# Patient Record
Sex: MALE | Race: WHITE
[De-identification: names, ages, dates, MRNs, and addresses within clinical notes are randomized per-mention and may not be internally consistent; named-entity substitution may affect disease eponyms.]

---

## 2018-06-26 ENCOUNTER — HOSPITAL ENCOUNTER (OUTPATIENT)
Dept: HOSPITAL 53 - M RAD | Age: 58
End: 2018-06-26
Attending: NURSE PRACTITIONER
Payer: COMMERCIAL

## 2018-06-26 DIAGNOSIS — N50.819: Primary | ICD-10-CM

## 2018-06-26 PROCEDURE — 76870 US EXAM SCROTUM: CPT

## 2018-12-21 ENCOUNTER — HOSPITAL ENCOUNTER (EMERGENCY)
Dept: HOSPITAL 53 - M ED | Age: 58
LOS: 1 days | Discharge: HOME | End: 2018-12-22
Payer: COMMERCIAL

## 2018-12-21 VITALS — WEIGHT: 165.35 LBS | BODY MASS INDEX: 21.22 KG/M2 | HEIGHT: 74 IN

## 2018-12-21 VITALS — DIASTOLIC BLOOD PRESSURE: 86 MMHG | SYSTOLIC BLOOD PRESSURE: 148 MMHG

## 2018-12-21 DIAGNOSIS — Z79.899: ICD-10-CM

## 2018-12-21 DIAGNOSIS — E10.40: ICD-10-CM

## 2018-12-21 DIAGNOSIS — Z79.82: ICD-10-CM

## 2018-12-21 DIAGNOSIS — K31.84: ICD-10-CM

## 2018-12-21 DIAGNOSIS — E78.5: ICD-10-CM

## 2018-12-21 DIAGNOSIS — Z88.8: ICD-10-CM

## 2018-12-21 DIAGNOSIS — E10.65: ICD-10-CM

## 2018-12-21 DIAGNOSIS — Z72.0: ICD-10-CM

## 2018-12-21 DIAGNOSIS — E86.0: ICD-10-CM

## 2018-12-21 DIAGNOSIS — I10: ICD-10-CM

## 2018-12-21 DIAGNOSIS — Z79.4: ICD-10-CM

## 2018-12-21 DIAGNOSIS — M54.9: ICD-10-CM

## 2018-12-21 DIAGNOSIS — G89.29: ICD-10-CM

## 2018-12-21 DIAGNOSIS — I95.1: Primary | ICD-10-CM

## 2018-12-21 LAB
BASE EXCESS BLDV CALC-SCNC: -2.9 MMOL/L (ref -2–2)
BASOPHILS # BLD AUTO: 0.1 10^3/UL (ref 0–0.2)
BASOPHILS NFR BLD AUTO: 0.8 % (ref 0–1)
BUN SERPL-MCNC: 12 MG/DL (ref 7–18)
CALCIUM SERPL-MCNC: 8.8 MG/DL (ref 8.5–10.1)
CHLORIDE SERPL-SCNC: 100 MEQ/L (ref 98–107)
CK MB CFR.DF SERPL CALC: 2.13
CK MB SERPL-MCNC: < 1 NG/ML (ref ?–3.6)
CK SERPL-CCNC: 47 U/L (ref 39–308)
CO2 BLDV CALC-SCNC: 24.6 MEQ/L (ref 24–28)
CO2 SERPL-SCNC: 27 MEQ/L (ref 21–32)
CREAT SERPL-MCNC: 0.86 MG/DL (ref 0.7–1.3)
EOSINOPHIL # BLD AUTO: 0.2 10^3/UL (ref 0–0.5)
EOSINOPHIL NFR BLD AUTO: 2.6 % (ref 0–3)
EST. AVERAGE GLUCOSE BLD GHB EST-MCNC: 309 MG/DL (ref 60–110)
ETHANOL SERPL-MCNC: < 0.003 % (ref 0–0.01)
GFR SERPL CREATININE-BSD FRML MDRD: > 60 ML/MIN/{1.73_M2} (ref 56–?)
GLUCOSE SERPL-MCNC: 362 MG/DL (ref 70–100)
HCO3 BLDV-SCNC: 23.2 MEQ/L (ref 23–27)
HCO3 STD BLDV-SCNC: 20.6 MEQ/L
HCT VFR BLD AUTO: 43.7 % (ref 42–52)
HGB BLD-MCNC: 14.8 G/DL (ref 13.5–17.5)
LYMPHOCYTES # BLD AUTO: 1.9 10^3/UL (ref 1.5–4.5)
LYMPHOCYTES NFR BLD AUTO: 30 % (ref 24–44)
MCH RBC QN AUTO: 30 PG (ref 27–33)
MCHC RBC AUTO-ENTMCNC: 33.9 G/DL (ref 32–36.5)
MCV RBC AUTO: 88.6 FL (ref 80–96)
MONOCYTES # BLD AUTO: 0.4 10^3/UL (ref 0–0.8)
MONOCYTES NFR BLD AUTO: 6.6 % (ref 0–5)
NEUTROPHILS # BLD AUTO: 3.7 10^3/UL (ref 1.8–7.7)
NEUTROPHILS NFR BLD AUTO: 59.7 % (ref 36–66)
PCO2 BLDV: 45.4 MMHG (ref 38–50)
PH BLDV: 7.33 UNITS (ref 7.33–7.43)
PLATELET # BLD AUTO: 288 10^3/UL (ref 150–450)
PO2 BLDV: 26.6 MMHG (ref 30–50)
POTASSIUM SERPL-SCNC: 4.5 MEQ/L (ref 3.5–5.1)
RBC # BLD AUTO: 4.93 10^6/UL (ref 4.3–6.1)
SAO2 % BLDV: 41.6 % (ref 60–80)
SODIUM SERPL-SCNC: 135 MEQ/L (ref 136–145)
TROPONIN I SERPL-MCNC: < 0.02 NG/ML (ref ?–0.1)
WBC # BLD AUTO: 6.2 10^3/UL (ref 4–10)

## 2018-12-21 PROCEDURE — 82553 CREATINE MB FRACTION: CPT

## 2018-12-21 PROCEDURE — 82550 ASSAY OF CK (CPK): CPT

## 2018-12-21 PROCEDURE — 96375 TX/PRO/DX INJ NEW DRUG ADDON: CPT

## 2018-12-21 PROCEDURE — 80320 DRUG SCREEN QUANTALCOHOLS: CPT

## 2018-12-21 PROCEDURE — 96361 HYDRATE IV INFUSION ADD-ON: CPT

## 2018-12-21 PROCEDURE — 85025 COMPLETE CBC W/AUTO DIFF WBC: CPT

## 2018-12-21 PROCEDURE — 99284 EMERGENCY DEPT VISIT MOD MDM: CPT

## 2018-12-21 PROCEDURE — 81001 URINALYSIS AUTO W/SCOPE: CPT

## 2018-12-21 PROCEDURE — 80048 BASIC METABOLIC PNL TOTAL CA: CPT

## 2018-12-21 PROCEDURE — 82803 BLOOD GASES ANY COMBINATION: CPT

## 2018-12-21 PROCEDURE — 96374 THER/PROPH/DIAG INJ IV PUSH: CPT

## 2018-12-21 PROCEDURE — 83036 HEMOGLOBIN GLYCOSYLATED A1C: CPT

## 2018-12-21 PROCEDURE — 70450 CT HEAD/BRAIN W/O DYE: CPT

## 2018-12-21 PROCEDURE — 93005 ELECTROCARDIOGRAM TRACING: CPT

## 2018-12-21 PROCEDURE — 71046 X-RAY EXAM CHEST 2 VIEWS: CPT

## 2018-12-21 NOTE — REP
Clinical:  Orbital headache and dizziness .

 

Comparison: None .

 

Findings:

The ventricles, sulci, and cisterns are normal in position and appearance.

Gray-white differentiation is maintained.  No acute intracranial hemorrhage,

mass/mass effect, pathology or trauma/injury.  No evidence for acute infarction.

No extra-axial fluid collection.  Calvarium is intact.  Mild mucoperiosteal

changes to the ethmoid sinuses.

 

Impression:

No evidence for acute intracranial pathology or trauma/injury.

 

 

Electronically Signed by

Wade Angel MD 12/21/2018 07:38 P

## 2018-12-21 NOTE — REP
Clinical:  Back pain .

 

Comparison: 08/04/2015 .

 

Technique:  PA and lateral.

 

Findings:

The mediastinum and cardiac silhouette are normal.  The lung fields are clear and

without acute consolidation, effusion, or pneumothorax.  The skeletal structures

are intact and normal.

 

Impression:

1.   No acute cardiopulmonary process.

 

 

Electronically Signed by

Wade Angel MD 12/21/2018 08:03 P

## 2018-12-22 NOTE — ECGEPIP
Stationary ECG Study

                           [facility] - ED

                                       

                                       Test Date:    2018

Pat Name:     FRANCISCO BLANCA             Department:   

Patient ID:   [id number]                 Room:         -

Gender:       M                        Technician:   [id number]

:          1960               Requested By: NAIF WAYNE PA-C

Order Number: [id number]     Reading MD:   Maja Lundborg-Gray

                                 Measurements

Intervals                              Axis          

Rate:         67                       P:            67

DC:           160                      QRS:          57

QRSD:         86                       T:            61

QT:           384                                    

QTc:          405                                    

                           Interpretive Statements

SINUS RHYTHM

NONSPECIFIC ST T WAVE CHANGES

 

CW 5/20/15 RATE DECREASED

NONSPECIFIC ST T WAVE CHANGES

Electronically Signed On 2018 20:01:24 EST by Maja Lundborg-Gray

## 2019-10-06 ENCOUNTER — HOSPITAL ENCOUNTER (INPATIENT)
Dept: HOSPITAL 53 - M ED | Age: 59
LOS: 2 days | Discharge: HOME | DRG: 420 | End: 2019-10-08
Attending: INTERNAL MEDICINE | Admitting: INTERNAL MEDICINE
Payer: COMMERCIAL

## 2019-10-06 VITALS — HEIGHT: 74 IN | WEIGHT: 165.35 LBS | BODY MASS INDEX: 21.22 KG/M2

## 2019-10-06 DIAGNOSIS — Z79.82: ICD-10-CM

## 2019-10-06 DIAGNOSIS — F17.200: ICD-10-CM

## 2019-10-06 DIAGNOSIS — Z79.899: ICD-10-CM

## 2019-10-06 DIAGNOSIS — E10.10: Primary | ICD-10-CM

## 2019-10-06 DIAGNOSIS — E87.1: ICD-10-CM

## 2019-10-06 DIAGNOSIS — E10.43: ICD-10-CM

## 2019-10-06 DIAGNOSIS — Z79.4: ICD-10-CM

## 2019-10-06 DIAGNOSIS — Z88.8: ICD-10-CM

## 2019-10-06 DIAGNOSIS — M54.5: ICD-10-CM

## 2019-10-06 DIAGNOSIS — E78.00: ICD-10-CM

## 2019-10-06 DIAGNOSIS — E83.39: ICD-10-CM

## 2019-10-06 DIAGNOSIS — R07.9: ICD-10-CM

## 2019-10-06 LAB
ALBUMIN SERPL BCG-MCNC: 4.5 GM/DL (ref 3.2–5.2)
ALT SERPL W P-5'-P-CCNC: 20 U/L (ref 12–78)
B-OH-BUTYR SERPL-MCNC: > 46 MG/DL (ref ?–2.81)
BASE EXCESS BLDV CALC-SCNC: -14.7 MMOL/L (ref -2–2)
BASE EXCESS BLDV CALC-SCNC: -16 MMOL/L (ref -2–2)
BASOPHILS # BLD AUTO: 0 10^3/UL (ref 0–0.2)
BASOPHILS NFR BLD AUTO: 0.5 % (ref 0–1)
BDY SITE: (no result)
BILIRUB SERPL-MCNC: 0.6 MG/DL (ref 0.2–1)
BUN SERPL-MCNC: 18 MG/DL (ref 7–18)
CALCIUM SERPL-MCNC: 9.4 MG/DL (ref 8.5–10.1)
CHLORIDE SERPL-SCNC: 101 MEQ/L (ref 98–107)
CK MB CFR.DF SERPL CALC: 2.22
CK MB SERPL-MCNC: < 1 NG/ML (ref ?–3.6)
CK SERPL-CCNC: 45 U/L (ref 39–308)
CO2 BLDV CALC-SCNC: 14.4 MEQ/L (ref 24–28)
CO2 BLDV CALC-SCNC: 15.2 MEQ/L (ref 24–28)
CO2 SERPL-SCNC: 16 MEQ/L (ref 21–32)
CREAT SERPL-MCNC: 1.21 MG/DL (ref 0.7–1.3)
EOSINOPHIL # BLD AUTO: 0.1 10^3/UL (ref 0–0.5)
EOSINOPHIL NFR BLD AUTO: 0.8 % (ref 0–3)
EST. AVERAGE GLUCOSE BLD GHB EST-MCNC: 312 MG/DL (ref 60–110)
GFR SERPL CREATININE-BSD FRML MDRD: > 60 ML/MIN/{1.73_M2} (ref 56–?)
GLUCOSE SERPL-MCNC: 318 MG/DL (ref 70–100)
HCO3 BLDV-SCNC: 13.1 MEQ/L (ref 23–27)
HCO3 BLDV-SCNC: 13.9 MEQ/L (ref 23–27)
HCO3 STD BLDV-SCNC: 12.1 MEQ/L
HCO3 STD BLDV-SCNC: 12.8 MEQ/L
HCT VFR BLD AUTO: 48.4 % (ref 42–52)
HGB BLD-MCNC: 16 G/DL (ref 13.5–17.5)
LIPASE SERPL-CCNC: 52 U/L (ref 73–393)
LYMPHOCYTES # BLD AUTO: 2.1 10^3/UL (ref 1.5–5)
LYMPHOCYTES NFR BLD AUTO: 26.6 % (ref 24–44)
MCH RBC QN AUTO: 30.2 PG (ref 27–33)
MCHC RBC AUTO-ENTMCNC: 33.1 G/DL (ref 32–36.5)
MCV RBC AUTO: 91.5 FL (ref 80–96)
MONOCYTES # BLD AUTO: 0.6 10^3/UL (ref 0–0.8)
MONOCYTES NFR BLD AUTO: 7.3 % (ref 0–5)
NEUTROPHILS # BLD AUTO: 5 10^3/UL (ref 1.5–8.5)
NEUTROPHILS NFR BLD AUTO: 64.4 % (ref 36–66)
PCO2 BLDV: 41.9 MMHG (ref 38–50)
PCO2 BLDV: 42 MMHG (ref 38–50)
PH BLDV: 7.11 UNITS (ref 7.33–7.43)
PH BLDV: 7.14 UNITS (ref 7.33–7.43)
PLATELET # BLD AUTO: 393 10^3/UL (ref 150–450)
PO2 BLDV: 30.8 MMHG (ref 30–50)
PO2 BLDV: 37 MMHG (ref 30–50)
POTASSIUM SERPL-SCNC: 4.9 MEQ/L (ref 3.5–5.1)
PROT SERPL-MCNC: 7.5 GM/DL (ref 6.4–8.2)
RBC # BLD AUTO: 5.29 10^6/UL (ref 4.3–6.1)
SAO2 % BLDV: 55.2 % (ref 60–80)
SAO2 % BLDV: 61.7 % (ref 60–80)
SODIUM SERPL-SCNC: 132 MEQ/L (ref 136–145)
TROPONIN I SERPL-MCNC: < 0.02 NG/ML (ref ?–0.1)
WBC # BLD AUTO: 7.8 10^3/UL (ref 4–10)

## 2019-10-06 NOTE — ECGEPIP
[facility] - ED

                                       

                                       Test Date:    2019-10-06

Pat Name:     FRANCISCO BLANCA             Department:   

Patient ID:   [id number]                 Room:         -

Gender:       Male                     Technician:   [id number]

:          1960               Requested By: EMILIA RICH

Order Number: [id number]     Reading MD:   Margarita Goddard

                                 Measurements

Intervals                              Axis          

Rate:         79                       P:            70

UT:           157                      QRS:          66

QRSD:         79                       T:            82

QT:           359                                    

QTc:          412                                    

                           Interpretive Statements

SINUS RHYTHM

NONSPECIFIC T-WAVE ABNORMALITY

INCREASED RATE 18

Electronically Signed on 10-6-2019 20:36:23 EDT by Margarita Goddard

## 2019-10-06 NOTE — HPEPDOC
[facility] Medical History & Physical


Date of Admission


Oct 6, 2019


Date of Service:  Oct 6, 2019


Primary Care Physician:  Nick Garcia


Attending Physician:  ZURDO MASSEY MD





History and Physical


TIME OF SERVICE: 1035PM CC time 60 min


   


CHIEF COMPLAINT:  not feeling well





HISTORY OF PRESENT ILLNESS: 


This is a 59-year-old male who presents with complaints of not feeling well, th

at began earlier on today. Specifically, he has a runny nose, chest pain, back 

pain, headache, abdominal pain with nonbloody emesis, chills increased thirst, 

and increased urination. He denies having a fever, denies having diarrhea, 

denies having constipation, denies missing any of his doses of insulin or having

any sick contacts. He has been admitted for DKA about 4 or 5 times in the past.


Per discussion with the ED attending his labs appear to be consistent with DKA. 

History of insulin drip and IV fluids.





REVIEW OF SYSTEMS: 12 point review of systems negative except as listed in HPI





PAST MEDICAL/ SURGICAL HISTORY:


Poorly controlled IDDM1 complicated by neuropathy and gastroparesis


Dyslipidemia.


Insomnia


Chronic back pain status post laminectomy.


Status post bilateral hernia repair





SOCIAL HISTORY:


Smoker





FAMILY HISTORY:


Leukemia.


Bladder cancer


   


ALLERGIES: Please see below.





HOME MEDICATIONS: Please see below. 





PHYSICAL EXAMINATION:


VITAL SIGNS:  Please see below.


GENERAL APPEARANCE: Slim build, well-developed, not in apparent distress


HEENT: Normocephalic, atraumatic, mucous membranes dry


CARDIOVASCULAR: Regular rate and rhythm. No murmurs, rubs or gallops


LUNGS: Clear to auscultation bilaterally on room air


ABDOMEN: Bowel sounds hypoactive. The abdomen is soft and nontender on palpation


MUSCULOSKELETAL: Range of motion is intact in all 4 extremities is no lower 

extremity edema


NEUROLOGICAL: Cranial nerves II-12 grossly intact. Speech is not dysarthric


PSYCHIATRIC: Alert and oriented to person, place and time, able to understand 

and follow commands





LABORATORY DATA: See below.





IMAGING: 


Chest x-ray unremarkable but the final read is pending.





ASSESSMENT: 


Mr. Beckett is a 59-year-old male with a past medical history of poorly 

controlled insulin-dependent diabetes, chronic hypertension, chronic back pain, 

and insomnia who is admitted for management of of DKA.





PLAN:


1. DKA


May be due to an upper respiratory tract infection vs noncompliance


PH is 7.139, glucose is 318, carbon dioxide of 16, anion gap is 15, urine is 

positive for ketones, beta hydroxybutyrate > 46


His WBC count, chest x-ray, and UA are revealing.


Plan: Admit to ICU for management of DKA/follow-up influenza panel, serum ETHO, 

Drug screen & blood cx/ NPO pending resolution of DKA / Insulin drip per 

protocol/ IVF / f/u accuchecks Q1H, BMP Q4H, venous PH Q4H, osmol Q4H, Mag Q4H, 

phosp Q4H / f/u A1C / hold home anti-glycemic agents  / DM education





2. Chest Pain / Back pain


Plan: telemetry f/u Trop and EKG /ASA





3.Pseudohyponatremia related to hypoglycemia.


Plan: Follow-up repeat BMP after IV fluids





4. Tobacco abuse.


Plan: Refused nicotine patch/smoking cessation education





5. Chronic back pain


Plan: Resume home meds after after DKA has resolved





DVT prophylaxis with Lovenox.





Disposition pending clinical course








LATE ENTRY


#Hypophosphatemia


repeat BMP reviewed


Plan: replete lytes & f/u BMP at 3AM





Vital Signs





Vital Signs








  Date Time  Temp Pulse Resp B/P (MAP) Pulse Ox O2 Delivery O2 Flow Rate FiO2


 


10/6/19 22:00  78  112/58 (76) 100 Room Air  


 


10/6/19 19:34 98.0  18     











Laboratory Data


Labs 24H


Laboratory Tests 2


10/6/19 19:57: 


Immature Granulocyte % (Auto) 0.4, White Blood Count 7.8, Red Blood Count 5.29, 

Hemoglobin 16.0, Hematocrit 48.4, Mean Corpuscular Volume 91.5, Mean Corpuscular

Hemoglobin 30.2, Mean Corpuscular Hemoglobin Concent 33.1, Red Cell Distribution

Width 12.4, Platelet Count 393, Neutrophils (%) (Auto) 64.4, Lymphocytes (%) 

(Auto) 26.6, Monocytes (%) (Auto) 7.3H, Eosinophils (%) (Auto) 0.8, Basophils 

(%) (Auto) 0.5, Neutrophils # (Auto) 5.0, Lymphocytes # (Auto) 2.1, Monocytes # 

(Auto) 0.6, Eosinophils # (Auto) 0.1, Basophils # (Auto) 0.0, Nucleated Red 

Blood Cells % (auto) 0.0, Urine Color STRAW, Urine Appearance CLEAR, Urine pH 

5.0, Urine Specific Gravity 1.024, Urine Protein 1+H, Urine Glucose (UA) 3+H, 

Urine Ketones 2+H, Urine Blood NEGATIVE, Urine Nitrite NEGATIVE, Urine Bilirubin

NEGATIVE, Urine Urobilinogen 0.2, Urine Leukocyte Esterase NEGATIVE, Urine WBC 

(Auto) 1, Urine RBC (Auto) 2, Urine Hyaline Casts (Auto) 7, Urine Bacteria 

(Auto) NEGATIVE, Urine Squamous Epithelial Cells 0, Urine Sperm (Auto) , Blood 

Gas Bicarbonate Standard 12.8, Venous Blood pH 7.139L, Venous Blood Partial 

Pressure CO2 41.9, Venous Blood Partial Pressure O2 30.8, Venous Blood Total 

Carbon Dioxide 15.2L, Venous Blood HCO3 13.9L, Venous Blood Oxygen Saturation 

55.2L, Venous Blood Base Excess -14.7L, Anion Gap 15, Glomerular Filtration Rate

> 60.0, Estimated Mean Plasma Glucose 312H, Hemoglobin A1c 12.5, Blood Urea 

Nitrogen 18, Creatinine 1.21, Sodium Level 132L, Potassium Level 4.9, Chloride 

Level 101, Carbon Dioxide Level 16L, Calcium Level 9.4, Aspartate Amino Transf 

(AST/SGOT) 7, Alanine Aminotransferase (ALT/SGPT) 20, Total Creatine Kinase 45, 

Alkaline Phosphatase 95, Total Bilirubin 0.6, Total Protein 7.5, Albumin 4.5, 

Creatine Kinase MB < 1.0, Creatine Kinase MB Relative Index 2.22, Troponin I < 

0.02, Albumin/Globulin Ratio 1.50, Lipase 52L, B-Hydroxybutyrate > 46.00H


10/6/19 20:00: Bedside Glucose (Misc Panel) 316H


10/6/19 21:29: Bedside Glucose (Misc Panel) 287H


10/6/19 22:05: Bedside Glucose (Misc Panel) 273H


CBC/BMP


Laboratory Tests


10/6/19 19:57








Red Blood Count 5.29, Mean Corpuscular Volume 91.5, Mean Corpuscular Hemoglobin 

30.2, Mean Corpuscular Hemoglobin Concent 33.1, Red Cell Distribution Width 

12.4, Neutrophils (%) (Auto) 64.4, Lymphocytes (%) (Auto) 26.6, Monocytes (%) 

(Auto) 7.3 H, Eosinophils (%) (Auto) 0.8, Basophils (%) (Auto) 0.5, Neutrophils 

# (Auto) 5.0, Lymphocytes # (Auto) 2.1, Monocytes # (Auto) 0.6, Eosinophils # 

(Auto) 0.1, Basophils # (Auto) 0.0, Calcium Level 9.4, Aspartate Amino Transf 

(AST/SGOT) 7, Alanine Aminotransferase (ALT/SGPT) 20, Total Creatine Kinase 45, 

Alkaline Phosphatase 95, Total Bilirubin 0.6, Total Protein 7.5, Albumin 4.5





Home Medications


Scheduled


Aspirin (Aspirin EC) 81 Mg Tab, 81 MG PO DAILY


Atorvastatin Calcium (Atorvastatin Calcium) 10 Mg Tab, 10 MG PO DAILY


Duloxetine Hcl (Cymbalta) 60 Mg Cap, 60 MG PO DAILY


Duloxetine Hcl (Duloxetine HCl) 30 Mg Cap, 30 MG PO QPM


Insulin Glargine,Hum.rec.anlog (Basaglar Kwikpen U-100) 100 Unit/1 Ml Insuln.pe

n, 65 UNIT SC DAILY


Insulin Human Lispro (Novolog) 100 U/Ml Inj, 1 DOSE SC AC


   PER SLIDING SCALE 


Lisinopril (Lisinopril) 5 Mg Tab, 5 MG PO DAILY


Metoclopramide Hcl (Reglan) 5 Mg Tab, 5 MG PO QID


Zolpidem Tartrate (Ambien) 10 Mg Tab, 10 MG PO QHS





Scheduled PRN


Acetaminophen with Codeine (Acetaminophen-Cod #3 Tablet) 1 Tab Tab, 2 TAB PO Q4H

PRN for PAIN


Meclizine HCl (Meclizine HCl) 25 Mg Tablet, 25 MG PO TID PRN for NAUSEA OR 

VOMITING





Allergies


Coded Allergies:  


     pregabalin (Verified  Allergy, Unknown, 10/6/19)





A-FIB/CHADSVASC


A-FIB History


Current/History of A-Fib/PAF?:  No


Current PO Anticoag Therapy:  No











ZURDO MASSEY MD                 Oct 6, 2019 23:05

## 2019-10-07 VITALS — DIASTOLIC BLOOD PRESSURE: 55 MMHG | SYSTOLIC BLOOD PRESSURE: 103 MMHG

## 2019-10-07 VITALS — DIASTOLIC BLOOD PRESSURE: 76 MMHG | SYSTOLIC BLOOD PRESSURE: 107 MMHG

## 2019-10-07 VITALS — DIASTOLIC BLOOD PRESSURE: 68 MMHG | SYSTOLIC BLOOD PRESSURE: 110 MMHG

## 2019-10-07 VITALS — DIASTOLIC BLOOD PRESSURE: 76 MMHG | SYSTOLIC BLOOD PRESSURE: 123 MMHG

## 2019-10-07 VITALS — SYSTOLIC BLOOD PRESSURE: 84 MMHG | DIASTOLIC BLOOD PRESSURE: 54 MMHG

## 2019-10-07 VITALS — SYSTOLIC BLOOD PRESSURE: 107 MMHG | DIASTOLIC BLOOD PRESSURE: 66 MMHG

## 2019-10-07 VITALS — DIASTOLIC BLOOD PRESSURE: 75 MMHG | SYSTOLIC BLOOD PRESSURE: 116 MMHG

## 2019-10-07 VITALS — SYSTOLIC BLOOD PRESSURE: 101 MMHG | DIASTOLIC BLOOD PRESSURE: 57 MMHG

## 2019-10-07 VITALS — DIASTOLIC BLOOD PRESSURE: 67 MMHG | SYSTOLIC BLOOD PRESSURE: 107 MMHG

## 2019-10-07 VITALS — SYSTOLIC BLOOD PRESSURE: 90 MMHG | DIASTOLIC BLOOD PRESSURE: 58 MMHG

## 2019-10-07 VITALS — DIASTOLIC BLOOD PRESSURE: 58 MMHG | SYSTOLIC BLOOD PRESSURE: 90 MMHG

## 2019-10-07 LAB
AMPHETAMINES UR QL SCN: NEGATIVE
B-OH-BUTYR SERPL-MCNC: 31.6 MG/DL (ref ?–2.81)
BARBITURATES UR QL SCN: NEGATIVE
BASE EXCESS BLDV CALC-SCNC: -7.1 MMOL/L (ref -2–2)
BASE EXCESS BLDV CALC-SCNC: -8.8 MMOL/L (ref -2–2)
BASE EXCESS BLDV CALC-SCNC: -9.1 MMOL/L (ref -2–2)
BDY SITE: (no result)
BDY SITE: (no result)
BENZODIAZ UR QL SCN: NEGATIVE
BUN SERPL-MCNC: 13 MG/DL (ref 7–18)
BUN SERPL-MCNC: 13 MG/DL (ref 7–18)
BUN SERPL-MCNC: 17 MG/DL (ref 7–18)
BUN SERPL-MCNC: 18 MG/DL (ref 7–18)
BZE UR QL SCN: NEGATIVE
CALCIUM SERPL-MCNC: 8 MG/DL (ref 8.5–10.1)
CALCIUM SERPL-MCNC: 8.4 MG/DL (ref 8.5–10.1)
CALCIUM SERPL-MCNC: 8.4 MG/DL (ref 8.5–10.1)
CALCIUM SERPL-MCNC: 8.6 MG/DL (ref 8.5–10.1)
CANNABINOIDS UR QL SCN: NEGATIVE
CHLORIDE SERPL-SCNC: 107 MEQ/L (ref 98–107)
CHLORIDE SERPL-SCNC: 108 MEQ/L (ref 98–107)
CHLORIDE SERPL-SCNC: 108 MEQ/L (ref 98–107)
CHLORIDE SERPL-SCNC: 111 MEQ/L (ref 98–107)
CO2 BLDV CALC-SCNC: 17.3 MEQ/L (ref 24–28)
CO2 BLDV CALC-SCNC: 18.8 MEQ/L (ref 24–28)
CO2 BLDV CALC-SCNC: 21.2 MEQ/L (ref 24–28)
CO2 SERPL-SCNC: 18 MEQ/L (ref 21–32)
CO2 SERPL-SCNC: 19 MEQ/L (ref 21–32)
CO2 SERPL-SCNC: 22 MEQ/L (ref 21–32)
CO2 SERPL-SCNC: 22 MEQ/L (ref 21–32)
CREAT SERPL-MCNC: 0.82 MG/DL (ref 0.7–1.3)
CREAT SERPL-MCNC: 0.86 MG/DL (ref 0.7–1.3)
CREAT SERPL-MCNC: 0.9 MG/DL (ref 0.7–1.3)
CREAT SERPL-MCNC: 1.07 MG/DL (ref 0.7–1.3)
ETHANOL SERPL-MCNC: < 0.003 % (ref 0–0.01)
GFR SERPL CREATININE-BSD FRML MDRD: > 60 ML/MIN/{1.73_M2} (ref 56–?)
GLUCOSE SERPL-MCNC: 174 MG/DL (ref 70–100)
GLUCOSE SERPL-MCNC: 192 MG/DL (ref 70–100)
GLUCOSE SERPL-MCNC: 233 MG/DL (ref 70–100)
GLUCOSE SERPL-MCNC: 250 MG/DL (ref 70–100)
HCO3 BLDV-SCNC: 16.3 MEQ/L (ref 23–27)
HCO3 BLDV-SCNC: 17.6 MEQ/L (ref 23–27)
HCO3 BLDV-SCNC: 19.8 MEQ/L (ref 23–27)
HCO3 STD BLDV-SCNC: 17 MEQ/L
HCO3 STD BLDV-SCNC: 17.2 MEQ/L
HCO3 STD BLDV-SCNC: 18.4 MEQ/L
HCT VFR BLD AUTO: 38.5 % (ref 42–52)
HGB BLD-MCNC: 13 G/DL (ref 13.5–17.5)
MAGNESIUM SERPL-MCNC: 1.8 MG/DL (ref 1.8–2.4)
MAGNESIUM SERPL-MCNC: 1.9 MG/DL (ref 1.8–2.4)
MAGNESIUM SERPL-MCNC: 1.9 MG/DL (ref 1.8–2.4)
MAGNESIUM SERPL-MCNC: 2 MG/DL (ref 1.8–2.4)
MCH RBC QN AUTO: 30.2 PG (ref 27–33)
MCHC RBC AUTO-ENTMCNC: 33.8 G/DL (ref 32–36.5)
MCV RBC AUTO: 89.5 FL (ref 80–96)
METHADONE UR QL SCN: NEGATIVE
OPIATES UR QL SCN: NEGATIVE
OSMOLALITY SERPL: 284 MOSM/KG (ref 275–295)
OSMOLALITY SERPL: 288 MOSM/KG (ref 275–295)
OSMOLALITY SERPL: 294 MOSM/KG (ref 275–295)
OSMOLALITY SERPL: 296 MOSM/KG (ref 275–295)
PCO2 BLDV: 34 MMHG (ref 38–50)
PCO2 BLDV: 39.6 MMHG (ref 38–50)
PCO2 BLDV: 45.3 MMHG (ref 38–50)
PCP UR QL SCN: NEGATIVE
PH BLDV: 7.26 UNITS (ref 7.33–7.43)
PH BLDV: 7.26 UNITS (ref 7.33–7.43)
PH BLDV: 7.3 UNITS (ref 7.33–7.43)
PHOSPHATE SERPL-MCNC: 1.9 MG/DL (ref 2.5–4.9)
PHOSPHATE SERPL-MCNC: 2 MG/DL (ref 2.5–4.9)
PHOSPHATE SERPL-MCNC: 2.4 MG/DL (ref 2.5–4.9)
PHOSPHATE SERPL-MCNC: 3.4 MG/DL (ref 2.5–4.9)
PLATELET # BLD AUTO: 269 10^3/UL (ref 150–450)
PO2 BLDV: 46.9 MMHG (ref 30–50)
PO2 BLDV: 47 MMHG (ref 30–50)
PO2 BLDV: 48.1 MMHG (ref 30–50)
POTASSIUM SERPL-SCNC: 3.7 MEQ/L (ref 3.5–5.1)
POTASSIUM SERPL-SCNC: 3.8 MEQ/L (ref 3.5–5.1)
POTASSIUM SERPL-SCNC: 4.2 MEQ/L (ref 3.5–5.1)
POTASSIUM SERPL-SCNC: 4.3 MEQ/L (ref 3.5–5.1)
RBC # BLD AUTO: 4.3 10^6/UL (ref 4.3–6.1)
SAO2 % BLDV: 82.1 % (ref 60–80)
SAO2 % BLDV: 83.2 % (ref 60–80)
SAO2 % BLDV: 83.4 % (ref 60–80)
SODIUM SERPL-SCNC: 135 MEQ/L (ref 136–145)
SODIUM SERPL-SCNC: 136 MEQ/L (ref 136–145)
SODIUM SERPL-SCNC: 137 MEQ/L (ref 136–145)
SODIUM SERPL-SCNC: 139 MEQ/L (ref 136–145)
TROPONIN I SERPL-MCNC: < 0.02 NG/ML (ref ?–0.1)
WBC # BLD AUTO: 5.2 10^3/UL (ref 4–10)

## 2019-10-07 RX ADMIN — DULOXETINE HYDROCHLORIDE SCH MG: 30 CAPSULE, DELAYED RELEASE ORAL at 09:44

## 2019-10-07 RX ADMIN — INSULIN LISPRO SCH UNITS: 100 INJECTION, SOLUTION INTRAVENOUS; SUBCUTANEOUS at 12:20

## 2019-10-07 RX ADMIN — METOCLOPRAMIDE SCH MG: 5 TABLET ORAL at 20:26

## 2019-10-07 RX ADMIN — Medication SCH: at 01:25

## 2019-10-07 RX ADMIN — ENOXAPARIN SODIUM SCH MG: 40 INJECTION SUBCUTANEOUS at 06:23

## 2019-10-07 RX ADMIN — Medication SCH: at 06:00

## 2019-10-07 RX ADMIN — INSULIN LISPRO SCH UNITS: 100 INJECTION, SOLUTION INTRAVENOUS; SUBCUTANEOUS at 17:33

## 2019-10-07 RX ADMIN — METOCLOPRAMIDE SCH MG: 5 TABLET ORAL at 13:19

## 2019-10-07 RX ADMIN — ACETAMINOPHEN AND CODEINE PHOSPHATE PRN EA: 300; 30 TABLET ORAL at 05:05

## 2019-10-07 RX ADMIN — Medication SCH: at 01:16

## 2019-10-07 RX ADMIN — SODIUM CHLORIDE, PRESERVATIVE FREE SCH ML: 5 INJECTION INTRAVENOUS at 22:11

## 2019-10-07 RX ADMIN — ACETAMINOPHEN AND CODEINE PHOSPHATE PRN EA: 300; 30 TABLET ORAL at 23:59

## 2019-10-07 RX ADMIN — SODIUM CHLORIDE, PRESERVATIVE FREE SCH ML: 5 INJECTION INTRAVENOUS at 14:35

## 2019-10-07 RX ADMIN — METOCLOPRAMIDE SCH MG: 5 TABLET ORAL at 09:47

## 2019-10-07 RX ADMIN — METOCLOPRAMIDE SCH MG: 5 TABLET ORAL at 17:33

## 2019-10-07 RX ADMIN — LISINOPRIL SCH MG: 5 TABLET ORAL at 09:00

## 2019-10-07 RX ADMIN — ASPIRIN SCH MG: 81 TABLET ORAL at 09:45

## 2019-10-07 RX ADMIN — ATORVASTATIN CALCIUM SCH MG: 10 TABLET, FILM COATED ORAL at 09:45

## 2019-10-07 RX ADMIN — ACETAMINOPHEN AND CODEINE PHOSPHATE PRN EA: 300; 30 TABLET ORAL at 12:20

## 2019-10-07 NOTE — IPNPDOC
Subjective


Date Seen


The patient was seen on 10/7/19.





Subjective


Chief Complaint/HPI


Patient feeling better, lying in bed, offers no new complaints at the present 

time


General:  Denies: ROS Unobtainable, Chills, Night Sweats, Fatigue, Malaise, 

Normal Appetite, Other Symptoms


Constitutional:  Denies: Chills, Fever, Malaise, Night Sweats, Weakness, 

Fatigue, Weight Loss, Lethargy, Other


Pulmonary:  Denies: Dyspnea, Cough, Pleuritic Chest Pain, Other Symptoms


Cardiovascular:  Denies: Chest Pain, Palpitations, Orthopnea, Paroxysmal Noc. 

Dyspnea, Edema, Lt Headedness, Other Symptoms


Gastrointestinal:  Denies: Nausea, Vomiting, Abdominal Pain, Diarrhea, 

Constipation, Melena, Hematochezia, Other Symptoms


Hematologic:  Denies: Bruising, Bleeding Excessively, Petecchia, Purpura, 

Enlarged Lymph Nodes, Other Hematologic


Endocrine:  Denies: Polydipsia, Polyphagia, Polyuria, Heat Intolerance, Cold 

Intolerance, Other Endocrine Sx


Musculoskeletal:  Denies: Neck Pain, Back Pain, Shoulder Pain, Arm Pain, Hand 

Pain, Leg Pain, Foot Pain, Joint Pain, Muscle Pain, Spasms, Other Symptoms


Neurological:  Denies: Weakness, Numbness, Incoordination, Change in speech, 

Confusion, Seizures, Other Symptoms





Objective


Physical Examination


General Exam:  Positive: Alert, Cooperative


Eye Exam:  Positive: PERRLA, Conjunctiva & lids normal


ENT Exam:  Positive: Atraumatic


Neck Exam:  Positive: Supple


Chest Exam:  Positive: Clear to auscultation, Normal air movement


Heart Exam:  Positive: Rate Normal, Normal S1, Normal S2


Abdomen Exam:  Positive: Normal bowel sounds, Soft, Tenderness


Extremity Exam:  Positive: Normal pulses


Skin Exam:  Positive: Nl turgor and temperature


Neuro Exam:  Positive: Strength at 5/5 X4 ext, Sensation Intact





Assessment /Plan


Problems





(1) DKA (diabetic ketoacidoses)


Status:  Acute


Problem Text:  


Patient's anion gap is closing and B hydroxy butyrate level is decreasing 

progressively


Continue IV fluids, D5 half-normal saline at the present rate


Continue insulin and 1 unit per hour until the blood level is under 200 for 2-3 

hours


Will restart home dose of levemir to 65 units daily at bedtime


Started on diabetic diet


Will change insulin to fingerstick coverage every before meals and at bedtime t

his afternoon


Continue home meds








(2) Gastroparesis


Onset Date:  6/16/2014      Status:  Chronic


Problem Text:  Continue home meds





(3) Diabetic neuropathy


Onset Date:  6/16/2014      Status:  Chronic


Problem Text:  Continue home meds





(4) Hypercholesteremia


Onset Date:  6/16/2014      Status:  Chronic


Problem Text:  Continue home meds








Plan/VTE


VTE Prophylaxis Ordered?:  Yes





VS, I&O, 24H, Fishbone


Vital Signs/I&O





Vital Signs








  Date Time  Temp Pulse Resp B/P (MAP) Pulse Ox O2 Delivery O2 Flow Rate FiO2


 


10/7/19 05:35   16     


 


10/7/19 04:00 97.0 66  107/67 (80) 98   


 


10/6/19 23:30      Room Air  














I&O- Last 24 Hours up to 6 AM 


 


 10/7/19





 06:00


 


Intake Total 2610.0 ml


 


Output Total 0 ml


 


Balance 2610.0 ml











Laboratory Data


24H LABS


Laboratory Tests 2


10/6/19 19:57: 


Immature Granulocyte % (Auto) 0.4, White Blood Count 7.8, Red Blood Count 5.29, 

Hemoglobin 16.0, Hematocrit 48.4, Mean Corpuscular Volume 91.5, Mean Corpuscular

Hemoglobin 30.2, Mean Corpuscular Hemoglobin Concent 33.1, Red Cell Distribution

Width 12.4, Platelet Count 393, Neutrophils (%) (Auto) 64.4, Lymphocytes (%) 

(Auto) 26.6, Monocytes (%) (Auto) 7.3H, Eosinophils (%) (Auto) 0.8, Basophils 

(%) (Auto) 0.5, Neutrophils # (Auto) 5.0, Lymphocytes # (Auto) 2.1, Monocytes # 

(Auto) 0.6, Eosinophils # (Auto) 0.1, Basophils # (Auto) 0.0, Nucleated Red 

Blood Cells % (auto) 0.0, Urine Color STRAW, Urine Appearance CLEAR, Urine pH 

5.0, Urine Specific Gravity 1.024, Urine Protein 1+H, Urine Glucose (UA) 3+H, 

Urine Ketones 2+H, Urine Blood NEGATIVE, Urine Nitrite NEGATIVE, Urine Bilirubin

NEGATIVE, Urine Urobilinogen 0.2, Urine Leukocyte Esterase NEGATIVE, Urine WBC 

(Auto) 1, Urine RBC (Auto) 2, Urine Hyaline Casts (Auto) 7, Urine Bacteria 

(Auto) NEGATIVE, Urine Squamous Epithelial Cells 0, Urine Sperm (Auto) , Blood 

Gas Bicarbonate Standard 12.8, Venous Blood pH 7.139L, Venous Blood Partial 

Pressure CO2 41.9, Venous Blood Partial Pressure O2 30.8, Venous Blood Total 

Carbon Dioxide 15.2L, Venous Blood HCO3 13.9L, Venous Blood Oxygen Saturation 

55.2L, Venous Blood Base Excess -14.7L, Anion Gap 15, Glomerular Filtration Rate

> 60.0, Estimated Mean Plasma Glucose 312H, Hemoglobin A1c 12.5, Blood Urea 

Nitrogen 18, Creatinine 1.21, Sodium Level 132L, Potassium Level 4.9, Chloride 

Level 101, Carbon Dioxide Level 16L, Calcium Level 9.4, Aspartate Amino Transf 

(AST/SGOT) 7, Alanine Aminotransferase (ALT/SGPT) 20, Total Creatine Kinase 45, 

Alkaline Phosphatase 95, Total Bilirubin 0.6, Total Protein 7.5, Albumin 4.5, 

Creatine Kinase MB < 1.0, Creatine Kinase MB Relative Index 2.22, Troponin I < 

0.02, Albumin/Globulin Ratio 1.50, Lipase 52L, Urine Amphetamines Screen 

NEGATIVE, Urine Benzodiazepines Screen NEGATIVE, Urine Opiates Screen NEGATIVE, 

Urine Methadone Screen NEGATIVE, Urine Barbiturates Screen NEGATIVE, Urine 

Phencyclidine Screen NEGATIVE, Urine Cocaine Metabolite Screen NEGATIVE, Urine 

Cannabinoids Screen NEGATIVE, Ethyl Alcohol Level < 0.003, B-Hydroxybutyrate > 

46.00H


10/6/19 20:00: Bedside Glucose (Misc Panel) 316H


10/6/19 21:29: Bedside Glucose (Misc Panel) 287H


10/6/19 22:05: Bedside Glucose (Misc Panel) 273H


10/6/19 23:07: Bedside Glucose (Misc Panel) 250H


10/6/19 23:09: 


Blood Gas Puncture Site UNKNOWN, Blood Gas Bicarbonate Standard 12.1, Venous 

Blood pH 7.111L, Venous Blood Partial Pressure CO2 42.0, Venous Blood Partial 

Pressure O2 37.0, Venous Blood Total Carbon Dioxide 14.4L, Venous Blood HCO3 

13.1L, Venous Blood Oxygen Saturation 61.7, Venous Blood Base Excess -16.0L


10/6/19 23:40: 


Anion Gap 9, Glomerular Filtration Rate > 60.0, Osmolality 296H, Blood Urea 

Nitrogen 17, Creatinine 1.07, Sodium Level 136, Potassium Level 4.2, Chloride 

Level 108H, Carbon Dioxide Level 19L, Calcium Level 8.6, Phosphorus Level 1.9L, 

Magnesium Level 2.0, Troponin I < 0.02, B-Hydroxybutyrate 31.60H


10/7/19 00:16: Bedside Glucose (Misc Panel) 223H


10/7/19 01:09: Bedside Glucose (Misc Panel) 159H


10/7/19 02:10: Bedside Glucose (Misc Panel) 163H


10/7/19 02:45: 


Anion Gap 10, Glomerular Filtration Rate > 60.0, Osmolality 288, Blood Urea 

Nitrogen 18, Creatinine 0.86, Sodium Level 139, Potassium Level 3.7, Chloride 

Level 111H, Carbon Dioxide Level 18L, Calcium Level 8.0L, Phosphorus Level 2.0L,

Magnesium Level 1.9, Troponin I < 0.02


10/7/19 02:46: 


Blood Gas Puncture Site UNKNOWN, Blood Gas Bicarbonate Standard 17.0, Venous 

Blood pH 7.298L, Venous Blood Partial Pressure CO2 34.0L, Venous Blood Partial 

Pressure O2 46.9, Venous Blood Total Carbon Dioxide 17.3L, Venous Blood HCO3 

16.3L, Venous Blood Oxygen Saturation 83.4H, Venous Blood Base Excess -9.1L


10/7/19 02:53: Bedside Glucose (Misc Panel) 176H


10/7/19 04:08: Bedside Glucose (Misc Panel) 209H


10/7/19 04:54: Bedside Glucose (Misc Panel) 230H


10/7/19 06:16: Bedside Glucose (Misc Panel) 248H


10/7/19 06:54: 


Nucleated Red Blood Cells % (auto) 0.0, Blood Gas Puncture Site UNKNOWN, Blood 

Gas Bicarbonate Standard 18.4, Venous Blood pH 7.259L, Venous Blood Partial 

Pressure CO2 45.3, Venous Blood Partial Pressure O2 47.0, Venous Blood Total 

Carbon Dioxide 21.2L, Venous Blood HCO3 19.8L, Venous Blood Oxygen Saturation 

82.1H, Venous Blood Base Excess -7.1L, Anion Gap 7L, Glomerular Filtration Rate 

> 60.0, Osmolality 294, Blood Urea Nitrogen 13, Creatinine 0.90, Sodium Level 

137, Potassium Level 4.3, Chloride Level 108H, Carbon Dioxide Level 22, Calcium 

Level 8.4L, Phosphorus Level 3.4#, Magnesium Level 1.8, Troponin I < 0.02


10/7/19 07:32: Bedside Glucose (Misc Panel) 224H


10/7/19 08:08: Bedside Glucose (Misc Panel) 239H


CBC/BMP


Laboratory Tests


10/6/19 19:57








Red Blood Count 5.29, Mean Corpuscular Volume 91.5, Mean Corpuscular Hemoglobin 

30.2, Mean Corpuscular Hemoglobin Concent 33.1, Red Cell Distribution Width 

12.4, Neutrophils (%) (Auto) 64.4, Lymphocytes (%) (Auto) 26.6, Monocytes (%) 

(Auto) 7.3 H, Eosinophils (%) (Auto) 0.8, Basophils (%) (Auto) 0.5, Neutrophils 

# (Auto) 5.0, Lymphocytes # (Auto) 2.1, Monocytes # (Auto) 0.6, Eosinophils # 

(Auto) 0.1, Basophils # (Auto) 0.0, Calcium Level 9.4, Aspartate Amino Transf 

(AST/SGOT) 7, Alanine Aminotransferase (ALT/SGPT) 20, Total Creatine Kinase 45, 

Alkaline Phosphatase 95, Total Bilirubin 0.6, Total Protein 7.5, Albumin 4.5





10/6/19 23:40








Calcium Level 8.6





10/7/19 02:45








Calcium Level 8.0 L





10/7/19 06:54








Red Blood Count 4.30, Mean Corpuscular Volume 89.5, Mean Corpuscular Hemoglobin 

30.2, Mean Corpuscular Hemoglobin Concent 33.8, Red Cell Distribution Width 

12.4, Calcium Level 8.4 L











KARYNA IRVIN MD               Oct 7, 2019 09:38

## 2019-10-07 NOTE — REP
Portable chest, nine L E p.m., single AP view with the patient sitting:

Comparison is the PA and lateral chest dated 12/21/2018.

The lung fields are clear.

The cardiac size is normal.

The aubrey, mediastinum, and skeletal structures are unremarkable.

Impression:

Negative portable chest.

There is no interval change.

 

 

Electronically Signed by

Renard Abdi MD 10/07/2019 07:45 A

## 2019-10-08 VITALS — DIASTOLIC BLOOD PRESSURE: 68 MMHG | SYSTOLIC BLOOD PRESSURE: 113 MMHG

## 2019-10-08 VITALS — DIASTOLIC BLOOD PRESSURE: 60 MMHG | SYSTOLIC BLOOD PRESSURE: 118 MMHG

## 2019-10-08 VITALS — DIASTOLIC BLOOD PRESSURE: 68 MMHG | SYSTOLIC BLOOD PRESSURE: 120 MMHG

## 2019-10-08 VITALS — DIASTOLIC BLOOD PRESSURE: 71 MMHG | SYSTOLIC BLOOD PRESSURE: 115 MMHG

## 2019-10-08 LAB
ALBUMIN SERPL BCG-MCNC: 3.2 GM/DL (ref 3.2–5.2)
ALT SERPL W P-5'-P-CCNC: 15 U/L (ref 12–78)
BASOPHILS # BLD AUTO: 0 10^3/UL (ref 0–0.2)
BASOPHILS NFR BLD AUTO: 0.7 % (ref 0–1)
BILIRUB SERPL-MCNC: 0.2 MG/DL (ref 0.2–1)
BUN SERPL-MCNC: 11 MG/DL (ref 7–18)
CALCIUM SERPL-MCNC: 8.3 MG/DL (ref 8.5–10.1)
CHLORIDE SERPL-SCNC: 108 MEQ/L (ref 98–107)
CO2 SERPL-SCNC: 26 MEQ/L (ref 21–32)
CREAT SERPL-MCNC: 0.8 MG/DL (ref 0.7–1.3)
EOSINOPHIL # BLD AUTO: 0.2 10^3/UL (ref 0–0.5)
EOSINOPHIL NFR BLD AUTO: 3.8 % (ref 0–3)
GFR SERPL CREATININE-BSD FRML MDRD: > 60 ML/MIN/{1.73_M2} (ref 56–?)
GLUCOSE SERPL-MCNC: 166 MG/DL (ref 70–100)
HCT VFR BLD AUTO: 37.4 % (ref 42–52)
HGB BLD-MCNC: 12.7 G/DL (ref 13.5–17.5)
LYMPHOCYTES # BLD AUTO: 1.6 10^3/UL (ref 1.5–5)
LYMPHOCYTES NFR BLD AUTO: 35.9 % (ref 24–44)
MAGNESIUM SERPL-MCNC: 2 MG/DL (ref 1.8–2.4)
MCH RBC QN AUTO: 30.4 PG (ref 27–33)
MCHC RBC AUTO-ENTMCNC: 34 G/DL (ref 32–36.5)
MCV RBC AUTO: 89.5 FL (ref 80–96)
MONOCYTES # BLD AUTO: 0.5 10^3/UL (ref 0–0.8)
MONOCYTES NFR BLD AUTO: 11.4 % (ref 0–5)
NEUTROPHILS # BLD AUTO: 2.2 10^3/UL (ref 1.5–8.5)
NEUTROPHILS NFR BLD AUTO: 48 % (ref 36–66)
PLATELET # BLD AUTO: 287 10^3/UL (ref 150–450)
POTASSIUM SERPL-SCNC: 4 MEQ/L (ref 3.5–5.1)
PROT SERPL-MCNC: 6.1 GM/DL (ref 6.4–8.2)
RBC # BLD AUTO: 4.18 10^6/UL (ref 4.3–6.1)
SODIUM SERPL-SCNC: 140 MEQ/L (ref 136–145)
WBC # BLD AUTO: 4.5 10^3/UL (ref 4–10)

## 2019-10-08 RX ADMIN — DULOXETINE HYDROCHLORIDE SCH MG: 30 CAPSULE, DELAYED RELEASE ORAL at 08:11

## 2019-10-08 RX ADMIN — SODIUM CHLORIDE, PRESERVATIVE FREE SCH ML: 5 INJECTION INTRAVENOUS at 06:36

## 2019-10-08 RX ADMIN — ATORVASTATIN CALCIUM SCH MG: 10 TABLET, FILM COATED ORAL at 08:11

## 2019-10-08 RX ADMIN — SODIUM CHLORIDE, PRESERVATIVE FREE SCH ML: 5 INJECTION INTRAVENOUS at 13:57

## 2019-10-08 RX ADMIN — ASPIRIN SCH MG: 81 TABLET ORAL at 08:10

## 2019-10-08 RX ADMIN — METOCLOPRAMIDE SCH MG: 5 TABLET ORAL at 08:10

## 2019-10-08 RX ADMIN — ENOXAPARIN SODIUM SCH MG: 40 INJECTION SUBCUTANEOUS at 06:36

## 2019-10-08 RX ADMIN — INSULIN LISPRO SCH UNITS: 100 INJECTION, SOLUTION INTRAVENOUS; SUBCUTANEOUS at 08:12

## 2019-10-08 RX ADMIN — LISINOPRIL SCH MG: 5 TABLET ORAL at 08:12

## 2019-10-08 RX ADMIN — INSULIN LISPRO SCH UNITS: 100 INJECTION, SOLUTION INTRAVENOUS; SUBCUTANEOUS at 12:00

## 2019-10-08 RX ADMIN — METOCLOPRAMIDE SCH MG: 5 TABLET ORAL at 13:00

## 2019-10-08 NOTE — DS.PDOC
Discharge Summary


General


Date of Admission


Oct 6, 2019 at 22:16


Date of Discharge


10/08/2019





Discharge Summary


PROCEDURES PERFORMED DURING STAY: None.





ADMITTING DIAGNOSES: 


1. DKA.





DISCHARGE DIAGNOSES:


1. DKA.





COMPLICATIONS/CHIEF COMPLAINT: Dka (Diabetic Ketoacidoses).





HISTORY OF PRESENT ILLNESS: 59-year-old male with past medical history of 

diabetes, was admitted for DKA; he was initially admitted to the ICU with an 

insulin drip. He remained on the insulin drip until anion gap metabolic acidosis

resolved. He was transferred to the regular floor after being placed on long-

acting insulin. He reports complete resolution of symptoms, has no complaints at

this time, tolerating diet, blood sugars within acceptable range. Patient is 

ambulating, denies any shortness of breath, chest pain, abdominal pain, nausea, 

vomiting, diarrhea or constipation. Patient is requesting to go home today.





HOSPITAL COURSE: As per above. 





DISCHARGE MEDICATIONS: Please see below.


 


ALLERGIES: Please see below.





PHYSICAL EXAMINATION ON DISCHARGE:


VITAL SIGNS: Please see below.


GENERAL: No distress


HEENT: Normocephalic, atraumatic, moist mucous membranes


NECK: Supple


CARDIOVASCULAR EXAMINATION: S1, S2, no murmurs appreciated


RESPIRATORY EXAMINATION: Clear to auscultation, no wheezing


ABDOMINAL EXAMINATION: Soft, nontender, nondistended, positive bowel sounds


EXTREMITIES: Range of motion intact


SKIN: No rash


NEUROLOGICAL EXAMINATION:. Alert and oriented 3, no focal deficits


PSYCHIATRIC EXAMINATION: Calm





LABORATORY DATA: Please see below.





PROGNOSIS: Good





ACTIVITY: As tolerated.





DIET: Consistent carbs





DISCHARGE PLAN: Patient is to follow-up with endocrinology and PCP for further 

outpatient management and ways to prevent further episodes of DKA.





DISPOSITION: Home.





DISCHARGE INSTRUCTIONS:


1. Please follow-up with the endocrinologist and primary care physician within 

one to 2 weeks.





DISCHARGE CONDITION: Stable.





TIME SPENT ON DISCHARGE: Greater than 27 minutes minutes.





Vital Signs/I&Os





Vital Signs








  Date Time  Temp Pulse Resp B/P (MAP) Pulse Ox O2 Delivery O2 Flow Rate FiO2


 


10/8/19 14:00 97.2 90 16 120/68 (85) 99   


 


10/6/19 23:30      Room Air  














I&O- Last 24 Hours up to 6 AM 


 


 10/8/19





 06:00


 


Intake Total 3736 ml


 


Output Total 1350 ml


 


Balance 2386 ml











Laboratory Data


Labs 24H


Laboratory Tests 2


10/7/19 17:17: Bedside Glucose (Misc Panel) 265H


10/7/19 20:09: Bedside Glucose (Misc Panel) 244H


10/7/19 23:49: Bedside Glucose (Misc Panel) 235H


10/8/19 02:36: Bedside Glucose (Misc Panel) 73


10/8/19 03:16: Bedside Glucose (Misc Panel) 76


10/8/19 04:01: Bedside Glucose (Misc Panel) 135H


10/8/19 05:05: 


Immature Granulocyte % (Auto) 0.2, White Blood Count 4.5, Red Blood Count 4.18L,

Hemoglobin 12.7L, Hematocrit 37.4L, Mean Corpuscular Volume 89.5, Mean 

Corpuscular Hemoglobin 30.4, Mean Corpuscular Hemoglobin Concent 34.0, Red Cell 

Distribution Width 12.5, Platelet Count 287, Neutrophils (%) (Auto) 48.0, 

Lymphocytes (%) (Auto) 35.9, Monocytes (%) (Auto) 11.4H, Eosinophils (%) (Auto) 

3.8H, Basophils (%) (Auto) 0.7, Neutrophils # (Auto) 2.2, Lymphocytes # (Auto) 

1.6, Monocytes # (Auto) 0.5, Eosinophils # (Auto) 0.2, Basophils # (Auto) 0.0, 

Nucleated Red Blood Cells % (auto) 0.0, Anion Gap 6L, Glomerular Filtration Rate

> 60.0, Blood Urea Nitrogen 11, Creatinine 0.80, Sodium Level 140, Potassium 

Level 4.0, Chloride Level 108H, Carbon Dioxide Level 26, Calcium Level 8.3L, 

Aspartate Amino Transf (AST/SGOT) 11, Alanine Aminotransferase (ALT/SGPT) 15, 

Alkaline Phosphatase 79, Total Bilirubin 0.2#, Total Protein 6.1L, Albumin 3.2#,

Magnesium Level 2.0, Albumin/Globulin Ratio 1.10


10/8/19 11:26: Bedside Glucose (Misc Panel) 80


10/8/19 11:48: Bedside Glucose (Misc Panel) 86


CBC/BMP


Laboratory Tests


10/8/19 05:05








Red Blood Count 4.18 L, Mean Corpuscular Volume 89.5, Mean Corpuscular 

Hemoglobin 30.4, Mean Corpuscular Hemoglobin Concent 34.0, Red Cell Distribution

Width 12.5, Neutrophils (%) (Auto) 48.0, Lymphocytes (%) (Auto) 35.9, Monocytes 

(%) (Auto) 11.4 H, Eosinophils (%) (Auto) 3.8 H, Basophils (%) (Auto) 0.7, 

Neutrophils # (Auto) 2.2, Lymphocytes # (Auto) 1.6, Monocytes # (Auto) 0.5, 

Eosinophils # (Auto) 0.2, Basophils # (Auto) 0.0, Calcium Level 8.3 L, Aspartate

Amino Transf (AST/SGOT) 11, Alanine Aminotransferase (ALT/SGPT) 15, Alkaline 

Phosphatase 79, Total Bilirubin 0.2 #, Total Protein 6.1 L, Albumin 3.2 #


FSBS





Laboratory Tests








Test


 10/7/19


17:17 10/7/19


20:09 10/7/19


23:49 10/8/19


02:36 Range/Units


 


 


Bedside Glucose (Misc Panel) 265 244 235 73   MG/DL


 


Test


 10/8/19


03:16 10/8/19


04:01 10/8/19


11:26 10/8/19


11:48 Range/Units


 


 


Bedside Glucose (Misc Panel) 76 135 80 86   MG/DL











Discharge Medications


Scheduled


Aspirin (Aspirin EC) 81 Mg Tab, 81 MG PO DAILY, (Reported)


Atorvastatin Calcium (Atorvastatin Calcium) 10 Mg Tab, 10 MG PO DAILY, 

(Reported)


Duloxetine Hcl (Cymbalta) 60 Mg Cap, 60 MG PO DAILY, (Reported)


Duloxetine Hcl (Duloxetine HCl) 30 Mg Cap, 30 MG PO QPM, (Reported)


Insulin Glargine,Hum.rec.anlog (Basaglar Kwikpen U-100) 100 Unit/1 Ml 

Insuln.pen, 65 UNIT SC DAILY, (Reported)


Insulin Human Lispro (Novolog) 100 U/Ml Inj, 1 DOSE SC AC, (Reported)


   PER SLIDING SCALE 


Lisinopril (Lisinopril) 5 Mg Tab, 5 MG PO DAILY, (Reported)


Metoclopramide Hcl (Reglan) 5 Mg Tab, 5 MG PO QID, (Reported)


Zolpidem Tartrate (Ambien) 10 Mg Tab, 10 MG PO QHS, (Reported)





Scheduled PRN


Acetaminophen with Codeine (Acetaminophen-Cod #3 Tablet) 1 Tab Tab, 2 TAB PO Q4H

PRN for PAIN, (Reported)


Meclizine HCl (Meclizine HCl) 25 Mg Tablet, 25 MG PO TID PRN for NAUSEA OR 

VOMITING, (Reported)





Allergies


Coded Allergies:  


     pregabalin (Verified  Allergy, Unknown, 10/6/19)











GONDAL,KHUBAIB N. MD            Oct 8, 2019 15:22

## 2019-10-10 NOTE — ECGEPIP
[facility]

                                       

                                       Test Date:    2019-10-07

Pat Name:     FRANCISCO BLANCA             Department:   

Patient ID:   [id number]                 Room:         

Gender:       Male                     Technician:   ROSE

:          1960               Requested By: SHILPA

Order Number: [id number]     Reading MD:   Jennyfer Plummer

                                 Measurements

Intervals                              Axis          

Rate:         65                       P:            -4

KS:           156                      QRS:          2

QRSD:         93                       T:            -43

QT:           388                                    

QTc:          404                                    

                           Interpretive Statements

SINUS RHYTHM

NONSPECIFIC T-WAVE ABNORMALITY

NO CHANGE COMPARED TO 10/6/19

Electronically Signed on 10-9-2019 8:11:03 EDT by Jennyfer Plummer

## 2020-01-25 ENCOUNTER — HOSPITAL ENCOUNTER (INPATIENT)
Dept: HOSPITAL 53 - M ED | Age: 60
LOS: 3 days | Discharge: HOME | DRG: 249 | End: 2020-01-28
Attending: INTERNAL MEDICINE | Admitting: INTERNAL MEDICINE
Payer: COMMERCIAL

## 2020-01-25 VITALS — BODY MASS INDEX: 18.9 KG/M2 | WEIGHT: 147.27 LBS | HEIGHT: 74 IN

## 2020-01-25 DIAGNOSIS — N17.9: ICD-10-CM

## 2020-01-25 DIAGNOSIS — G47.00: ICD-10-CM

## 2020-01-25 DIAGNOSIS — E78.5: ICD-10-CM

## 2020-01-25 DIAGNOSIS — I10: ICD-10-CM

## 2020-01-25 DIAGNOSIS — Z79.82: ICD-10-CM

## 2020-01-25 DIAGNOSIS — E10.10: ICD-10-CM

## 2020-01-25 DIAGNOSIS — A08.4: Primary | ICD-10-CM

## 2020-01-25 DIAGNOSIS — E10.43: ICD-10-CM

## 2020-01-25 DIAGNOSIS — F17.200: ICD-10-CM

## 2020-01-25 DIAGNOSIS — M54.5: ICD-10-CM

## 2020-01-25 DIAGNOSIS — E10.40: ICD-10-CM

## 2020-01-25 DIAGNOSIS — F32.9: ICD-10-CM

## 2020-01-25 DIAGNOSIS — D72.823: ICD-10-CM

## 2020-01-25 DIAGNOSIS — Z88.8: ICD-10-CM

## 2020-01-25 DIAGNOSIS — Z79.899: ICD-10-CM

## 2020-01-25 LAB
ALBUMIN SERPL BCG-MCNC: 4.4 GM/DL (ref 3.2–5.2)
ALT SERPL W P-5'-P-CCNC: 25 U/L (ref 12–78)
BASE EXCESS BLDV CALC-SCNC: -24.8 MMOL/L (ref -2–2)
BASOPHILS # BLD AUTO: 0.1 10^3/UL (ref 0–0.2)
BASOPHILS NFR BLD AUTO: 0.3 % (ref 0–1)
BILIRUB SERPL-MCNC: 0.5 MG/DL (ref 0.2–1)
BUN SERPL-MCNC: 34 MG/DL (ref 7–18)
CALCIUM SERPL-MCNC: 8.9 MG/DL (ref 8.5–10.1)
CHLORIDE SERPL-SCNC: 95 MEQ/L (ref 98–107)
CK MB CFR.DF SERPL CALC: 2.53
CK MB SERPL-MCNC: 2.3 NG/ML (ref ?–3.6)
CK SERPL-CCNC: 91 U/L (ref 39–308)
CO2 BLDV CALC-SCNC: 6.3 MEQ/L (ref 24–28)
CO2 SERPL-SCNC: 7 MEQ/L (ref 21–32)
CREAT SERPL-MCNC: 1.67 MG/DL (ref 0.7–1.3)
EOSINOPHIL # BLD AUTO: 0 10^3/UL (ref 0–0.5)
EOSINOPHIL NFR BLD AUTO: 0.1 % (ref 0–3)
EST. AVERAGE GLUCOSE BLD GHB EST-MCNC: 315 MG/DL (ref 60–110)
GFR SERPL CREATININE-BSD FRML MDRD: 45.4 ML/MIN/{1.73_M2} (ref 56–?)
GLUCOSE SERPL-MCNC: 738 MG/DL (ref 70–100)
HCO3 BLDV-SCNC: 5.6 MEQ/L (ref 23–27)
HCO3 STD BLDV-SCNC: 7.3 MEQ/L
HCT VFR BLD AUTO: 43 % (ref 42–52)
HGB BLD-MCNC: 13.1 G/DL (ref 13.5–17.5)
LYMPHOCYTES # BLD AUTO: 1.6 10^3/UL (ref 1.5–5)
LYMPHOCYTES NFR BLD AUTO: 5.5 % (ref 24–44)
MCH RBC QN AUTO: 29.2 PG (ref 27–33)
MCHC RBC AUTO-ENTMCNC: 30.5 G/DL (ref 32–36.5)
MCV RBC AUTO: 96 FL (ref 80–96)
MONOCYTES # BLD AUTO: 1.7 10^3/UL (ref 0–0.8)
MONOCYTES NFR BLD AUTO: 5.5 % (ref 0–5)
NEUTROPHILS # BLD AUTO: 26.2 10^3/UL (ref 1.5–8.5)
NEUTROPHILS NFR BLD AUTO: 87.1 % (ref 36–66)
PCO2 BLDV: 24 MMHG (ref 38–50)
PH BLDV: 6.98 UNITS (ref 7.33–7.43)
PLATELET # BLD AUTO: 374 10^3/UL (ref 150–450)
PO2 BLDV: 59.4 MMHG (ref 30–50)
POTASSIUM SERPL-SCNC: 5.9 MEQ/L (ref 3.5–5.1)
PROT SERPL-MCNC: 7.4 GM/DL (ref 6.4–8.2)
RBC # BLD AUTO: 4.48 10^6/UL (ref 4.3–6.1)
SAO2 % BLDV: 85.4 % (ref 60–80)
SODIUM SERPL-SCNC: 128 MEQ/L (ref 136–145)
TROPONIN I SERPL-MCNC: < 0.02 NG/ML (ref ?–0.1)
WBC # BLD AUTO: 30.1 10^3/UL (ref 4–10)

## 2020-01-26 VITALS — DIASTOLIC BLOOD PRESSURE: 65 MMHG | SYSTOLIC BLOOD PRESSURE: 115 MMHG

## 2020-01-26 VITALS — DIASTOLIC BLOOD PRESSURE: 63 MMHG | SYSTOLIC BLOOD PRESSURE: 109 MMHG

## 2020-01-26 VITALS — SYSTOLIC BLOOD PRESSURE: 102 MMHG | DIASTOLIC BLOOD PRESSURE: 56 MMHG

## 2020-01-26 VITALS — SYSTOLIC BLOOD PRESSURE: 98 MMHG | DIASTOLIC BLOOD PRESSURE: 54 MMHG

## 2020-01-26 VITALS — DIASTOLIC BLOOD PRESSURE: 56 MMHG | SYSTOLIC BLOOD PRESSURE: 96 MMHG

## 2020-01-26 VITALS — DIASTOLIC BLOOD PRESSURE: 65 MMHG | SYSTOLIC BLOOD PRESSURE: 108 MMHG

## 2020-01-26 VITALS — SYSTOLIC BLOOD PRESSURE: 99 MMHG | DIASTOLIC BLOOD PRESSURE: 57 MMHG

## 2020-01-26 VITALS — DIASTOLIC BLOOD PRESSURE: 63 MMHG | SYSTOLIC BLOOD PRESSURE: 104 MMHG

## 2020-01-26 VITALS — DIASTOLIC BLOOD PRESSURE: 55 MMHG | SYSTOLIC BLOOD PRESSURE: 96 MMHG

## 2020-01-26 LAB
B-OH-BUTYR SERPL-MCNC: > 46 MG/DL (ref ?–2.81)
BUN SERPL-MCNC: 18 MG/DL (ref 7–18)
BUN SERPL-MCNC: 22 MG/DL (ref 7–18)
BUN SERPL-MCNC: 25 MG/DL (ref 7–18)
BUN SERPL-MCNC: 33 MG/DL (ref 7–18)
CALCIUM SERPL-MCNC: 8 MG/DL (ref 8.5–10.1)
CALCIUM SERPL-MCNC: 8.1 MG/DL (ref 8.5–10.1)
CALCIUM SERPL-MCNC: 8.3 MG/DL (ref 8.5–10.1)
CALCIUM SERPL-MCNC: 8.3 MG/DL (ref 8.5–10.1)
CHLORIDE SERPL-SCNC: 105 MEQ/L (ref 98–107)
CHLORIDE SERPL-SCNC: 109 MEQ/L (ref 98–107)
CHLORIDE SERPL-SCNC: 110 MEQ/L (ref 98–107)
CHLORIDE SERPL-SCNC: 110 MEQ/L (ref 98–107)
CO2 SERPL-SCNC: 14 MEQ/L (ref 21–32)
CO2 SERPL-SCNC: 18 MEQ/L (ref 21–32)
CO2 SERPL-SCNC: 21 MEQ/L (ref 21–32)
CO2 SERPL-SCNC: 9 MEQ/L (ref 21–32)
CREAT SERPL-MCNC: 1 MG/DL (ref 0.7–1.3)
CREAT SERPL-MCNC: 1.12 MG/DL (ref 0.7–1.3)
CREAT SERPL-MCNC: 1.24 MG/DL (ref 0.7–1.3)
CREAT SERPL-MCNC: 1.46 MG/DL (ref 0.7–1.3)
GFR SERPL CREATININE-BSD FRML MDRD: 53 ML/MIN/{1.73_M2} (ref 56–?)
GFR SERPL CREATININE-BSD FRML MDRD: > 60 ML/MIN/{1.73_M2} (ref 56–?)
GLUCOSE SERPL-MCNC: 157 MG/DL (ref 70–100)
GLUCOSE SERPL-MCNC: 178 MG/DL (ref 70–100)
GLUCOSE SERPL-MCNC: 209 MG/DL (ref 70–100)
GLUCOSE SERPL-MCNC: 373 MG/DL (ref 70–100)
HCT VFR BLD AUTO: 34.8 % (ref 42–52)
HGB BLD-MCNC: 11.4 G/DL (ref 13.5–17.5)
MAGNESIUM SERPL-MCNC: 2.1 MG/DL (ref 1.8–2.4)
MAGNESIUM SERPL-MCNC: 2.4 MG/DL (ref 1.8–2.4)
MCH RBC QN AUTO: 29.2 PG (ref 27–33)
MCHC RBC AUTO-ENTMCNC: 32.8 G/DL (ref 32–36.5)
MCV RBC AUTO: 89 FL (ref 80–96)
PLATELET # BLD AUTO: 305 10^3/UL (ref 150–450)
POTASSIUM SERPL-SCNC: 4 MEQ/L (ref 3.5–5.1)
POTASSIUM SERPL-SCNC: 4.1 MEQ/L (ref 3.5–5.1)
POTASSIUM SERPL-SCNC: 4.2 MEQ/L (ref 3.5–5.1)
POTASSIUM SERPL-SCNC: 4.5 MEQ/L (ref 3.5–5.1)
RBC # BLD AUTO: 3.91 10^6/UL (ref 4.3–6.1)
SODIUM SERPL-SCNC: 134 MEQ/L (ref 136–145)
SODIUM SERPL-SCNC: 135 MEQ/L (ref 136–145)
SODIUM SERPL-SCNC: 136 MEQ/L (ref 136–145)
SODIUM SERPL-SCNC: 137 MEQ/L (ref 136–145)
WBC # BLD AUTO: 20.8 10^3/UL (ref 4–10)

## 2020-01-26 RX ADMIN — ACETAMINOPHEN PRN MG: 325 TABLET ORAL at 17:25

## 2020-01-26 RX ADMIN — METOCLOPRAMIDE SCH MG: 5 TABLET ORAL at 17:23

## 2020-01-26 RX ADMIN — ACETAMINOPHEN PRN MG: 325 TABLET ORAL at 13:05

## 2020-01-26 RX ADMIN — INSULIN LISPRO SCH UNITS: 100 INJECTION, SOLUTION INTRAVENOUS; SUBCUTANEOUS at 20:16

## 2020-01-26 RX ADMIN — ASPIRIN SCH MG: 81 TABLET ORAL at 09:20

## 2020-01-26 RX ADMIN — SODIUM CHLORIDE SCH UNITS: 4.5 INJECTION, SOLUTION INTRAVENOUS at 17:23

## 2020-01-26 RX ADMIN — Medication SCH: at 05:08

## 2020-01-26 RX ADMIN — POTASSIUM CHLORIDE, DEXTROSE MONOHYDRATE AND SODIUM CHLORIDE SCH MLS/HR: 150; 5; 450 INJECTION, SOLUTION INTRAVENOUS at 17:23

## 2020-01-26 RX ADMIN — SODIUM CHLORIDE SCH UNITS: 4.5 INJECTION, SOLUTION INTRAVENOUS at 06:34

## 2020-01-26 RX ADMIN — Medication SCH: at 07:03

## 2020-01-26 RX ADMIN — Medication SCH: at 03:09

## 2020-01-26 RX ADMIN — Medication SCH: at 07:59

## 2020-01-26 RX ADMIN — ACETAMINOPHEN PRN MG: 325 TABLET ORAL at 21:41

## 2020-01-26 RX ADMIN — POTASSIUM CHLORIDE, DEXTROSE MONOHYDRATE AND SODIUM CHLORIDE SCH MLS/HR: 150; 5; 450 INJECTION, SOLUTION INTRAVENOUS at 08:28

## 2020-01-26 RX ADMIN — INSULIN LISPRO SCH UNITS: 100 INJECTION, SOLUTION INTRAVENOUS; SUBCUTANEOUS at 17:23

## 2020-01-26 RX ADMIN — POTASSIUM CHLORIDE, DEXTROSE MONOHYDRATE AND SODIUM CHLORIDE SCH MLS/HR: 150; 5; 450 INJECTION, SOLUTION INTRAVENOUS at 23:30

## 2020-01-26 RX ADMIN — METOCLOPRAMIDE SCH MG: 5 TABLET ORAL at 11:15

## 2020-01-26 RX ADMIN — Medication SCH: at 16:11

## 2020-01-26 RX ADMIN — Medication SCH: at 15:08

## 2020-01-26 RX ADMIN — INSULIN DETEMIR SCH UNITS: 100 INJECTION, SOLUTION SUBCUTANEOUS at 20:19

## 2020-01-26 RX ADMIN — POTASSIUM CHLORIDE, DEXTROSE MONOHYDRATE AND SODIUM CHLORIDE SCH MLS/HR: 150; 5; 450 INJECTION, SOLUTION INTRAVENOUS at 13:16

## 2020-01-26 RX ADMIN — METOCLOPRAMIDE SCH MG: 5 TABLET ORAL at 20:19

## 2020-01-26 RX ADMIN — Medication SCH: at 04:09

## 2020-01-26 RX ADMIN — DULOXETINE HYDROCHLORIDE SCH MG: 30 CAPSULE, DELAYED RELEASE ORAL at 09:20

## 2020-01-26 RX ADMIN — ATORVASTATIN CALCIUM SCH MG: 20 TABLET, FILM COATED ORAL at 09:20

## 2020-01-26 NOTE — REPVR
PROCEDURE INFORMATION: 

Exam: CT Abdomen And Pelvis Without Contrast 

Exam date and time: 1/26/2020 1:57 AM 

Age: 59 years old 

Clinical indication: Vomiting; Abdominal pain; Generalized; Additional info: 

Abd pain, vomiting, elev wbc; Unclear pathology 



TECHNIQUE: 

Imaging protocol: Computed tomography of the abdomen and pelvis without 

contrast. 

Radiation optimization: All CT scans at this facility use at least one of these 

dose optimization techniques: automated exposure control; mA and/or kV 

adjustment per patient size (includes targeted exams where dose is matched to 

clinical indication); or iterative reconstruction. 



COMPARISON: 

No relevant prior studies available. 



FINDINGS: 

Lungs: No suspicious mass or airspace process in the visualized lung bases. 



Liver: Liver is decreased in density, consistent with fatty infiltration. 

Gallbladder and bile ducts: Gallbladder is present and shows no evidence of 

gallstone. 

Pancreas: Noncontrast pancreas shows no obvious mass or adjacent fluid. 

Spleen: Noncontrast spleen shows no obvious focal deformity. 

Adrenals: Adrenal glands are normal in appearance. 

Kidneys and ureters: Kidneys show no stone or hydronephrosis. 

Stomach and bowel: No evidence of small bowel obstruction. No evidence of acute 

diverticulitis. 

Appendix: Normal caliber appendix is identified, with no adjacent inflammation. 

Intraperitoneal space: No pneumoperitoneum. 

Vasculature: Atherosclerotic change present in the aorta, without aneurysm. 

Lymph nodes: No enlarged lymph nodes. 



Bladder: Urinary bladder appears normal. 

Reproductive: Unremarkable as visualized. 

Bones/joints: Chronic appearing L5 pars inter-articularis defects are present. 

Degenerative changes are seen in the lumbar spine with disc height loss, 

endplate osteophytes and hypertrophic facet arthropathy. Left femur neck 

osseous metaplasia changes, seen in patients with hip impingement 

Soft tissues: Fat-containing right inguinal hernia is present. 

Other findings: Limited evaluation without enteric or IV contrast. 



IMPRESSION: 

1. No evidence of acute appendicitis, renal stone or obstruction and no 

explanation for vomiting and elevated white count 

2. Hepatic steatosis. 



Electronically signed by: Kaveh Pritchard On 01/26/2020  02:21:13 AM

## 2020-01-26 NOTE — REP
AP PORTABLE CHEST:  01/25/2020.

 

Comparison:  10/06/2019.

 

Clinical history:  Dyspnea.  Hyperglycemia.  Possible pneumonia.

 

Findings:  AP portable chest shows the lungs well inflated.  CP angles are

sharply defined without effusion.  No lateral pleural thickening, apical scarring

or pneumothorax.  No consolidation, atelectasis or mass.  The heart, mediastinal

and hilar contours were unchanged.  The aorta is calcified at the arch but

without aneurysm.  Airway midline.  There are degenerative changes in the

shoulders and spine.

 

Impression:

 

1.  No acute cardiopulmonary disease, stable chest.

 

 

Electronically Signed by

Al Barraza MD 01/26/2020 08:21 P

## 2020-01-26 NOTE — HPEPDOC
[facility] Medical History & Physical


Date of Admission


2020


Date of Service:  2020





History and Physical


CHIEF COMPLAINT: Nausea vomiting diarrhea and hyperglycemia





HISTORY OF PRESENT ILLNESS: This is a 59-year-old male who presented to the ER 

for nausea, vomiting, diarrhea and hyperglycemia the last 48 hours. He was 

brought in via EMS and his daughter was at bedside to help with the story. He 

states the last 2 days hes noticed nausea, vomiting, diarrhea which he contr

ibuted to a viral issue for his granddaughter was sick recently. He states he is

compliant with all his medications until today due to the vomiting. He states he

has nonbilious nonbloody vomiting and has experienced up to 8 episodes today. He

also admits to having watery loose stools but is unable to quantify for me. He 

denies any blood in his stool or any black tarry consistency. Patient admits to 

having some abdominal pain especially the lower quadrant. He denies any fevers, 

chills, night sweats. He does endorse increased thirst and increased urination 

but no chest pain or shortness of breath. In the ER he was found to have a 

glucose of 738 with an anion gap of 26. His white count returned elevated at 

30.1. He was given 1 dose of vancomycin and Zosyn in the ER and was started on 

insulin drip after getting 8 units of regular insulin. Hospitalist team was then

called for admission for DKA.


PAST MEDICAL HISTORY:


1. Type 1 diabetes located by neuropathy and gastroparesis


2. Dyslipidemia


3. Insomnia


4. Chronic back pain 


5. Depression


6. Hypertension








HOME MEDICATIONS: Please see below. 





ALLERGIES: Please see below





PAST SURGICAL HISTORY:


1. Laminectomy.


2. Bilateral Hernia repair 





SOCIAL HISTORY: Tobacco use: Current smoker. Denies sleeping ETOH: 

Occasionally, Illicit drug use: Denies, CODE STATUS: Full code





FAMILY HISTORY: Reviewed and noncontributory. Positive for leukemia and bladder 

cancer





REVIEW OF SYSTEMS: 10 systems reviewed and negative other than HPI





PHYSICAL EXAMINATION:


VITAL SIGNS: See below


GENERAL: Pleasant 79-year-old male laying in bed awake alert oriented speaking 

in complete sentences no acute distress but appears uncomfortable


HEENT: Atraumatic normocephalic pupils equally round and reactive, goatee and 

place but membranes appear dry with no obvious skin breakdown


CARDIOVASCULAR: S1 S2 regular no additional heart sounds appreciated.


RESPIRATORY: Clear to auscultation bilaterally.


ABDOMINAL: Hypoactive bowel sounds, soft nondistended but slightly tender to 

touch in the lower quadrants. No skin breakdown noted


EXTREMITIES: No clubbing cyanosis or edema. Minimal healing skin abrasion 

appreciated on the shins bilaterally. But no  open wounds or cellulitis noted.


NEUROLOGICAL: no gross focal deficits appreciated


PSYCHOLOGICAL: Appropriate





LABORATORY DATA: See below.





MICROBIOLOGY: Please see below. 





IMAGIN2020 


Chest x-ray - official report currently pending. When reviewed by me, appears 

negative for any acute pathology.


ASSESSMENT & PLAN: This is a 39-year-old male was presented with nausea vomiting

diarrhea and hypoglycemia for last 48 hours.





PROBLEMS:


1. Nausea, vomiting and diarrhea possibly secondary to DKA. Insulin drip per 

protocol, BMP every 4 hours, normal saline 200 mL per hour frontal FSBS is less 

than 250 and will transition to D5 half-normal saline until the gap is closed. 

Nothing by mouth diet. Zofran on board for nausea. 





2. Leukocytosis. Unsure if this is reactive versus infectious. He is status post

1 dose of vancomycin and Zosyn in the ER there for is covered for the next 24 

hours. Blood cultures 2 was ordered but they were drawn after the Vanco and 

Zosyn was given. Chest x-ray was negative pathology but official report is still

pending. UA was negative. Cardiac markers 1 negative EKG showed sinus rhythm 

with no new changes. CT abdomen and pelvis with IV contrast, GI panel and repeat

lactic acid ordered. Patient does take prednisone outpatient for his chronic 

back pain who was recently given a three-day course of by mouth steroids as well

as a prednisone shot a couple weeks ago even though its been a while can 

possibly contribute to leukocytosis. We will trend his WBC and watch him 

clinically.


3. Pseudohyponatremia secondary to hyperglycemia. Corrected sodium is 138. 





4. Hyperlipidemia. Well hold atorvastatin while nothing by mouth.


5. Chronic back pain. Will hold home acetaminophen with codeine while nothing by

mouth


6. Depression. We will hold home duloxetine 60 mg Daily and 30mg QPM until DKA 

has resolved


7. Gastroparesis well hold home Reglan until DKA has resolved


8. Hypertension. Hold home lisinopril 





DVT PROPHYLAXIS: Heparin





DISPOSITION: At least 2 midnights





Vital Signs





Vital Signs








  Date Time  Temp Pulse Resp B/P (MAP) Pulse Ox O2 Delivery O2 Flow Rate FiO2


 


20 01:22 98.0  19   Room Air  


 


20 01:20  96   100   


 


20 01:16    111/69 (83)    











Laboratory Data


Labs 24H


Laboratory Tests 2


20 22:07: 


POC Glucose (Misc Panel) > 700*H, POC Sodium (Misc Panel) 125L, POC Potassium 

(Misc Panel) 6.0H, POC Chloride (Misc Panel) 100, POC Total CO2 (Misc Panel) 

8.0L, POC Blood Urea Nitrogen (Misc Panel 32H, POC Ionized Calcium (Misc Panel) 

5.0, POC Lactate (Misc Panel) 2.55*H, POC Creatinine (Misc Panel) 1.1, POC 

Hematocrit (Misc Panel) 44.0


20 22:14: 


Total Creatine Kinase 91, Creatine Kinase MB 2.3, Creatine Kinase MB Relative 

Index 2.53, Troponin I < 0.02


20 22:15: 


Immature Granulocyte % (Auto) 1.5, Neutrophils (%) (Auto) 87.1H, Lymphocytes (%)

(Auto) 5.5L, Monocytes (%) (Auto) 5.5H, Eosinophils (%) (Auto) 0.1, Basophils 

(%) (Auto) 0.3, Neutrophils # (Auto) 26.2H, Lymphocytes # (Auto) 1.6, Monocytes 

# (Auto) 1.7H, Eosinophils # (Auto) 0.0, Basophils # (Auto) 0.1, Nucleated Red 

Blood Cells % (auto) 0.0, Blood Gas Bicarbonate Standard 7.3, Venous Blood pH 

6.982L, Venous Blood Partial Pressure CO2 24.0L, Venous Blood Partial Pressure 

O2 59.4H, Venous Blood Total Carbon Dioxide 6.3L, Venous Blood HCO3 5.6L, Venous

Blood Oxygen Saturation 85.4H, Venous Blood Base Excess -24.8L, Anion Gap 26H, 

Glomerular Filtration Rate 45.4L, Estimated Mean Plasma Glucose 315H, Hemoglobin

A1c 12.6, Calcium Level 8.9, Magnesium Level 2.4, Total Bilirubin 0.5, Aspartate

Amino Transf (AST/SGOT) 20, Alanine Aminotransferase (ALT/SGPT) 25, Alkaline 

Phosphatase 88, Total Protein 7.4, Albumin 4.4, Albumin/Globulin Ratio 1.47


20 22:25: 


Urine Color STRAW, Urine Appearance CLEAR, Urine pH 5.0, Urine Specific Gravity 

1.016, Urine Protein 1+H, Urine Glucose (UA) 3+H, Urine Ketones 2+H, Urine Blood

1+H, Urine Nitrite NEGATIVE, Urine Bilirubin NEGATIVE, Urine Urobilinogen 0.2, 

Urine Leukocyte Esterase NEGATIVE, Urine WBC (Auto) 1, Urine RBC (Auto) 4H, 

Urine Hyaline Casts (Auto) 1, Urine Bacteria (Auto) NEGATIVE, Urine Squamous 

Epithelial Cells 0, Urine Mucus (Auto) SMALL, Urine Sperm (Auto) SMALLH


20 23:59: Bedside Glucose (Misc Panel) 575*H


20 01:11: Bedside Glucose (Misc Panel) 478H


CBC/BMP


Laboratory Tests


20 22:15











Home Medications


Scheduled


Aspirin (Aspirin EC) 81 Mg Tab, 81 MG PO DAILY


Atorvastatin Calcium (Atorvastatin Calcium) 20 Mg Tablet, 20 MG PO DAILY


Duloxetine Hcl (Cymbalta) 60 Mg Cap, 60 MG PO DAILY


Duloxetine Hcl (Duloxetine HCl) 30 Mg Cap, 30 MG PO QPM


Insulin Glargine,Hum.rec.anlog (Basaglar Kwikpen U-100) 100 Unit/1 Ml 

Insuln.pen, 65 UNIT SC DAILY


Insulin Human Lispro (Novolog) 100 U/Ml Inj, 1 DOSE SC AC


   PER SLIDING SCALE 


Lisinopril (Lisinopril) 5 Mg Tab, 5 MG PO DAILY


Metoclopramide Hcl (Reglan) 5 Mg Tab, 5 MG PO QID


Zolpidem Tartrate (Ambien) 10 Mg Tab, 10 MG PO QHS





Scheduled PRN


Acetaminophen with Codeine (Acetaminophen-Cod #3 Tablet) 1 Tab Tab, 2 TAB PO Q4H

PRN for PAIN


Meclizine HCl (Meclizine HCl) 25 Mg Tablet, 25 MG PO TID PRN for NAUSEA OR 

VOMITING





Allergies


Coded Allergies:  


     pregabalin (Verified  Allergy, Unknown, 10/6/19)





GME ATTESTATION


GME ATTESTATION


My faculty preceptor for this patient encounter was physically present during 

the encounter and was fully available. All aspects of the patient interview, 

examination, medical decision making process, and medical care plan development 

were reviewed and approved by the faculty preceptor. The faculty preceptor is 

aware and concurs with the plan as stated in the body of this note and will 

attest to such by his/her cosignature.





ATTENDING NOTE


I agree with Dr. Spivey's assessment of Mr. Beckett. Briefly, he is a 60 yo type 1 diabetic who presented with a fever in DKA, with a recent history of 

PO steroids for chronic back pain and possible sick contacts, whom we are 

admitting him to the ICU per DKA protocol on an insulin gtt, placed on empiric 

antibiotics with ongoing infectious workup.











PACO SPIVEY DO       2020 02:07


RAÚL HUGGINS MD   2020 07:31

## 2020-01-26 NOTE — ECGEPIP
[facility] - ED

                                       

                                       Test Date:    2020

Pat Name:     FRANCISCO BLANCA             Department:   

Patient ID:   [id number]                 Room:         [place] [address]

Gender:       Male                     Technician:   nathalie

:          1960               Requested By: KRISTINE BRICEÑO

Order Number: [id number]     Reading MD:   Maja Lundborg-Gray

                                 Measurements

Intervals                              Axis          

Rate:         98                       P:            81

DE:           168                      QRS:          75

QRSD:         79                       T:            92

QT:           334                                    

QTc:          427                                    

                           Interpretive Statements

SINUS RHYTHM

NONSPECIFIC ST T WAVE CHANGES

 

CW 10/7/19 RATE INCREASED

NONSPECIFIC ST T WAVE CHANGES

DELAYED R WAVE PROGRESSION

Electronically Signed on 2020 6:37:35 EST by Maja Lundborg-Gray
no

## 2020-01-27 VITALS — SYSTOLIC BLOOD PRESSURE: 137 MMHG | DIASTOLIC BLOOD PRESSURE: 79 MMHG

## 2020-01-27 VITALS — SYSTOLIC BLOOD PRESSURE: 155 MMHG | DIASTOLIC BLOOD PRESSURE: 80 MMHG

## 2020-01-27 VITALS — SYSTOLIC BLOOD PRESSURE: 133 MMHG | DIASTOLIC BLOOD PRESSURE: 80 MMHG

## 2020-01-27 VITALS — DIASTOLIC BLOOD PRESSURE: 79 MMHG | SYSTOLIC BLOOD PRESSURE: 128 MMHG

## 2020-01-27 VITALS — SYSTOLIC BLOOD PRESSURE: 136 MMHG | DIASTOLIC BLOOD PRESSURE: 82 MMHG

## 2020-01-27 VITALS — SYSTOLIC BLOOD PRESSURE: 133 MMHG | DIASTOLIC BLOOD PRESSURE: 79 MMHG

## 2020-01-27 LAB
BUN SERPL-MCNC: 8 MG/DL (ref 7–18)
CALCIUM SERPL-MCNC: 7.9 MG/DL (ref 8.5–10.1)
CHLORIDE SERPL-SCNC: 110 MEQ/L (ref 98–107)
CO2 SERPL-SCNC: 20 MEQ/L (ref 21–32)
CREAT SERPL-MCNC: 0.75 MG/DL (ref 0.7–1.3)
GFR SERPL CREATININE-BSD FRML MDRD: > 60 ML/MIN/{1.73_M2} (ref 56–?)
GLUCOSE SERPL-MCNC: 245 MG/DL (ref 70–100)
HCT VFR BLD AUTO: 32 % (ref 42–52)
HGB BLD-MCNC: 11 G/DL (ref 13.5–17.5)
MAGNESIUM SERPL-MCNC: 1.9 MG/DL (ref 1.8–2.4)
MCH RBC QN AUTO: 29.4 PG (ref 27–33)
MCHC RBC AUTO-ENTMCNC: 34.4 G/DL (ref 32–36.5)
MCV RBC AUTO: 85.6 FL (ref 80–96)
PLATELET # BLD AUTO: 262 10^3/UL (ref 150–450)
POTASSIUM SERPL-SCNC: 4 MEQ/L (ref 3.5–5.1)
RBC # BLD AUTO: 3.74 10^6/UL (ref 4.3–6.1)
SODIUM SERPL-SCNC: 134 MEQ/L (ref 136–145)
WBC # BLD AUTO: 10.7 10^3/UL (ref 4–10)

## 2020-01-27 RX ADMIN — POTASSIUM CHLORIDE, DEXTROSE MONOHYDRATE AND SODIUM CHLORIDE SCH MLS/HR: 150; 5; 450 INJECTION, SOLUTION INTRAVENOUS at 04:04

## 2020-01-27 RX ADMIN — INSULIN LISPRO SCH UNITS: 100 INJECTION, SOLUTION INTRAVENOUS; SUBCUTANEOUS at 17:32

## 2020-01-27 RX ADMIN — ACETAMINOPHEN PRN MG: 325 TABLET ORAL at 04:04

## 2020-01-27 RX ADMIN — ATORVASTATIN CALCIUM SCH MG: 20 TABLET, FILM COATED ORAL at 07:37

## 2020-01-27 RX ADMIN — INSULIN DETEMIR SCH UNITS: 100 INJECTION, SOLUTION SUBCUTANEOUS at 20:33

## 2020-01-27 RX ADMIN — OCTREOTIDE ACETATE PRN LOZ: KIT at 21:17

## 2020-01-27 RX ADMIN — ACETAMINOPHEN PRN MG: 325 TABLET ORAL at 17:33

## 2020-01-27 RX ADMIN — ACETAMINOPHEN PRN MG: 325 TABLET ORAL at 08:28

## 2020-01-27 RX ADMIN — METOCLOPRAMIDE SCH MG: 5 TABLET ORAL at 17:33

## 2020-01-27 RX ADMIN — INSULIN LISPRO SCH UNITS: 100 INJECTION, SOLUTION INTRAVENOUS; SUBCUTANEOUS at 12:24

## 2020-01-27 RX ADMIN — INSULIN LISPRO SCH UNITS: 100 INJECTION, SOLUTION INTRAVENOUS; SUBCUTANEOUS at 07:36

## 2020-01-27 RX ADMIN — METOCLOPRAMIDE SCH MG: 5 TABLET ORAL at 20:33

## 2020-01-27 RX ADMIN — ASPIRIN SCH MG: 81 TABLET ORAL at 07:37

## 2020-01-27 RX ADMIN — METOCLOPRAMIDE SCH MG: 5 TABLET ORAL at 07:37

## 2020-01-27 RX ADMIN — INSULIN LISPRO SCH UNITS: 100 INJECTION, SOLUTION INTRAVENOUS; SUBCUTANEOUS at 20:35

## 2020-01-27 RX ADMIN — POTASSIUM CHLORIDE, DEXTROSE MONOHYDRATE AND SODIUM CHLORIDE SCH MLS/HR: 150; 5; 450 INJECTION, SOLUTION INTRAVENOUS at 00:28

## 2020-01-27 RX ADMIN — SODIUM CHLORIDE SCH UNITS: 4.5 INJECTION, SOLUTION INTRAVENOUS at 06:20

## 2020-01-27 RX ADMIN — METOCLOPRAMIDE SCH MG: 5 TABLET ORAL at 12:24

## 2020-01-27 RX ADMIN — DULOXETINE HYDROCHLORIDE SCH MG: 30 CAPSULE, DELAYED RELEASE ORAL at 07:37

## 2020-01-27 RX ADMIN — SODIUM CHLORIDE SCH UNITS: 4.5 INJECTION, SOLUTION INTRAVENOUS at 17:32

## 2020-01-27 NOTE — IPN
DATE:  01/26/2020

 

Tereso is a patient of Calvin Garcia at the [facility], who was admitted to

the hospitalist's service into the intensive care unit (ICU) with diabetic

ketoacidosis.  He had, what sounds like a gastroenteritis, aching all over,

nausea, vomiting, and diarrhea.  He claims to be compliant with his insulin.  He

has a history of diabetic gastroparesis.  He feels better today, but still has

not eaten anything.  His anion gap has normalized.  He had leukocytosis on

admission, probably was reactive.  He is having no fever and, so far, there are

no active signs of infection.

 

PHYSICAL EXAMINATION:

99/57, afebrile, respiratory rate 18, 100% oxygen saturation.

GENERAL APPEARANCE:  Alert, conversant, no distress.

LUNGS:  Clear.

HEART:  Regular rate and rhythm.

ABDOMEN:  Soft, nontender.

No peripheral edema.

Mucous membranes look moist.

 

LABS:

White count is down to 20,000, hemoglobin 11.4, platelets 305.

 

Sodium 137, potassium 4.5, anion gap 13, BUN 25, creatinine 1.24.

 

Blood sugar 209.  Most recent fingerstick 189.

 

IMPRESSION:

1.  Diabetic ketoacidosis.  Will continue insulin drip through the morning.  Will

start some clear liquids.  Advance the diet as tolerated.  If he is able to

tolerate diet, we will probably stop the insulin drip and transfer him out of the

unit on sliding scale insulin and some basal insulin.

 

2.  Diabetic gastroparesis.  Will restart Reglan, which he uses as an

outpatient.

 

3.  Viral gastroenteritis.  Continue intravenous (IV) fluids for now.  Potassium

seems to be normalized.

 

4.  History of depression.  Will restart his duloxetine in order to avoid

serotonin-norepinephrine reuptake Inhibitor (SNRI) withdrawal symptoms.

 

5.  Hyperlipidemia.  Restart atorvastatin 20 mg daily and aspirin daily.

 

6.  Acute kidney injury.  This is resolved with hydration.

 

7.  Hypertension.  Hold his lisinopril, as he is still hypotensive.

## 2020-01-27 NOTE — IPN
DATE:  01/27/2020

 

Tereso is seen on [address] [facility].  Still has some nausea, crampy abdominal pain, diarrhea

from his gastroenteritis.  Blood sugars have come under good control.  His anion

gap is normalized.  He is off his insulin drip, just on a sliding scale insulin.

He is also on D5 because his oral intake has been poor.

 

PHYSICAL EXAMINATION:

Blood pressure 133/80.  Vital signs stable.

General Appearance:  Alert, conversant in no distress.

Lungs:  Clear.

Heart:  Regular rhythm.

Abdomen:  Soft.  Diffusely mildly nontender.

No peripheral edema.  Good distal pulses.

 

LABS:  Sodium 134, potassium 4, BUN 8, creatinine 0.7, glucose 245.  Blood sugars

have been generally below 200 for the last 24 hours.  White count is down to

10.7, hemoglobin 11, platelets 262.

 

IMPRESSION:

1.  Diabetic ketoacidosis:  He is on  sliding scale insulin, morning and night

with coverage as well as Detemir insulin as a basal insulin.  If he is able to

take breakfast well, I just talked to the nursing staff about calling and we will

discontinue his IV fluids.

 

2.  Leukocytosis:  Suspect reactive.   Blood cultures have been negative.  No

signs of active infection.

 

3.  Hyperlipidemia:  Continue his Lipitor 20 mg daily

 

4.  History of gastroparesis:  He take Reglan 5 mg in the morning and at bedtime,

which he will continue.

 

5.  Neuropathy:  He is on Cymbalta 60 mg daily, which will be continued.

 

Probable discharge tomorrow.

## 2020-01-28 VITALS — DIASTOLIC BLOOD PRESSURE: 86 MMHG | SYSTOLIC BLOOD PRESSURE: 157 MMHG

## 2020-01-28 LAB
BUN SERPL-MCNC: 3 MG/DL (ref 7–18)
CALCIUM SERPL-MCNC: 8.4 MG/DL (ref 8.5–10.1)
CHLORIDE SERPL-SCNC: 110 MEQ/L (ref 98–107)
CO2 SERPL-SCNC: 26 MEQ/L (ref 21–32)
CREAT SERPL-MCNC: 0.56 MG/DL (ref 0.7–1.3)
GFR SERPL CREATININE-BSD FRML MDRD: > 60 ML/MIN/{1.73_M2} (ref 56–?)
GLUCOSE SERPL-MCNC: 124 MG/DL (ref 70–100)
HCT VFR BLD AUTO: 34.3 % (ref 42–52)
HGB BLD-MCNC: 11.4 G/DL (ref 13.5–17.5)
MAGNESIUM SERPL-MCNC: 2.1 MG/DL (ref 1.8–2.4)
MCH RBC QN AUTO: 29.1 PG (ref 27–33)
MCHC RBC AUTO-ENTMCNC: 33.2 G/DL (ref 32–36.5)
MCV RBC AUTO: 87.5 FL (ref 80–96)
PLATELET # BLD AUTO: 229 10^3/UL (ref 150–450)
POTASSIUM SERPL-SCNC: 3.8 MEQ/L (ref 3.5–5.1)
RBC # BLD AUTO: 3.92 10^6/UL (ref 4.3–6.1)
SODIUM SERPL-SCNC: 140 MEQ/L (ref 136–145)
WBC # BLD AUTO: 5.1 10^3/UL (ref 4–10)

## 2020-01-28 RX ADMIN — INSULIN LISPRO SCH UNITS: 100 INJECTION, SOLUTION INTRAVENOUS; SUBCUTANEOUS at 07:53

## 2020-01-28 RX ADMIN — INSULIN LISPRO SCH UNITS: 100 INJECTION, SOLUTION INTRAVENOUS; SUBCUTANEOUS at 12:34

## 2020-01-28 RX ADMIN — ACETAMINOPHEN PRN MG: 325 TABLET ORAL at 11:06

## 2020-01-28 RX ADMIN — ASPIRIN SCH MG: 81 TABLET ORAL at 07:53

## 2020-01-28 RX ADMIN — ATORVASTATIN CALCIUM SCH MG: 20 TABLET, FILM COATED ORAL at 07:53

## 2020-01-28 RX ADMIN — SODIUM CHLORIDE SCH UNITS: 4.5 INJECTION, SOLUTION INTRAVENOUS at 05:51

## 2020-01-28 RX ADMIN — ACETAMINOPHEN PRN MG: 325 TABLET ORAL at 06:00

## 2020-01-28 RX ADMIN — ACETAMINOPHEN PRN MG: 325 TABLET ORAL at 00:13

## 2020-01-28 RX ADMIN — OCTREOTIDE ACETATE PRN LOZ: KIT at 00:13

## 2020-01-28 RX ADMIN — METOCLOPRAMIDE SCH MG: 5 TABLET ORAL at 12:34

## 2020-01-28 RX ADMIN — OCTREOTIDE ACETATE PRN LOZ: KIT at 04:07

## 2020-01-28 RX ADMIN — DULOXETINE HYDROCHLORIDE SCH MG: 30 CAPSULE, DELAYED RELEASE ORAL at 07:53

## 2020-01-28 RX ADMIN — METOCLOPRAMIDE SCH MG: 5 TABLET ORAL at 07:53

## 2020-01-28 RX ADMIN — OCTREOTIDE ACETATE PRN LOZ: KIT at 11:06

## 2020-01-28 RX ADMIN — OCTREOTIDE ACETATE PRN LOZ: KIT at 07:52

## 2020-01-28 RX ADMIN — OCTREOTIDE ACETATE PRN LOZ: KIT at 06:00

## 2020-01-28 NOTE — IPNPDOC
Subjective


Date Seen


The patient was seen on 1/28/20.





Subjective


Chief Complaint/HPI


Stable this morning, c/o sore throat. BG controlled, tolerating diet.





Objective


Physical Examination


General Exam:  Positive: Alert, No Acute Distress


Eye Exam:  Positive: PERRLA, Conjunctiva & lids normal, EOMI; 


   Negative: Sclera icteric


ENT Exam:  Positive: Atraumatic, Mucous membr. moist/pink, Pharynx Normal


Neck Exam:  Positive: Supple; 


   Negative: JVD, thyromegaly


Chest Exam:  Positive: Clear to auscultation, Normal air movement


Heart Exam:  Positive: Rate Normal, Regular Rhythm, Normal S1, Normal S2; 


   Negative: Murmurs, Rubs


Abdomen Exam:  Positive: Normal bowel sounds, Soft; 


   Negative: Tenderness, Hepatospenomegaly


Extremity Exam:  Positive: Normal pulses; 


   Negative: Clubbing, Cyanosis, Edema


Skin Exam:  Positive: Nl turgor and temperature; 


   Negative: Rash, Breakdown





Assessment /Plan


Assessment


# DKA


- resolved


- home today


- f/u with PCP


- has appt with endocrinologist next month


- resume home insulin





Plan/VTE


VTE Prophylaxis Ordered?:  Yes


VTE Exclusion Mechanical Proph:  N/A:VTE Prophy Ordered


VTE Exclusion Pharmacological:  N/A:VTE Prophy Ordered





VS, I&O, 24H, Fishbone


Vital Signs/I&O





Vital Signs








  Date Time  Temp Pulse Resp B/P (MAP) Pulse Ox O2 Delivery O2 Flow Rate FiO2


 


1/28/20 06:00 98.1 80 17 157/86 (109) 99 Room Air  














I&O- Last 24 Hours up to 6 AM 


 


 1/28/20





 06:00


 


Intake Total 1440 ml


 


Output Total 825 ml


 


Balance 615 ml











Laboratory Data


24H LABS


Laboratory Tests 2


1/27/20 11:25: Bedside Glucose (Misc Panel) 167H


1/27/20 17:22: Bedside Glucose (Misc Panel) 178H


1/27/20 20:08: Bedside Glucose (Misc Panel) 190H


1/28/20 06:52: 


Nucleated Red Blood Cells % (auto) 0.0, Anion Gap 4L, Glomerular Filtration Rate

> 60.0, Calcium Level 8.4L, Magnesium Level 2.1


CBC/BMP


Laboratory Tests


1/28/20 06:52








Microbiology





Microbiology


1/26/20 Blood Culture - Preliminary, Resulted


          No Growth after 48 hours. All Specime...


1/26/20 Blood Culture - Preliminary, Resulted


          No Growth after 48 hours. All Specime...











RIP HESTER MD             Jan 28, 2020 09:01

## 2020-07-21 ENCOUNTER — HOSPITAL ENCOUNTER (OUTPATIENT)
Dept: HOSPITAL 53 - M LAB | Age: 60
End: 2020-07-21
Attending: PHYSICIAN ASSISTANT
Payer: COMMERCIAL

## 2020-07-21 DIAGNOSIS — E10.65: Primary | ICD-10-CM

## 2020-07-21 LAB — EST. AVERAGE GLUCOSE BLD GHB EST-MCNC: 266 MG/DL (ref 60–110)

## 2020-08-23 ENCOUNTER — HOSPITAL ENCOUNTER (INPATIENT)
Dept: HOSPITAL 53 - M ED | Age: 60
LOS: 5 days | Discharge: HOME | DRG: 420 | End: 2020-08-28
Attending: INTERNAL MEDICINE | Admitting: INTERNAL MEDICINE
Payer: COMMERCIAL

## 2020-08-23 VITALS — DIASTOLIC BLOOD PRESSURE: 60 MMHG | SYSTOLIC BLOOD PRESSURE: 102 MMHG

## 2020-08-23 VITALS — WEIGHT: 166.89 LBS | HEIGHT: 75 IN | BODY MASS INDEX: 20.75 KG/M2

## 2020-08-23 DIAGNOSIS — Z88.6: ICD-10-CM

## 2020-08-23 DIAGNOSIS — E10.649: ICD-10-CM

## 2020-08-23 DIAGNOSIS — Z79.82: ICD-10-CM

## 2020-08-23 DIAGNOSIS — F32.9: ICD-10-CM

## 2020-08-23 DIAGNOSIS — M54.5: ICD-10-CM

## 2020-08-23 DIAGNOSIS — I10: ICD-10-CM

## 2020-08-23 DIAGNOSIS — Z79.4: ICD-10-CM

## 2020-08-23 DIAGNOSIS — E10.43: ICD-10-CM

## 2020-08-23 DIAGNOSIS — A04.72: ICD-10-CM

## 2020-08-23 DIAGNOSIS — E10.10: Primary | ICD-10-CM

## 2020-08-23 DIAGNOSIS — Z79.899: ICD-10-CM

## 2020-08-23 LAB
B-OH-BUTYR SERPL-MCNC: > 46 MG/DL (ref ?–2.81)
BASOPHILS # BLD AUTO: 0.1 10^3/UL (ref 0–0.2)
BASOPHILS NFR BLD AUTO: 0.2 % (ref 0–1)
BUN SERPL-MCNC: 34 MG/DL (ref 7–18)
CALCIUM SERPL-MCNC: 9.3 MG/DL (ref 8.5–10.1)
CHLORIDE SERPL-SCNC: 99 MEQ/L (ref 98–107)
CO2 SERPL-SCNC: 4 MEQ/L (ref 21–32)
CREAT SERPL-MCNC: 1.46 MG/DL (ref 0.7–1.3)
EOSINOPHIL # BLD AUTO: 0 10^3/UL (ref 0–0.5)
EOSINOPHIL NFR BLD AUTO: 0 % (ref 0–3)
GFR SERPL CREATININE-BSD FRML MDRD: 52.8 ML/MIN/{1.73_M2} (ref 56–?)
GLUCOSE SERPL-MCNC: 536 MG/DL (ref 70–100)
HCT VFR BLD AUTO: 40.2 % (ref 42–52)
HGB BLD-MCNC: 12.8 G/DL (ref 13.5–17.5)
LYMPHOCYTES # BLD AUTO: 1.5 10^3/UL (ref 1.5–5)
LYMPHOCYTES NFR BLD AUTO: 6.4 % (ref 24–44)
MCH RBC QN AUTO: 29.8 PG (ref 27–33)
MCHC RBC AUTO-ENTMCNC: 31.8 G/DL (ref 32–36.5)
MCV RBC AUTO: 93.5 FL (ref 80–96)
MONOCYTES # BLD AUTO: 0.4 10^3/UL (ref 0–0.8)
MONOCYTES NFR BLD AUTO: 1.6 % (ref 0–5)
NEUTROPHILS # BLD AUTO: 21.9 10^3/UL (ref 1.5–8.5)
NEUTROPHILS NFR BLD AUTO: 90.8 % (ref 36–66)
PLATELET # BLD AUTO: 406 10^3/UL (ref 150–450)
POTASSIUM SERPL-SCNC: 5.3 MEQ/L (ref 3.5–5.1)
RBC # BLD AUTO: 4.3 10^6/UL (ref 4.3–6.1)
SODIUM SERPL-SCNC: 129 MEQ/L (ref 136–145)
WBC # BLD AUTO: 24.1 10^3/UL (ref 4–10)

## 2020-08-23 RX ADMIN — INSULIN HUMAN SCH MLS/HR: 1 INJECTION, SOLUTION INTRAVENOUS at 21:24

## 2020-08-23 RX ADMIN — ACETAMINOPHEN PRN MG: 325 TABLET ORAL at 23:26

## 2020-08-23 RX ADMIN — Medication SCH: at 23:55

## 2020-08-23 NOTE — HPEPDOC
[facility] Medical History & Physical


Date of Admission


Aug 23, 2020


Date of Service:  Aug 23, 2020





History and Physical


CHIEF COMPLAINT: "sick to my stomach"





HISTORY OF PRESENT ILLNESS:


Patient is 60 year old male with Type I DM with hx recurrent admissions for DKA,

HTN, HLD, Depression and chronic back pain presented to the ER with complaints 

of feeling sick to his stomach for the past several days. Symptoms associated 

with nausea, vomiting, increased thirst and diarrhea. He does not report 

abdominal pain, chest pain, SOB, fever, chills or any other symptoms apart from 

what was mentioned. In ER, patient was found to have AG 26, bicarb 4 and BHB 46.

He very tachypneic and states that it is due to a combination of his back pain 

and maybe his DKA as well. He lives alone and has no sick contact. Does not 

report any urinary complaints. 





PAST MEDICAL HISTORY:


Refer to HPI





PAST SURGICAL HISTORY:


Laminectomy


b/l hernia repair





SOCIAL HISTORY:


Smoke 1/2 ppd. Social alcohol use. Denies drug use. 





FAMILY HISTORY:


Father- leukemia





ALLERGIES: Please see below.





REVIEW OF SYSTEMS:


10 point ROS negative except as above





HOME MEDICATIONS: Please see below. 





PHYSICAL EXAMINATION:


- General: Lying in bed with moderate discomfort. Labored breathing, severely 

dry skin and mucous membrane. 


- HEENT: Atraumatic, PERRLA. Dry mucous membrane. 


- CVS: Normal rate and rhythm. 


- Lungs: No appreciable wheezing / rales / rhonchi. tachypneic. 


- Abdomen: Soft, Non-distended, Non-tender


- Extremities: No extremity swelling, limbs intact 


- Skin: Warm and dry


- Neuro: No focal motor or sensory deficit





LABORATORY DATA: See below.





MICROBIOLOGY: Please see below. 





ASSESSMENT AND PLAN:


1. DKA 2/2 Type I DM


- Patient has been nauseated and reported stopped taking his long acting insulin

at some point as he was not eating. 


- AG 26, BHB >46, Bicarb 4,  on presentation. 


- Obtain Urine study and blood cultures. Leukocytosis WBC 26, reactive vs. 

infectious. No clear source of infection at this time, will hold off on Abx and 

monitor for fevers and repeat bloodwork. Low threshold to start empiric Abx.  


- EKG done in ER reported no ischemic changes. 


- Received 1 NS bolus in ER and started on insulin drip. Another bolus is 

ordered followed by high rate maintenance fluid with K supplementation. 


- Admit to ICU for insulin drip monitoring and adjustment. 


- Continue to trend BMP q4, monitor for electrolytes and AG. 


- NPO with ice chips. Once AG is closed, will transition to D5 1/2, SQ insulin 

and start patient on diet. 


2. HTN


- Hold PO meds for now. Monitor BP. 


3. HLD


- Hold Atorvastatin. 


4. Depression


- resume duloxetine once patient diet is restarted. 


5. Gastroparesis





DVT ppx: HSQ and SCD


Code status: Full code





Vital Signs





Vital Signs








  Date Time  Temp Pulse Resp B/P (MAP) Pulse Ox O2 Delivery O2 Flow Rate FiO2


 


8/23/20 19:25      Room Air  


 


8/23/20 18:49 97.8 91 22 132/62 (85) 100   











Laboratory Data


Labs 24H


Laboratory Tests 2


8/23/20 19:50: 


Immature Granulocyte % (Auto) 1.0, Neutrophils (%) (Auto) 90.8H, Lymphocytes (%)

(Auto) 6.4L, Monocytes (%) (Auto) 1.6, Eosinophils (%) (Auto) 0.0, Basophils (%)

(Auto) 0.2, Neutrophils # (Auto) 21.9H, Lymphocytes # (Auto) 1.5, Monocytes # 

(Auto) 0.4, Eosinophils # (Auto) 0.0, Basophils # (Auto) 0.1, Nucleated Red 

Blood Cells % (auto) 0.0, Anion Gap 26H, Glomerular Filtration Rate 52.8L, 

Calcium Level 9.3, B-Hydroxybutyrate > 46.00H


CBC/BMP


Laboratory Tests


8/23/20 19:50











Home Medications


Scheduled


Aspirin (Aspirin EC) 81 Mg Tab, 81 MG PO DAILY


Atorvastatin Calcium (Atorvastatin Calcium) 20 Mg Tablet, 20 MG PO DAILY


Duloxetine Hcl (Cymbalta) 60 Mg Cap, 60 MG PO DAILY


Duloxetine Hcl (Duloxetine HCl) 30 Mg Cap, 30 MG PO QPM


Insulin Glargine,Hum.rec.anlog (Basaglar Kwikpen U-100) 100 Unit/1 Ml In

suln.pen, 65 UNIT SC DAILY


Insulin Lispro (Admelog Solostar) 100 Unit/1 Ml Insuln.pen, 1 UNIT SC TID


   SLIDING SCALE -200 =8 UNITS    201-250 = 10 UNITS    251-300 =12 UNITS 


Lisinopril (Lisinopril) 5 Mg Tab, 5 MG PO DAILY


Zolpidem Tartrate (Ambien) 10 Mg Tab, 10 MG PO QHS





Scheduled PRN


Acetaminophen with Codeine (Acetaminophen-Cod #3 Tablet) 1 Tab Tab, 2 TAB PO q6-

8h PRN for PAIN





Allergies


Coded Allergies:  


     pregabalin (Verified  Allergy, Unknown, 8/23/20)





A-FIB/CHADSVASC


A-FIB History


Current/History of A-Fib/PAF?:  No











GUERLINE HER MD                Aug 23, 2020 21:37

## 2020-08-24 VITALS — SYSTOLIC BLOOD PRESSURE: 120 MMHG | DIASTOLIC BLOOD PRESSURE: 56 MMHG

## 2020-08-24 VITALS — DIASTOLIC BLOOD PRESSURE: 55 MMHG | SYSTOLIC BLOOD PRESSURE: 90 MMHG

## 2020-08-24 VITALS — DIASTOLIC BLOOD PRESSURE: 69 MMHG | SYSTOLIC BLOOD PRESSURE: 129 MMHG

## 2020-08-24 VITALS — SYSTOLIC BLOOD PRESSURE: 106 MMHG | DIASTOLIC BLOOD PRESSURE: 64 MMHG

## 2020-08-24 VITALS — SYSTOLIC BLOOD PRESSURE: 102 MMHG | DIASTOLIC BLOOD PRESSURE: 59 MMHG

## 2020-08-24 VITALS — DIASTOLIC BLOOD PRESSURE: 53 MMHG | SYSTOLIC BLOOD PRESSURE: 83 MMHG

## 2020-08-24 VITALS — DIASTOLIC BLOOD PRESSURE: 59 MMHG | SYSTOLIC BLOOD PRESSURE: 111 MMHG

## 2020-08-24 VITALS — DIASTOLIC BLOOD PRESSURE: 65 MMHG | SYSTOLIC BLOOD PRESSURE: 113 MMHG

## 2020-08-24 VITALS — DIASTOLIC BLOOD PRESSURE: 70 MMHG | SYSTOLIC BLOOD PRESSURE: 129 MMHG

## 2020-08-24 VITALS — SYSTOLIC BLOOD PRESSURE: 107 MMHG | DIASTOLIC BLOOD PRESSURE: 68 MMHG

## 2020-08-24 VITALS — DIASTOLIC BLOOD PRESSURE: 68 MMHG | SYSTOLIC BLOOD PRESSURE: 113 MMHG

## 2020-08-24 VITALS — DIASTOLIC BLOOD PRESSURE: 55 MMHG | SYSTOLIC BLOOD PRESSURE: 101 MMHG

## 2020-08-24 VITALS — SYSTOLIC BLOOD PRESSURE: 110 MMHG | DIASTOLIC BLOOD PRESSURE: 61 MMHG

## 2020-08-24 VITALS — DIASTOLIC BLOOD PRESSURE: 67 MMHG | SYSTOLIC BLOOD PRESSURE: 127 MMHG

## 2020-08-24 VITALS — DIASTOLIC BLOOD PRESSURE: 57 MMHG | SYSTOLIC BLOOD PRESSURE: 98 MMHG

## 2020-08-24 VITALS — DIASTOLIC BLOOD PRESSURE: 60 MMHG | SYSTOLIC BLOOD PRESSURE: 123 MMHG

## 2020-08-24 VITALS — SYSTOLIC BLOOD PRESSURE: 107 MMHG | DIASTOLIC BLOOD PRESSURE: 64 MMHG

## 2020-08-24 VITALS — SYSTOLIC BLOOD PRESSURE: 122 MMHG | DIASTOLIC BLOOD PRESSURE: 65 MMHG

## 2020-08-24 VITALS — SYSTOLIC BLOOD PRESSURE: 108 MMHG | DIASTOLIC BLOOD PRESSURE: 59 MMHG

## 2020-08-24 VITALS — SYSTOLIC BLOOD PRESSURE: 95 MMHG | DIASTOLIC BLOOD PRESSURE: 52 MMHG

## 2020-08-24 LAB
ALBUMIN SERPL BCG-MCNC: 3.5 GM/DL (ref 3.2–5.2)
ALT SERPL W P-5'-P-CCNC: 17 U/L (ref 12–78)
BASE EXCESS BLDA CALC-SCNC: -19.4 MMOL/L (ref -2–2)
BASE EXCESS BLDV CALC-SCNC: -14.9 MMOL/L (ref -2–2)
BILIRUB SERPL-MCNC: 0.4 MG/DL (ref 0.2–1)
BUN SERPL-MCNC: 17 MG/DL (ref 7–18)
BUN SERPL-MCNC: 25 MG/DL (ref 7–18)
BUN SERPL-MCNC: 28 MG/DL (ref 7–18)
BUN SERPL-MCNC: 32 MG/DL (ref 7–18)
BUN SERPL-MCNC: 39 MG/DL (ref 7–18)
CALCIUM SERPL-MCNC: 8.4 MG/DL (ref 8.5–10.1)
CALCIUM SERPL-MCNC: 8.4 MG/DL (ref 8.5–10.1)
CALCIUM SERPL-MCNC: 8.8 MG/DL (ref 8.5–10.1)
CALCIUM SERPL-MCNC: 9.1 MG/DL (ref 8.5–10.1)
CALCIUM SERPL-MCNC: 9.1 MG/DL (ref 8.5–10.1)
CHLORIDE SERPL-SCNC: 106 MEQ/L (ref 98–107)
CHLORIDE SERPL-SCNC: 109 MEQ/L (ref 98–107)
CHLORIDE SERPL-SCNC: 109 MEQ/L (ref 98–107)
CHLORIDE SERPL-SCNC: 110 MEQ/L (ref 98–107)
CHLORIDE SERPL-SCNC: 111 MEQ/L (ref 98–107)
CO2 BLDA CALC-SCNC: 7.4 MEQ/L (ref 22–29)
CO2 BLDV CALC-SCNC: 11.9 MEQ/L (ref 24–28)
CO2 SERPL-SCNC: 13 MEQ/L (ref 21–32)
CO2 SERPL-SCNC: 18 MEQ/L (ref 21–32)
CO2 SERPL-SCNC: 19 MEQ/L (ref 21–32)
CO2 SERPL-SCNC: 22 MEQ/L (ref 21–32)
CO2 SERPL-SCNC: 7 MEQ/L (ref 21–32)
CREAT SERPL-MCNC: 0.92 MG/DL (ref 0.7–1.3)
CREAT SERPL-MCNC: 0.97 MG/DL (ref 0.7–1.3)
CREAT SERPL-MCNC: 1.11 MG/DL (ref 0.7–1.3)
CREAT SERPL-MCNC: 1.17 MG/DL (ref 0.7–1.3)
CREAT SERPL-MCNC: 1.38 MG/DL (ref 0.7–1.3)
GFR SERPL CREATININE-BSD FRML MDRD: 56.3 ML/MIN/{1.73_M2} (ref 56–?)
GFR SERPL CREATININE-BSD FRML MDRD: > 60 ML/MIN/{1.73_M2} (ref 56–?)
GLUCOSE SERPL-MCNC: 108 MG/DL (ref 70–100)
GLUCOSE SERPL-MCNC: 112 MG/DL (ref 70–100)
GLUCOSE SERPL-MCNC: 252 MG/DL (ref 70–100)
GLUCOSE SERPL-MCNC: 264 MG/DL (ref 70–100)
GLUCOSE SERPL-MCNC: 318 MG/DL (ref 70–100)
HCO3 BLDA-SCNC: 6.9 MEQ/L (ref 22–26)
HCO3 BLDV-SCNC: 11.1 MEQ/L (ref 23–27)
HCO3 STD BLDA-SCNC: 10.2 MEQ/L (ref 22–26)
HCO3 STD BLDV-SCNC: 13 MEQ/L
HCT VFR BLD AUTO: 31.2 % (ref 42–52)
HCT VFR BLD AUTO: 39.3 % (ref 42–52)
HGB BLD-MCNC: 10.9 G/DL (ref 13.5–17.5)
HGB BLD-MCNC: 12.6 G/DL (ref 13.5–17.5)
MCH RBC QN AUTO: 29.1 PG (ref 27–33)
MCH RBC QN AUTO: 30 PG (ref 27–33)
MCHC RBC AUTO-ENTMCNC: 32.1 G/DL (ref 32–36.5)
MCHC RBC AUTO-ENTMCNC: 34.9 G/DL (ref 32–36.5)
MCV RBC AUTO: 86 FL (ref 80–96)
MCV RBC AUTO: 90.8 FL (ref 80–96)
PCO2 BLDA: 18.6 MMHG (ref 35–45)
PCO2 BLDV: 27.2 MMHG (ref 38–50)
PH BLDA: 7.18 UNITS (ref 7.35–7.45)
PH BLDV: 7.23 UNITS (ref 7.33–7.43)
PLATELET # BLD AUTO: 321 10^3/UL (ref 150–450)
PLATELET # BLD AUTO: 370 10^3/UL (ref 150–450)
PO2 BLDA: 162.8 MMHG (ref 75–100)
PO2 BLDV: 150.4 MMHG (ref 30–50)
POTASSIUM SERPL-SCNC: 3.7 MEQ/L (ref 3.5–5.1)
POTASSIUM SERPL-SCNC: 4 MEQ/L (ref 3.5–5.1)
POTASSIUM SERPL-SCNC: 4.1 MEQ/L (ref 3.5–5.1)
POTASSIUM SERPL-SCNC: 4.2 MEQ/L (ref 3.5–5.1)
POTASSIUM SERPL-SCNC: 4.6 MEQ/L (ref 3.5–5.1)
PROT SERPL-MCNC: 6.1 GM/DL (ref 6.4–8.2)
RBC # BLD AUTO: 3.63 10^6/UL (ref 4.3–6.1)
RBC # BLD AUTO: 4.33 10^6/UL (ref 4.3–6.1)
SAO2 % BLDA: 99.1 % (ref 95–99)
SAO2 % BLDV: 99 % (ref 60–80)
SODIUM SERPL-SCNC: 133 MEQ/L (ref 136–145)
SODIUM SERPL-SCNC: 134 MEQ/L (ref 136–145)
SODIUM SERPL-SCNC: 135 MEQ/L (ref 136–145)
SODIUM SERPL-SCNC: 135 MEQ/L (ref 136–145)
SODIUM SERPL-SCNC: 136 MEQ/L (ref 136–145)
WBC # BLD AUTO: 18.5 10^3/UL (ref 4–10)
WBC # BLD AUTO: 23 10^3/UL (ref 4–10)

## 2020-08-24 RX ADMIN — ACETAMINOPHEN PRN MG: 325 TABLET ORAL at 21:39

## 2020-08-24 RX ADMIN — POTASSIUM CHLORIDE, DEXTROSE MONOHYDRATE AND SODIUM CHLORIDE SCH MLS/HR: 150; 5; 450 INJECTION, SOLUTION INTRAVENOUS at 08:07

## 2020-08-24 RX ADMIN — Medication SCH: at 12:24

## 2020-08-24 RX ADMIN — DEXTROSE MONOHYDRATE SCH MLS/HR: 50 INJECTION, SOLUTION INTRAVENOUS at 18:22

## 2020-08-24 RX ADMIN — SODIUM CHLORIDE SCH UNITS: 4.5 INJECTION, SOLUTION INTRAVENOUS at 05:02

## 2020-08-24 RX ADMIN — Medication SCH: at 03:55

## 2020-08-24 RX ADMIN — DEXTROSE MONOHYDRATE SCH MLS/HR: 5 INJECTION INTRAVENOUS at 17:18

## 2020-08-24 RX ADMIN — INSULIN LISPRO SCH UNITS: 100 INJECTION, SOLUTION INTRAVENOUS; SUBCUTANEOUS at 23:39

## 2020-08-24 RX ADMIN — POTASSIUM CHLORIDE, DEXTROSE MONOHYDRATE AND SODIUM CHLORIDE SCH MLS/HR: 150; 5; 450 INJECTION, SOLUTION INTRAVENOUS at 02:15

## 2020-08-24 RX ADMIN — INSULIN HUMAN SCH MLS/HR: 1 INJECTION, SOLUTION INTRAVENOUS at 08:03

## 2020-08-24 RX ADMIN — ACETAMINOPHEN PRN MG: 325 TABLET ORAL at 08:25

## 2020-08-24 RX ADMIN — DULOXETINE HYDROCHLORIDE SCH MG: 30 CAPSULE, DELAYED RELEASE ORAL at 21:38

## 2020-08-24 RX ADMIN — SODIUM CHLORIDE SCH UNITS: 4.5 INJECTION, SOLUTION INTRAVENOUS at 13:25

## 2020-08-24 RX ADMIN — INSULIN LISPRO SCH UNITS: 100 INJECTION, SOLUTION INTRAVENOUS; SUBCUTANEOUS at 16:10

## 2020-08-24 RX ADMIN — DEXTROSE MONOHYDRATE SCH MLS/HR: 5 INJECTION INTRAVENOUS at 04:40

## 2020-08-24 RX ADMIN — Medication SCH: at 11:25

## 2020-08-24 RX ADMIN — DEXTROSE MONOHYDRATE SCH MLS/HR: 5 INJECTION INTRAVENOUS at 11:44

## 2020-08-24 RX ADMIN — Medication SCH: at 01:04

## 2020-08-24 RX ADMIN — Medication SCH: at 05:02

## 2020-08-24 RX ADMIN — POTASSIUM CHLORIDE, DEXTROSE MONOHYDRATE AND SODIUM CHLORIDE SCH MLS/HR: 150; 5; 450 INJECTION, SOLUTION INTRAVENOUS at 11:46

## 2020-08-24 RX ADMIN — POTASSIUM CHLORIDE, DEXTROSE MONOHYDRATE AND SODIUM CHLORIDE SCH MLS/HR: 150; 5; 450 INJECTION, SOLUTION INTRAVENOUS at 04:15

## 2020-08-24 RX ADMIN — Medication SCH: at 10:41

## 2020-08-24 RX ADMIN — DEXTROSE MONOHYDRATE SCH MLS/HR: 5 INJECTION INTRAVENOUS at 23:32

## 2020-08-24 RX ADMIN — PANTOPRAZOLE SODIUM SCH MG: 40 TABLET, DELAYED RELEASE ORAL at 10:37

## 2020-08-24 RX ADMIN — INSULIN LISPRO SCH UNITS: 100 INJECTION, SOLUTION INTRAVENOUS; SUBCUTANEOUS at 21:38

## 2020-08-24 RX ADMIN — SODIUM CHLORIDE SCH UNITS: 4.5 INJECTION, SOLUTION INTRAVENOUS at 21:39

## 2020-08-24 NOTE — IPNPDOC
Text Note


Date of Service


The patient was seen on 8/24/20.





NOTE


Patient was seen and examined. Doing better today. Complains of slight dizzin

ess. 





PHYSICAL EXAMINATION:


- General: Lying in bed with moderate discomfort. Labored breathing, severely 

dry skin and mucous membrane. 


- HEENT: Atraumatic, PERRLA. Dry mucous membrane. 


- CVS: Normal rate and rhythm. 


- Lungs: No appreciable wheezing / rales / rhonchi. tachypneic. 


- Abdomen: Soft, Non-distended, Non-tender


- Extremities: No extremity swelling, limbs intact 


- Skin: Warm and dry


- Neuro: No focal motor or sensory deficit





LABORATORY DATA: See below.





MICROBIOLOGY: Please see below. 





ASSESSMENT AND PLAN:





1. DKA 2/2 Type I DM: Resolved. Insulin Drip switced yo the sq Levemir 50 for 

bridging and then 30 bid along with SSI. This all is likely sec to non 

complaince Will dc q4 BMP and start q4 glucocheks. Spoke to the patient 

regarding the dietary and med complaince. ADA soft diet started 





2. HTN: Restart PO meds for now. Monitor BP. 





3. HLD: Atorvastatin. 





4. Depression: will resume duloxetin





5. Gastroparesis: will resume reglan 





DVT ppx: HSQ and SCD





Code status: Full code





VS,Fishbone, I+O


VS, Fishbone, I+O


Laboratory Tests


8/23/20 19:50








8/24/20 00:00








8/24/20 00:05








8/24/20 04:15








8/24/20 08:10








8/24/20 12:00











Vital Signs








  Date Time  Temp Pulse Resp B/P (MAP) Pulse Ox O2 Delivery O2 Flow Rate FiO2


 


8/24/20 13:00  80 18 127/67 (87) 100 Room Air  


 


8/24/20 12:00 98.2       














I&O- Last 24 Hours up to 6 AM 


 


 8/24/20





 06:00


 


Intake Total 2467 ml


 


Output Total 1200 ml


 


Balance 1267 ml

















NIKKI MOSES MD             Aug 24, 2020 14:55

## 2020-08-24 NOTE — PHACANCOPD
PHARMACY VANCOMYCIN DOSING


Pt Demographics


Demographics


Patient Age:60 , Weight:75.700  , Gender: male


Adjusted Body Weight


date: 8/24/20, Adjusted Body Weight: [75.7] Kg(actual wt)





Vancomycin


Vancomycin indication:  sepsis empirir tx


Vancomycin Target Ranges:  15-20 mcg/ml


Vancomycin Load Y/N:  Yes


Load Dose Date Time


Vancomycin Load Dose: 1.5 gm        Date:  8/24       Time:  6:00&7:00


Vancomycin Dose


Date: 8/24/20. Current Vancomycin Dose: [1 gm iv q12]


Intermittent Dosing?:  No





Labs


Micro





Microbiology


8/24/20 Blood Culture, Received


          Pending


8/23/20 Blood Culture, Received


          Pending


Creatinine Clearance


Date:8/24/20. Creatinine Clearance: [62.5].(calculated)





Assessment and Plan


Maintaining Current Dose?:  Yes


Reason for dose change:  No Dose Change





Pharmacist Note


Pharmacist Note


Date: 8/24/20. Pharmacist note:60 YOM,admitted for sepsis empiric tx 

.Ht:75",Wt:75.7kg,SCR=1.38,Calculated CRCL~62.5. Allergy:pregabalin.MRSA PCR 

ordered. TX includes Pip/Tazo 4.5 grams iv q6h and pharmacy managed Vancomycin. 

Vancomycin 1.5 gram loading dose ordered for this morning@06&07:00, then will 

begin a regimen of 1 gram IV [id number] this afternoon@1800. First trough is 

scheduled for 8/25@1700 (prior to the 4th dose)











JO[facility]S [facility]      Aug 24, 2020 05:20

## 2020-08-25 VITALS — SYSTOLIC BLOOD PRESSURE: 97 MMHG | DIASTOLIC BLOOD PRESSURE: 62 MMHG

## 2020-08-25 VITALS — SYSTOLIC BLOOD PRESSURE: 131 MMHG | DIASTOLIC BLOOD PRESSURE: 67 MMHG

## 2020-08-25 VITALS — SYSTOLIC BLOOD PRESSURE: 130 MMHG | DIASTOLIC BLOOD PRESSURE: 65 MMHG

## 2020-08-25 VITALS — DIASTOLIC BLOOD PRESSURE: 90 MMHG | SYSTOLIC BLOOD PRESSURE: 155 MMHG

## 2020-08-25 VITALS — DIASTOLIC BLOOD PRESSURE: 69 MMHG | SYSTOLIC BLOOD PRESSURE: 111 MMHG

## 2020-08-25 VITALS — SYSTOLIC BLOOD PRESSURE: 125 MMHG | DIASTOLIC BLOOD PRESSURE: 62 MMHG

## 2020-08-25 VITALS — SYSTOLIC BLOOD PRESSURE: 116 MMHG | DIASTOLIC BLOOD PRESSURE: 65 MMHG

## 2020-08-25 LAB
ALBUMIN SERPL BCG-MCNC: 3.1 GM/DL (ref 3.2–5.2)
ALT SERPL W P-5'-P-CCNC: 17 U/L (ref 12–78)
BASOPHILS # BLD AUTO: 0 10^3/UL (ref 0–0.2)
BASOPHILS NFR BLD AUTO: 0 % (ref 0–1)
BILIRUB SERPL-MCNC: 0.3 MG/DL (ref 0.2–1)
BUN SERPL-MCNC: 13 MG/DL (ref 7–18)
CALCIUM SERPL-MCNC: 8.4 MG/DL (ref 8.5–10.1)
CHLORIDE SERPL-SCNC: 110 MEQ/L (ref 98–107)
CO2 SERPL-SCNC: 21 MEQ/L (ref 21–32)
CREAT SERPL-MCNC: 0.7 MG/DL (ref 0.7–1.3)
EOSINOPHIL # BLD AUTO: 0.1 10^3/UL (ref 0–0.5)
EOSINOPHIL NFR BLD AUTO: 0.7 % (ref 0–3)
GFR SERPL CREATININE-BSD FRML MDRD: > 60 ML/MIN/{1.73_M2} (ref 56–?)
GLUCOSE SERPL-MCNC: 68 MG/DL (ref 70–100)
HCT VFR BLD AUTO: 31.1 % (ref 42–52)
HGB BLD-MCNC: 10.7 G/DL (ref 13.5–17.5)
LYMPHOCYTES # BLD AUTO: 1.5 10^3/UL (ref 1.5–5)
LYMPHOCYTES NFR BLD AUTO: 20.1 % (ref 24–44)
MCH RBC QN AUTO: 29.1 PG (ref 27–33)
MCHC RBC AUTO-ENTMCNC: 34.4 G/DL (ref 32–36.5)
MCV RBC AUTO: 84.5 FL (ref 80–96)
MONOCYTES # BLD AUTO: 0.6 10^3/UL (ref 0–0.8)
MONOCYTES NFR BLD AUTO: 8.1 % (ref 0–5)
NEUTROPHILS # BLD AUTO: 5.1 10^3/UL (ref 1.5–8.5)
NEUTROPHILS NFR BLD AUTO: 70.7 % (ref 36–66)
PLATELET # BLD AUTO: 263 10^3/UL (ref 150–450)
POTASSIUM SERPL-SCNC: 3.5 MEQ/L (ref 3.5–5.1)
PROT SERPL-MCNC: 5.5 GM/DL (ref 6.4–8.2)
RBC # BLD AUTO: 3.68 10^6/UL (ref 4.3–6.1)
SODIUM SERPL-SCNC: 138 MEQ/L (ref 136–145)
WBC # BLD AUTO: 7.3 10^3/UL (ref 4–10)

## 2020-08-25 RX ADMIN — SUCRALFATE SCH GM: 1 TABLET ORAL at 21:18

## 2020-08-25 RX ADMIN — SODIUM CHLORIDE SCH UNITS: 4.5 INJECTION, SOLUTION INTRAVENOUS at 22:49

## 2020-08-25 RX ADMIN — PANTOPRAZOLE SODIUM SCH MG: 40 TABLET, DELAYED RELEASE ORAL at 09:55

## 2020-08-25 RX ADMIN — DEXTROSE MONOHYDRATE SCH MLS/HR: 5 INJECTION INTRAVENOUS at 05:14

## 2020-08-25 RX ADMIN — METOCLOPRAMIDE SCH MG: 5 TABLET ORAL at 09:00

## 2020-08-25 RX ADMIN — SODIUM CHLORIDE SCH UNITS: 4.5 INJECTION, SOLUTION INTRAVENOUS at 06:33

## 2020-08-25 RX ADMIN — DULOXETINE HYDROCHLORIDE SCH MG: 30 CAPSULE, DELAYED RELEASE ORAL at 09:36

## 2020-08-25 RX ADMIN — METOCLOPRAMIDE SCH MG: 5 TABLET ORAL at 21:18

## 2020-08-25 RX ADMIN — DEXTROSE MONOHYDRATE SCH MLS/HR: 5 INJECTION INTRAVENOUS at 22:49

## 2020-08-25 RX ADMIN — INSULIN LISPRO SCH UNITS: 100 INJECTION, SOLUTION INTRAVENOUS; SUBCUTANEOUS at 09:37

## 2020-08-25 RX ADMIN — METOCLOPRAMIDE SCH MG: 5 TABLET ORAL at 13:53

## 2020-08-25 RX ADMIN — INSULIN DETEMIR SCH UNITS: 100 INJECTION, SOLUTION SUBCUTANEOUS at 21:19

## 2020-08-25 RX ADMIN — DULOXETINE HYDROCHLORIDE SCH MG: 30 CAPSULE, DELAYED RELEASE ORAL at 21:18

## 2020-08-25 RX ADMIN — DEXTROSE MONOHYDRATE SCH MLS/HR: 5 INJECTION INTRAVENOUS at 10:30

## 2020-08-25 RX ADMIN — INSULIN DETEMIR SCH UNITS: 100 INJECTION, SOLUTION SUBCUTANEOUS at 10:31

## 2020-08-25 RX ADMIN — DEXTROSE MONOHYDRATE SCH MLS/HR: 5 INJECTION INTRAVENOUS at 16:53

## 2020-08-25 RX ADMIN — INSULIN LISPRO SCH UNITS: 100 INJECTION, SOLUTION INTRAVENOUS; SUBCUTANEOUS at 12:00

## 2020-08-25 RX ADMIN — SODIUM CHLORIDE SCH UNITS: 4.5 INJECTION, SOLUTION INTRAVENOUS at 13:54

## 2020-08-25 RX ADMIN — INSULIN LISPRO SCH UNITS: 100 INJECTION, SOLUTION INTRAVENOUS; SUBCUTANEOUS at 05:14

## 2020-08-25 RX ADMIN — INSULIN LISPRO SCH UNITS: 100 INJECTION, SOLUTION INTRAVENOUS; SUBCUTANEOUS at 16:53

## 2020-08-25 RX ADMIN — METOCLOPRAMIDE SCH MG: 5 TABLET ORAL at 16:52

## 2020-08-25 RX ADMIN — SUCRALFATE SCH GM: 1 TABLET ORAL at 10:40

## 2020-08-25 RX ADMIN — DEXTROSE MONOHYDRATE SCH MG: 50 INJECTION, SOLUTION INTRAVENOUS at 21:18

## 2020-08-25 RX ADMIN — SUCRALFATE SCH GM: 1 TABLET ORAL at 16:52

## 2020-08-25 RX ADMIN — DEXTROSE MONOHYDRATE SCH MLS/HR: 50 INJECTION, SOLUTION INTRAVENOUS at 06:33

## 2020-08-25 RX ADMIN — INSULIN LISPRO SCH UNITS: 100 INJECTION, SOLUTION INTRAVENOUS; SUBCUTANEOUS at 21:00

## 2020-08-25 NOTE — IPNPDOC
Text Note


Date of Service


The patient was seen on 8/25/20.





NOTE


Patient was seen and examined. Doing better today. Complains of slight dizzin

ess. 





PHYSICAL EXAMINATION:


- General: Lying in bed with moderate discomfort. Labored breathing, severely 

dry skin and mucous membrane. 


- HEENT: Atraumatic, PERRLA. Dry mucous membrane. 


- CVS: Normal rate and rhythm. 


- Lungs: No appreciable wheezing / rales / rhonchi. tachypneic. 


- Abdomen: Soft, Non-distended, Non-tender


- Extremities: No extremity swelling, limbs intact 


- Skin: Warm and dry


- Neuro: No focal motor or sensory deficit





LABORATORY DATA: See below.





MICROBIOLOGY: Please see below. 





ASSESSMENT AND PLAN:





1. DKA 2/2 Type I DM: Resolved. Levemir 30 bid along with SSI. This all is 

likely sec to non complaince  ac hs glucocheks. Spoke to the patient regarding 

the dietary and med complaince. ADA soft diet started 





2. HTN: Restart PO meds . Monitor BP. 





3. HLD: Atorvastatin. 





4. Depression: will resume duloxetin





5. Gastroparesis: will resume reglan 





DVT ppx: HSQ and SCD





Code status: Full code





VS,Fishbone, I+O


VS, Fishbone, I+O


Laboratory Tests


8/24/20 21:20








8/25/20 05:04











Vital Signs








  Date Time  Temp Pulse Resp B/P (MAP) Pulse Ox O2 Delivery O2 Flow Rate FiO2


 


8/25/20 09:56    129/80    


 


8/25/20 08:00 98.5 82 20  100   


 


8/25/20 04:00      Room Air  














I&O- Last 24 Hours up to 6 AM 


 


 8/25/20





 06:00


 


Intake Total 2484 ml


 


Output Total 1350 ml


 


Balance 1134 ml

















NIKKI MOSES MD             Aug 25, 2020 13:53

## 2020-08-26 VITALS — DIASTOLIC BLOOD PRESSURE: 89 MMHG | SYSTOLIC BLOOD PRESSURE: 161 MMHG

## 2020-08-26 VITALS — DIASTOLIC BLOOD PRESSURE: 69 MMHG | SYSTOLIC BLOOD PRESSURE: 116 MMHG

## 2020-08-26 VITALS — DIASTOLIC BLOOD PRESSURE: 73 MMHG | SYSTOLIC BLOOD PRESSURE: 110 MMHG

## 2020-08-26 VITALS — DIASTOLIC BLOOD PRESSURE: 88 MMHG | SYSTOLIC BLOOD PRESSURE: 148 MMHG

## 2020-08-26 VITALS — SYSTOLIC BLOOD PRESSURE: 117 MMHG | DIASTOLIC BLOOD PRESSURE: 75 MMHG

## 2020-08-26 LAB
ALBUMIN SERPL BCG-MCNC: 3.5 GM/DL (ref 3.2–5.2)
ALT SERPL W P-5'-P-CCNC: 20 U/L (ref 12–78)
BILIRUB SERPL-MCNC: 0.5 MG/DL (ref 0.2–1)
BUN SERPL-MCNC: 5 MG/DL (ref 7–18)
C DIFF TOX GENS STL QL NAA+PROBE: POSITIVE
CALCIUM SERPL-MCNC: 8.8 MG/DL (ref 8.5–10.1)
CHLORIDE SERPL-SCNC: 106 MEQ/L (ref 98–107)
CO2 SERPL-SCNC: 28 MEQ/L (ref 21–32)
CREAT SERPL-MCNC: 0.69 MG/DL (ref 0.7–1.3)
EST. AVERAGE GLUCOSE BLD GHB EST-MCNC: 280 MG/DL (ref 60–110)
GFR SERPL CREATININE-BSD FRML MDRD: > 60 ML/MIN/{1.73_M2} (ref 56–?)
GLUCOSE SERPL-MCNC: 214 MG/DL (ref 70–100)
POTASSIUM SERPL-SCNC: 3.6 MEQ/L (ref 3.5–5.1)
PROT SERPL-MCNC: 6 GM/DL (ref 6.4–8.2)
SODIUM SERPL-SCNC: 138 MEQ/L (ref 136–145)

## 2020-08-26 RX ADMIN — DEXTROSE MONOHYDRATE SCH MLS/HR: 5 INJECTION INTRAVENOUS at 10:42

## 2020-08-26 RX ADMIN — INSULIN LISPRO SCH UNITS: 100 INJECTION, SOLUTION INTRAVENOUS; SUBCUTANEOUS at 20:16

## 2020-08-26 RX ADMIN — INSULIN LISPRO SCH UNITS: 100 INJECTION, SOLUTION INTRAVENOUS; SUBCUTANEOUS at 17:35

## 2020-08-26 RX ADMIN — SUCRALFATE SCH GM: 1 TABLET ORAL at 20:21

## 2020-08-26 RX ADMIN — ACETAMINOPHEN PRN MG: 325 TABLET ORAL at 17:36

## 2020-08-26 RX ADMIN — INSULIN DETEMIR SCH UNITS: 100 INJECTION, SOLUTION SUBCUTANEOUS at 20:21

## 2020-08-26 RX ADMIN — DULOXETINE HYDROCHLORIDE SCH MG: 30 CAPSULE, DELAYED RELEASE ORAL at 20:21

## 2020-08-26 RX ADMIN — INSULIN DETEMIR SCH UNITS: 100 INJECTION, SOLUTION SUBCUTANEOUS at 09:53

## 2020-08-26 RX ADMIN — VANCOMYCIN HYDROCHLORIDE SCH MG: KIT at 23:39

## 2020-08-26 RX ADMIN — SODIUM CHLORIDE SCH UNITS: 4.5 INJECTION, SOLUTION INTRAVENOUS at 20:20

## 2020-08-26 RX ADMIN — SODIUM CHLORIDE SCH UNITS: 4.5 INJECTION, SOLUTION INTRAVENOUS at 05:04

## 2020-08-26 RX ADMIN — DEXTROSE MONOHYDRATE SCH MLS/HR: 5 INJECTION INTRAVENOUS at 05:04

## 2020-08-26 RX ADMIN — VANCOMYCIN HYDROCHLORIDE SCH MG: KIT at 17:33

## 2020-08-26 RX ADMIN — INSULIN LISPRO SCH UNITS: 100 INJECTION, SOLUTION INTRAVENOUS; SUBCUTANEOUS at 13:42

## 2020-08-26 RX ADMIN — ACETAMINOPHEN PRN MG: 325 TABLET ORAL at 10:42

## 2020-08-26 RX ADMIN — DEXTROSE MONOHYDRATE SCH MLS/HR: 5 INJECTION INTRAVENOUS at 17:35

## 2020-08-26 RX ADMIN — ACETAMINOPHEN PRN MG: 325 TABLET ORAL at 00:22

## 2020-08-26 RX ADMIN — SODIUM CHLORIDE SCH UNITS: 4.5 INJECTION, SOLUTION INTRAVENOUS at 13:41

## 2020-08-26 RX ADMIN — INSULIN LISPRO SCH UNITS: 100 INJECTION, SOLUTION INTRAVENOUS; SUBCUTANEOUS at 07:30

## 2020-08-26 RX ADMIN — DEXTROSE MONOHYDRATE SCH MLS/HR: 5 INJECTION INTRAVENOUS at 22:18

## 2020-08-26 RX ADMIN — DULOXETINE HYDROCHLORIDE SCH MG: 30 CAPSULE, DELAYED RELEASE ORAL at 09:53

## 2020-08-26 RX ADMIN — SUCRALFATE SCH GM: 1 TABLET ORAL at 15:57

## 2020-08-26 RX ADMIN — DEXTROSE MONOHYDRATE SCH MG: 50 INJECTION, SOLUTION INTRAVENOUS at 20:20

## 2020-08-26 RX ADMIN — SUCRALFATE SCH GM: 1 TABLET ORAL at 09:53

## 2020-08-26 RX ADMIN — DEXTROSE MONOHYDRATE SCH MG: 50 INJECTION, SOLUTION INTRAVENOUS at 09:52

## 2020-08-26 NOTE — IPNPDOC
Text Note


Date of Service


The patient was seen on 8/26/20.





NOTE


Patient was seen and examined. Doing better today. Had an episode of 

hypoglycemia of 60





PHYSICAL EXAMINATION:


- General: Lying in bed with moderate discomfort. Labored breathing, severely 

dry skin and mucous membrane. 


- HEENT: Atraumatic, PERRLA. Dry mucous membrane. 


- CVS: Normal rate and rhythm. 


- Lungs: No appreciable wheezing / rales / rhonchi. tachypneic. 


- Abdomen: Soft, Non-distended, Non-tender


- Extremities: No extremity swelling, limbs intact 


- Skin: Warm and dry


- Neuro: No focal motor or sensory deficit





LABORATORY DATA: See below.





MICROBIOLOGY: Please see below. 





ASSESSMENT AND PLAN:





1. DKA 2/2 Type I DM: Resolved. Levemir decreased to 18 bid as pt became 

hypoglycemic and SSI continued. DKA is likely sec to non complaince  ac hs 

glucocheks. Spoke to the patient regarding the dietary and med complaince. ADA 

soft diet started 





2. HTN: Restart PO meds . Monitor BP. 





3. HLD: Atorvastatin. 





4. Depression: will resume duloxetin





5. Gastroparesis: will resume reglan 





DVT ppx: HSQ and SCD





Code status: Full code





VS,Fishbone, I+O


VS, Fishbone, I+O





Vital Signs








  Date Time  Temp Pulse Resp B/P (MAP) Pulse Ox O2 Delivery O2 Flow Rate FiO2


 


8/26/20 09:54    116/69    


 


8/26/20 04:00 98.7 87 14  97 Room Air  














I&O- Last 24 Hours up to 6 AM 


 


 8/26/20





 06:00


 


Intake Total 2153 ml


 


Output Total 2000 ml


 


Balance 153 ml

















NIKKI MOSES MD             Aug 26, 2020 10:29

## 2020-08-27 VITALS — DIASTOLIC BLOOD PRESSURE: 89 MMHG | SYSTOLIC BLOOD PRESSURE: 128 MMHG

## 2020-08-27 VITALS — SYSTOLIC BLOOD PRESSURE: 132 MMHG | DIASTOLIC BLOOD PRESSURE: 85 MMHG

## 2020-08-27 VITALS — DIASTOLIC BLOOD PRESSURE: 91 MMHG | SYSTOLIC BLOOD PRESSURE: 161 MMHG

## 2020-08-27 VITALS — SYSTOLIC BLOOD PRESSURE: 139 MMHG | DIASTOLIC BLOOD PRESSURE: 89 MMHG

## 2020-08-27 LAB
ALBUMIN SERPL BCG-MCNC: 3.1 GM/DL (ref 3.2–5.2)
ALT SERPL W P-5'-P-CCNC: 19 U/L (ref 12–78)
BILIRUB SERPL-MCNC: 0.6 MG/DL (ref 0.2–1)
BUN SERPL-MCNC: 6 MG/DL (ref 7–18)
CALCIUM SERPL-MCNC: 8.4 MG/DL (ref 8.5–10.1)
CHLORIDE SERPL-SCNC: 108 MEQ/L (ref 98–107)
CO2 SERPL-SCNC: 31 MEQ/L (ref 21–32)
CREAT SERPL-MCNC: 0.58 MG/DL (ref 0.7–1.3)
GFR SERPL CREATININE-BSD FRML MDRD: > 60 ML/MIN/{1.73_M2} (ref 56–?)
GLUCOSE SERPL-MCNC: 103 MG/DL (ref 70–100)
HCT VFR BLD AUTO: 31.5 % (ref 42–52)
HGB BLD-MCNC: 10.8 G/DL (ref 13.5–17.5)
MCH RBC QN AUTO: 29.2 PG (ref 27–33)
MCHC RBC AUTO-ENTMCNC: 34.3 G/DL (ref 32–36.5)
MCV RBC AUTO: 85.1 FL (ref 80–96)
PLATELET # BLD AUTO: 216 10^3/UL (ref 150–450)
POTASSIUM SERPL-SCNC: 3.6 MEQ/L (ref 3.5–5.1)
PROT SERPL-MCNC: 5.5 GM/DL (ref 6.4–8.2)
RBC # BLD AUTO: 3.7 10^6/UL (ref 4.3–6.1)
SODIUM SERPL-SCNC: 142 MEQ/L (ref 136–145)
WBC # BLD AUTO: 3.7 10^3/UL (ref 4–10)

## 2020-08-27 RX ADMIN — INSULIN LISPRO SCH UNITS: 100 INJECTION, SOLUTION INTRAVENOUS; SUBCUTANEOUS at 21:00

## 2020-08-27 RX ADMIN — INSULIN LISPRO SCH UNITS: 100 INJECTION, SOLUTION INTRAVENOUS; SUBCUTANEOUS at 07:57

## 2020-08-27 RX ADMIN — SUCRALFATE SCH GM: 1 TABLET ORAL at 07:58

## 2020-08-27 RX ADMIN — DEXTROSE MONOHYDRATE SCH MLS/HR: 5 INJECTION INTRAVENOUS at 05:31

## 2020-08-27 RX ADMIN — DULOXETINE HYDROCHLORIDE SCH MG: 30 CAPSULE, DELAYED RELEASE ORAL at 21:02

## 2020-08-27 RX ADMIN — INSULIN LISPRO SCH UNITS: 100 INJECTION, SOLUTION INTRAVENOUS; SUBCUTANEOUS at 12:36

## 2020-08-27 RX ADMIN — INSULIN DETEMIR SCH UNITS: 100 INJECTION, SOLUTION SUBCUTANEOUS at 21:06

## 2020-08-27 RX ADMIN — INSULIN DETEMIR SCH UNITS: 100 INJECTION, SOLUTION SUBCUTANEOUS at 07:57

## 2020-08-27 RX ADMIN — SODIUM CHLORIDE SCH UNITS: 4.5 INJECTION, SOLUTION INTRAVENOUS at 21:04

## 2020-08-27 RX ADMIN — SODIUM CHLORIDE SCH UNITS: 4.5 INJECTION, SOLUTION INTRAVENOUS at 12:37

## 2020-08-27 RX ADMIN — INSULIN LISPRO SCH UNITS: 100 INJECTION, SOLUTION INTRAVENOUS; SUBCUTANEOUS at 16:54

## 2020-08-27 RX ADMIN — VANCOMYCIN HYDROCHLORIDE SCH MG: KIT at 23:32

## 2020-08-27 RX ADMIN — DULOXETINE HYDROCHLORIDE SCH MG: 30 CAPSULE, DELAYED RELEASE ORAL at 07:57

## 2020-08-27 RX ADMIN — DEXTROSE MONOHYDRATE SCH MG: 50 INJECTION, SOLUTION INTRAVENOUS at 21:04

## 2020-08-27 RX ADMIN — DEXTROSE MONOHYDRATE SCH MG: 50 INJECTION, SOLUTION INTRAVENOUS at 07:57

## 2020-08-27 RX ADMIN — VANCOMYCIN HYDROCHLORIDE SCH MG: KIT at 05:31

## 2020-08-27 RX ADMIN — VANCOMYCIN HYDROCHLORIDE SCH MG: KIT at 16:55

## 2020-08-27 RX ADMIN — VANCOMYCIN HYDROCHLORIDE SCH MG: KIT at 12:37

## 2020-08-27 RX ADMIN — SODIUM CHLORIDE SCH UNITS: 4.5 INJECTION, SOLUTION INTRAVENOUS at 05:31

## 2020-08-27 RX ADMIN — SUCRALFATE SCH GM: 1 TABLET ORAL at 21:02

## 2020-08-27 RX ADMIN — SUCRALFATE SCH GM: 1 TABLET ORAL at 16:54

## 2020-08-27 NOTE — IPNPDOC
Text Note


Date of Service


The patient was seen on 8/27/20.





NOTE


Patient was seen and examined. Doing better today. But has 5 episodes of diar

lisandra over night.





PHYSICAL EXAMINATION:


- General: Lying in bed with moderate discomfort. Labored breathing, severely 

dry skin and mucous membrane. 


- HEENT: Atraumatic, PERRLA. Dry mucous membrane. 


- CVS: Normal rate and rhythm. 


- Lungs: No appreciable wheezing / rales / rhonchi. tachypneic. 


- Abdomen: Soft, Non-distended, Non-tender


- Extremities: No extremity swelling, limbs intact 


- Skin: Warm and dry


- Neuro: No focal motor or sensory deficit





LABORATORY DATA: See below.





MICROBIOLOGY: Please see below. 





ASSESSMENT AND PLAN:





1. DKA 2/2 Type I DM: Resolved. Levemir 18 bid  and SSI continued. DKA is likely

sec to non complaince  ac hs glucocheks. Spoke to the patient regarding the d

ietary and med complaince. ADA soft diet 





2. Diarrhea : Cdiff positive : PO vanco Started 125 PO q6





3. HLD: Atorvastatin. 





4. Depression: duloxetin





5. Gastroparesis: Reglan on hold as diarrhea 





DVT ppx: HSQ and SCD





Code status: Full code





VS,Fishbone, I+O


VS, Fishbone, I+O


Laboratory Tests


8/26/20 13:40








8/27/20 05:30











Vital Signs








  Date Time  Temp Pulse Resp B/P (MAP) Pulse Ox O2 Delivery O2 Flow Rate FiO2


 


8/27/20 07:58    139/89    


 


8/27/20 06:00 98.7 99 20  99 Room Air  














I&O- Last 24 Hours up to 6 AM 


 


 8/27/20





 06:00


 


Intake Total 880 ml


 


Output Total 2200 ml


 


Balance -1320 ml

















NIKKI MOSES MD             Aug 27, 2020 11:02

## 2020-08-28 VITALS — SYSTOLIC BLOOD PRESSURE: 169 MMHG | DIASTOLIC BLOOD PRESSURE: 89 MMHG

## 2020-08-28 VITALS — DIASTOLIC BLOOD PRESSURE: 89 MMHG | SYSTOLIC BLOOD PRESSURE: 169 MMHG

## 2020-08-28 LAB
ALBUMIN SERPL BCG-MCNC: 3.5 GM/DL (ref 3.2–5.2)
ALT SERPL W P-5'-P-CCNC: 39 U/L (ref 12–78)
BILIRUB SERPL-MCNC: 0.3 MG/DL (ref 0.2–1)
BUN SERPL-MCNC: 7 MG/DL (ref 7–18)
CALCIUM SERPL-MCNC: 9 MG/DL (ref 8.5–10.1)
CHLORIDE SERPL-SCNC: 106 MEQ/L (ref 98–107)
CO2 SERPL-SCNC: 31 MEQ/L (ref 21–32)
CREAT SERPL-MCNC: 0.56 MG/DL (ref 0.7–1.3)
GFR SERPL CREATININE-BSD FRML MDRD: > 60 ML/MIN/{1.73_M2} (ref 56–?)
GLUCOSE SERPL-MCNC: 78 MG/DL (ref 70–100)
HCT VFR BLD AUTO: 32.3 % (ref 42–52)
HGB BLD-MCNC: 10.9 G/DL (ref 13.5–17.5)
MCH RBC QN AUTO: 29.1 PG (ref 27–33)
MCHC RBC AUTO-ENTMCNC: 33.7 G/DL (ref 32–36.5)
MCV RBC AUTO: 86.4 FL (ref 80–96)
PLATELET # BLD AUTO: 271 10^3/UL (ref 150–450)
POTASSIUM SERPL-SCNC: 3.4 MEQ/L (ref 3.5–5.1)
PROT SERPL-MCNC: 5.9 GM/DL (ref 6.4–8.2)
RBC # BLD AUTO: 3.74 10^6/UL (ref 4.3–6.1)
SODIUM SERPL-SCNC: 143 MEQ/L (ref 136–145)
WBC # BLD AUTO: 3.7 10^3/UL (ref 4–10)

## 2020-08-28 RX ADMIN — SODIUM CHLORIDE SCH UNITS: 4.5 INJECTION, SOLUTION INTRAVENOUS at 05:40

## 2020-08-28 RX ADMIN — SUCRALFATE SCH GM: 1 TABLET ORAL at 08:45

## 2020-08-28 RX ADMIN — VANCOMYCIN HYDROCHLORIDE SCH MG: KIT at 11:31

## 2020-08-28 RX ADMIN — DEXTROSE MONOHYDRATE SCH MG: 50 INJECTION, SOLUTION INTRAVENOUS at 08:44

## 2020-08-28 RX ADMIN — INSULIN LISPRO SCH UNITS: 100 INJECTION, SOLUTION INTRAVENOUS; SUBCUTANEOUS at 07:30

## 2020-08-28 RX ADMIN — INSULIN LISPRO SCH UNITS: 100 INJECTION, SOLUTION INTRAVENOUS; SUBCUTANEOUS at 11:31

## 2020-08-28 RX ADMIN — INSULIN DETEMIR SCH UNITS: 100 INJECTION, SOLUTION SUBCUTANEOUS at 08:44

## 2020-08-28 RX ADMIN — DULOXETINE HYDROCHLORIDE SCH MG: 30 CAPSULE, DELAYED RELEASE ORAL at 08:44

## 2020-08-28 RX ADMIN — VANCOMYCIN HYDROCHLORIDE SCH MG: KIT at 05:40

## 2020-08-28 NOTE — DS.PDOC
Discharge Summary


General


Date of Admission


Aug 23, 2020 at 21:34


Date of Discharge


08/28/20





Discharge Summary


CHIEF COMPLAINT: "sick to my stomach"





Final Diagnosis:


DKA


C-diff colitis





HISTORY OF PRESENT ILLNESS:


Patient is 60 year old male with Type I DM with hx recurrent admissions for DKA,

HTN, HLD, Depression and chronic back pain presented to the ER with complaints 

of feeling sick to his stomach for the past several days. Symptoms associated 

with nausea, vomiting, increased thirst and diarrhea. He does not report 

abdominal pain, chest pain, SOB, fever, chills or any other symptoms apart from 

what was mentioned. In ER, patient was found to have AG 26, bicarb 4  He very 

tachypneic and states that it is due to a combination of his back pain and maybe

his DKA as well. He lives alone and has no sick contact. Does not report any 

urinary complaints. DKA protocol was started and as the AG closed , diet and sq 

insulin with 25 bid of levemer was started. He is non compliant to diet and 

medication and has an A1c of 11.4. Counselled in detail for diet and medication 

complaince. Also he started with abd pain and had 3 episodes of diarrhea, and as

he got iv vanc and zosyn on the day of admission as his WBC count was elevated 

and we were trying to r/o infectious causes leading to DKA. He came out positive

for Cdiff and was given vanco. On Dc as po vanco is not covered by his insurance

, po flagyl 500 tib for 12 more days has been prescribed. He will be started 

nback on same diose of basiglar and SSI premeal and has been advised to f/u with

PCP in 1 week iwth the b/s log for insulin adjustment . He is medically stable 

for DC





PHYSICAL EXAMINATION:


- General: Lying in bed with moderate discomfort. Labored breathing, severely 

dry skin and mucous membrane. 


- HEENT: Atraumatic, PERRLA. Dry mucous membrane. 


- CVS: Normal rate and rhythm. 


- Lungs: No appreciable wheezing / rales / rhonchi. tachypneic. 


- Abdomen: Soft, Non-distended, Non-tender


- Extremities: No extremity swelling, limbs intact 


- Skin: Warm and dry


- Neuro: No focal motor or sensory deficit





LABORATORY DATA: See below.





MICROBIOLOGY: Please see below. 





Medications on DC: As per reconcilation lst. All home meds continued and flagyl 

started. 





F/U appiontments : F/u with PCP in 1 week 





Diet: ADA 





Condition on discharge : Medically optimized for DC 





Discharge Disposition : Home





Total time spend on this discharge including coordination of care, review of 

chart , documentation and actual patient contact is 35 minutes.





Vital Signs/I&Os





Vital Signs








  Date Time  Temp Pulse Resp B/P (MAP) Pulse Ox O2 Delivery O2 Flow Rate FiO2


 


8/28/20 08:45    169/89    


 


8/28/20 06:00 99.0 88 18  97 Room Air  














I&O- Last 24 Hours up to 6 AM 


 


 8/28/20





 06:00


 


Intake Total 2330 ml


 


Output Total 2650 ml


 


Balance -320 ml











Laboratory Data


Labs 24H


Laboratory Tests 2


8/28/20 07:58: 


Nucleated Red Blood Cells % (auto) 0.0, Anion Gap 6L, Glomerular Filtration Rate

> 60.0, Calcium Level 9.0, Total Bilirubin 0.3, Aspartate Amino Transf 

(AST/SGOT) 42H, Alanine Aminotransferase (ALT/SGPT) 39, Alkaline Phosphatase 73,

Total Protein 5.9L, Albumin 3.5, Albumin/Globulin Ratio 1.5


CBC/BMP


Laboratory Tests


8/28/20 07:58











Microbiology





Microbiology


8/26/20 Stool Occult Blood (BARRY) - Final, Complete


          


8/24/20 Blood Culture - Preliminary, Resulted


          No Growth after 72 hours. All specime...


8/23/20 Blood Culture - Preliminary, Resulted


          No Growth after 48 hours. All Specime...





Discharge Medications


Scheduled


Aspirin (Aspirin EC) 81 Mg Tab, 81 MG PO DAILY, (Reported)


Atorvastatin Calcium (Atorvastatin Calcium) 20 Mg Tablet, 20 MG PO DAILY, 

(Reported)


Duloxetine Hcl (Cymbalta) 60 Mg Cap, 60 MG PO DAILY, (Reported)


Duloxetine Hcl (Duloxetine HCl) 30 Mg Cap, 30 MG PO QPM, (Reported)


Insulin Glargine,Hum.rec.anlog (Basaglar Kwikpen U-100) 100 Unit/1 Ml 

Insuln.pen, 65 UNIT SC DAILY, (Reported)


Insulin Lispro (Admelog Solostar) 100 Unit/1 Ml Insuln.pen, 1 UNIT SC TID, 

(Reported)


   SLIDING SCALE -200 =8 UNITS    201-250 = 10 UNITS    251-300 =12 UNITS 


Lisinopril (Lisinopril) 5 Mg Tab, 5 MG PO DAILY, (Reported)


Vancomycin HCl (Firvanq) 50 Mg/1 Ml Soln.recon, 125 MG PO Q6H


Zolpidem Tartrate (Ambien) 10 Mg Tab, 10 MG PO QHS, (Reported)





Scheduled PRN


Acetaminophen with Codeine (Acetaminophen-Cod #3 Tablet) 1 Tab Tab, 2 TAB PO q6-

8h PRN for PAIN, (Reported)





Allergies


Coded Allergies:  


     pregabalin (Verified  Allergy, Unknown, 8/23/20)











NIKKI MOSES MD             Aug 28, 2020 11:36

## 2020-08-30 ENCOUNTER — HOSPITAL ENCOUNTER (EMERGENCY)
Dept: HOSPITAL 53 - M ED | Age: 60
Discharge: HOME | End: 2020-08-30
Payer: COMMERCIAL

## 2020-08-30 VITALS — DIASTOLIC BLOOD PRESSURE: 70 MMHG | SYSTOLIC BLOOD PRESSURE: 121 MMHG

## 2020-08-30 VITALS — HEIGHT: 75 IN | WEIGHT: 183.42 LBS | BODY MASS INDEX: 22.81 KG/M2

## 2020-08-30 DIAGNOSIS — E11.9: ICD-10-CM

## 2020-08-30 DIAGNOSIS — F17.200: ICD-10-CM

## 2020-08-30 DIAGNOSIS — Z79.899: ICD-10-CM

## 2020-08-30 DIAGNOSIS — Z79.4: ICD-10-CM

## 2020-08-30 DIAGNOSIS — R22.43: Primary | ICD-10-CM

## 2020-08-30 DIAGNOSIS — Z88.8: ICD-10-CM

## 2020-08-30 LAB
ALBUMIN SERPL BCG-MCNC: 3.7 GM/DL (ref 3.2–5.2)
ALT SERPL W P-5'-P-CCNC: 101 U/L (ref 12–78)
BASOPHILS # BLD AUTO: 0 10^3/UL (ref 0–0.2)
BASOPHILS NFR BLD AUTO: 0.4 % (ref 0–1)
BILIRUB CONJ SERPL-MCNC: 0.1 MG/DL (ref 0–0.2)
BILIRUB SERPL-MCNC: 0.3 MG/DL (ref 0.2–1)
BUN SERPL-MCNC: 8 MG/DL (ref 7–18)
CALCIUM SERPL-MCNC: 9.2 MG/DL (ref 8.5–10.1)
CHLORIDE SERPL-SCNC: 103 MEQ/L (ref 98–107)
CO2 SERPL-SCNC: 30 MEQ/L (ref 21–32)
CREAT SERPL-MCNC: 0.7 MG/DL (ref 0.7–1.3)
EOSINOPHIL # BLD AUTO: 0.2 10^3/UL (ref 0–0.5)
EOSINOPHIL NFR BLD AUTO: 4.8 % (ref 0–3)
GFR SERPL CREATININE-BSD FRML MDRD: > 60 ML/MIN/{1.73_M2} (ref 56–?)
GLUCOSE SERPL-MCNC: 260 MG/DL (ref 70–100)
HCT VFR BLD AUTO: 31 % (ref 42–52)
HGB BLD-MCNC: 10.5 G/DL (ref 13.5–17.5)
LYMPHOCYTES # BLD AUTO: 2 10^3/UL (ref 1.5–5)
LYMPHOCYTES NFR BLD AUTO: 39.6 % (ref 24–44)
MCH RBC QN AUTO: 29.6 PG (ref 27–33)
MCHC RBC AUTO-ENTMCNC: 33.9 G/DL (ref 32–36.5)
MCV RBC AUTO: 87.3 FL (ref 80–96)
MONOCYTES # BLD AUTO: 0.6 10^3/UL (ref 0–0.8)
MONOCYTES NFR BLD AUTO: 12.1 % (ref 0–5)
NEUTROPHILS # BLD AUTO: 2.2 10^3/UL (ref 1.5–8.5)
NEUTROPHILS NFR BLD AUTO: 42.9 % (ref 36–66)
NT-PRO BNP: 220 PG/ML (ref ?–125)
PLATELET # BLD AUTO: 348 10^3/UL (ref 150–450)
POTASSIUM SERPL-SCNC: 3.8 MEQ/L (ref 3.5–5.1)
PROT SERPL-MCNC: 6.2 GM/DL (ref 6.4–8.2)
RBC # BLD AUTO: 3.55 10^6/UL (ref 4.3–6.1)
SODIUM SERPL-SCNC: 139 MEQ/L (ref 136–145)
WBC # BLD AUTO: 5.1 10^3/UL (ref 4–10)

## 2020-08-30 NOTE — REPVR
PROCEDURE INFORMATION: 

Exam: US Duplex Lower Extremity Veins, Bilateral 

Exam date and time: 8/30/2020 9:03 PM 

Age: 60 years old 

Clinical indication: Edema, localized; Lower extremity, bilateral; Additional 

info: Leg swelling 



TECHNIQUE: 

Imaging protocol: Real-time duplex ultrasound of the extremities with 2-D gray 

scale, color Doppler flow and spectral waveform analysis with image 

documentation. Complete exam focused on the bilateral lower extremity veins. 



COMPARISON: 

No relevant prior studies available. 



FINDINGS: 

Right deep veins: Unremarkable. The common femoral, femoral, and popliteal 

veins are patent without thrombus. Normal compressibility, augmentation 

response and Doppler waveforms. 

Right superficial veins: Saphenofemoral junction is patent without thrombus. 



Left deep veins: Unremarkable. The common femoral, femoral, and popliteal veins 

are patent without thrombus. Normal compressibility, augmentation response and 

Doppler waveforms. 

Left superficial veins: Saphenofemoral junction is patent without thrombus. 



Soft tissues: Unremarkable. 



IMPRESSION: 

No deep vein thrombosis in the veins imaged in both lower extremities. 



Electronically signed by: Yvan Taylor On 08/30/2020  21:22:40 PM

## 2020-09-09 NOTE — ECGEPIP
[facility] - ED

                                       

                                       Test Date:    2020

Pat Name:     FRANCISCO BLANCA             Department:   

Patient ID:   [id number]                 Room:         [place] [address]

Gender:       Male                     Technician:   PORTER

:          1960               Requested By: BAL COLEMAN 

Order Number: [id number]     Reading MD:   Kamlesh Aranda

                                 Measurements

Intervals                              Axis          

Rate:         87                       P:            73

MO:           148                      QRS:          64

QRSD:         102                      T:            66

QT:           362                                    

QTc:          438                                    

                           Interpretive Statements

SINUS RHYTHM

NONSPECIFIC ST & T-WAVE ABNORMALITY

BORDERLINE ECG

SEE SCANNED DOWNTIME REPORT

## 2020-09-22 NOTE — REP
PORTABLE CHEST X-RAY: SINGLE VIEW



HISTORY: Leukocytosis.



COMPARISON: Chest x-ray 01/25/2020. 



FINDINGS: 

Monitoring electrodes are seen. The lungs are well-inflated and clear. Pleural 
angles are sharp. Heart is not enlarged. There is a skin fold overlying the 
right upper lateral chest. Pulmonary vasculature is not increased. No bony 
abnormality is seen.



IMPRESSION: 

No active disease. No infiltrate seen. 

MTDD

## 2020-09-25 NOTE — REP
PORTABLE CHEST X-RAY 



CLINICAL: Cough and dyspnea.



COMPARISON: 08/23/2020. 



FINDINGS: 

Mediastinum and cardiac silhouette are normal/stable. Lung fields are clear. No 
focal consolidation, effusion, pneumothorax. Skeletal structures are intact.   



IMPRESSION: 

Stable chest x-ray. No acute cardiopulmonary process.   

MTDD

## 2020-10-05 ENCOUNTER — HOSPITAL ENCOUNTER (EMERGENCY)
Dept: HOSPITAL 53 - M ED | Age: 60
Discharge: LEFT BEFORE DISCHARGE | End: 2020-10-05
Payer: COMMERCIAL

## 2020-10-05 VITALS — WEIGHT: 176.59 LBS | HEIGHT: 75 IN | BODY MASS INDEX: 21.96 KG/M2

## 2020-10-05 VITALS — DIASTOLIC BLOOD PRESSURE: 83 MMHG | SYSTOLIC BLOOD PRESSURE: 143 MMHG

## 2020-10-05 DIAGNOSIS — Z53.21: Primary | ICD-10-CM

## 2020-10-19 ENCOUNTER — HOSPITAL ENCOUNTER (OUTPATIENT)
Dept: HOSPITAL 53 - M RAD | Age: 60
End: 2020-10-19
Attending: PHYSICIAN ASSISTANT
Payer: COMMERCIAL

## 2020-10-19 DIAGNOSIS — R10.9: Primary | ICD-10-CM

## 2020-10-19 NOTE — REP
INDICATION:

ABD PAIN



COMPARISON:

None



TECHNIQUE:

Real time B-mode gray scale ultrasound examination using curved array transducer.



FINDINGS:

Liver, spleen, and visualized portions of the pancreas are normal in echotexture, size

and appearance.  No focal hepatic, pancreatic or splenic lesions are identified.



Gallbladder is normal without gallstones, wall thickening, or pericholecystic fluid.

There is mild dilatation to the common bile duct at 8.5 mm diameter without obvious

obstructing cause.



Bilateral kidneys are normal in reniform shape and without hydronephrosis but

demonstrate increased central sinus fat and renovascular calcifications suggesting age

related changes.  Right kidney measures 12.7 x 6.9 x 6.5 cm with a vague 5 cm ovoid

area of subtle increased parenchymal echotexture but without irregular vascularity or

further obvious abnormality seen to suggest definite mass.  Left kidney measures 12.6

x 4.4 x 5.6 cm.



Abdominal aorta measures 3.3 cm maximal diameter and demonstrates atherosclerotic

changes throughout the visualized course.



IMPRESSION:

1. Mildly dilated common bile duct without obvious cholelithiasis or obstructing cause.

2. Findings to suggest age-related renal changes and possible ill-defined area within

the right kidney.  While this may be normal parenchyma, consider pre and postcontrast

CT of the abdomen for further investigation.

3. Atherosclerotic disease to the aorta.



<Electronically signed by Wade Angel > 10/19/20 [id number]

## 2020-10-26 ENCOUNTER — HOSPITAL ENCOUNTER (OUTPATIENT)
Dept: HOSPITAL 53 - M INFU | Age: 60
Discharge: HOME | End: 2020-10-26
Attending: INTERNAL MEDICINE
Payer: COMMERCIAL

## 2020-10-26 VITALS — WEIGHT: 180.36 LBS | BODY MASS INDEX: 24.43 KG/M2 | HEIGHT: 72 IN

## 2020-10-26 VITALS — DIASTOLIC BLOOD PRESSURE: 83 MMHG | SYSTOLIC BLOOD PRESSURE: 142 MMHG

## 2020-10-26 VITALS — DIASTOLIC BLOOD PRESSURE: 65 MMHG | SYSTOLIC BLOOD PRESSURE: 107 MMHG

## 2020-10-26 DIAGNOSIS — A04.71: Primary | ICD-10-CM

## 2020-10-26 PROCEDURE — 96366 THER/PROPH/DIAG IV INF ADDON: CPT

## 2020-10-26 PROCEDURE — 96365 THER/PROPH/DIAG IV INF INIT: CPT

## 2020-10-29 ENCOUNTER — HOSPITAL ENCOUNTER (OUTPATIENT)
Dept: HOSPITAL 53 - M LAB REF | Age: 60
End: 2020-10-29
Attending: INTERNAL MEDICINE
Payer: COMMERCIAL

## 2020-10-29 DIAGNOSIS — D64.9: Primary | ICD-10-CM

## 2020-10-29 LAB
FERRITIN SERPL-MCNC: 44 NG/ML (ref 26–388)
FOLATE SERPL-MCNC: 11.8 NG/ML
IRON SATN MFR SERPL: 32.1 % (ref 19.7–50)
IRON SERPL-MCNC: 110 UG/DL (ref 65–175)
TIBC SERPL-MCNC: 343 UG/DL (ref 250–450)
VIT B12 SERPL-MCNC: 368 PG/ML

## 2020-11-05 ENCOUNTER — HOSPITAL ENCOUNTER (OUTPATIENT)
Dept: HOSPITAL 53 - M RAD | Age: 60
End: 2020-11-05
Attending: INTERNAL MEDICINE
Payer: COMMERCIAL

## 2020-11-05 DIAGNOSIS — R93.41: ICD-10-CM

## 2020-11-05 DIAGNOSIS — E11.9: Primary | ICD-10-CM

## 2020-11-05 DIAGNOSIS — I10: ICD-10-CM

## 2020-11-05 PROCEDURE — 74170 CT ABD WO CNTRST FLWD CNTRST: CPT

## 2020-11-05 NOTE — REP
INDICATION:

ABN FINDING ON IMAGING W/ HTN RT RENAL MASS  FILE ROOM.



COMPARISON:

Abdominal ultrasound 10/19/2020, CT 01/26/2020



TECHNIQUE:

Noncontrast images through the abdomen followed by bolus of 100 mL Isovue 370 the

arterial and venous phase imaging well as delayed scans through the abdomen.



FINDINGS:

The lung bases are clear heart is not enlarged there is no pericardial thickening or

effusion.  See no hiatal hernia stomach collapsed the liver, spleen, gallbladder and

pancreas are unremarkable adrenal glands are normal small bowel loops are fluid filled

and not dilated there is scattered stool and gas in the colon without colitis or

diverticulitis.  Abdominal aorta shows atherosclerotic calcification without aneurysm

or dissection.  There is no periaortic, retroperitoneal or mesenteric pathologic sized

lymphadenopathy.  There is no evidence of perforation or free air.



Left kidney shows a cortical cyst measuring 11 mm on image 50 of the delayed sequence.

There is no other cyst or solid mass.  The renal sinus fat was unremarkable.

Corticomedullary differentiation and enhancement patterns are normal for both kidneys.

No perinephric edema symmetric size for both kidneys.  The bone windows show lumbar

and lower thoracic spine with some marginal osteophytes and Schmorl's nodes but no

destructive lesion or fractures.  No malalignment.  Posterior elements intact

visualized lower ribs are also unremarkable.



IMPRESSION:



1. There is no CT evidence for a renal mass on three-phase contrast imaging of the

kidneys and following the noncontrast scan.  Accordingly, the finding on ultrasound is

a pseudo lesion.  It may be technical artifact from overlying attenuation of sound by

bowel loops.

2. No renal ureteral or bladder stone.  An 11 mm cyst noted in the cortex of the left

kidney.

3. Atherosclerotic calcifications of the aorta without aneurysm or dissection.  No

other significant or acute finding.







<Electronically signed by Al Barraza > 11/05/20 0538

## 2020-12-03 ENCOUNTER — HOSPITAL ENCOUNTER (OUTPATIENT)
Dept: HOSPITAL 53 - M LAB REF | Age: 60
End: 2020-12-03
Attending: INTERNAL MEDICINE
Payer: COMMERCIAL

## 2020-12-03 DIAGNOSIS — R93.41: Primary | ICD-10-CM

## 2020-12-16 ENCOUNTER — HOSPITAL ENCOUNTER (INPATIENT)
Dept: HOSPITAL 53 - M ED | Age: 60
LOS: 8 days | Discharge: HOME | DRG: 420 | End: 2020-12-24
Attending: GENERAL PRACTICE | Admitting: INTERNAL MEDICINE
Payer: COMMERCIAL

## 2020-12-16 VITALS — DIASTOLIC BLOOD PRESSURE: 64 MMHG | SYSTOLIC BLOOD PRESSURE: 105 MMHG

## 2020-12-16 VITALS — DIASTOLIC BLOOD PRESSURE: 71 MMHG | SYSTOLIC BLOOD PRESSURE: 127 MMHG

## 2020-12-16 VITALS — WEIGHT: 168.65 LBS | HEIGHT: 75 IN | BODY MASS INDEX: 20.97 KG/M2

## 2020-12-16 DIAGNOSIS — I10: ICD-10-CM

## 2020-12-16 DIAGNOSIS — M54.5: ICD-10-CM

## 2020-12-16 DIAGNOSIS — E78.5: ICD-10-CM

## 2020-12-16 DIAGNOSIS — E10.43: ICD-10-CM

## 2020-12-16 DIAGNOSIS — K29.70: ICD-10-CM

## 2020-12-16 DIAGNOSIS — Z79.82: ICD-10-CM

## 2020-12-16 DIAGNOSIS — E83.39: ICD-10-CM

## 2020-12-16 DIAGNOSIS — N17.9: ICD-10-CM

## 2020-12-16 DIAGNOSIS — E10.10: Primary | ICD-10-CM

## 2020-12-16 DIAGNOSIS — K21.00: ICD-10-CM

## 2020-12-16 DIAGNOSIS — E83.42: ICD-10-CM

## 2020-12-16 DIAGNOSIS — Z88.8: ICD-10-CM

## 2020-12-16 DIAGNOSIS — Z79.899: ICD-10-CM

## 2020-12-16 DIAGNOSIS — F32.9: ICD-10-CM

## 2020-12-16 DIAGNOSIS — I95.1: ICD-10-CM

## 2020-12-16 DIAGNOSIS — G47.00: ICD-10-CM

## 2020-12-16 DIAGNOSIS — K31.84: ICD-10-CM

## 2020-12-16 LAB
ALBUMIN SERPL BCG-MCNC: 4.6 GM/DL (ref 3.2–5.2)
ALT SERPL W P-5'-P-CCNC: 24 U/L (ref 12–78)
B-OH-BUTYR SERPL-MCNC: > 46 MG/DL (ref ?–2.81)
BASE EXCESS BLDV CALC-SCNC: -15.8 MMOL/L (ref -2–2)
BASOPHILS # BLD AUTO: 0 10^3/UL (ref 0–0.2)
BASOPHILS NFR BLD AUTO: 0.1 % (ref 0–1)
BILIRUB CONJ SERPL-MCNC: 0.2 MG/DL (ref 0–0.2)
BILIRUB SERPL-MCNC: 0.6 MG/DL (ref 0.2–1)
BUN SERPL-MCNC: 36 MG/DL (ref 7–18)
CALCIUM SERPL-MCNC: 10.2 MG/DL (ref 8.5–10.1)
CHLORIDE SERPL-SCNC: 98 MEQ/L (ref 98–107)
CK MB CFR.DF SERPL CALC: 2
CK MB SERPL-MCNC: 1.1 NG/ML (ref ?–3.6)
CK SERPL-CCNC: 55 U/L (ref 39–308)
CO2 BLDV CALC-SCNC: 13.2 MEQ/L (ref 24–28)
CO2 SERPL-SCNC: 15 MEQ/L (ref 21–32)
CREAT SERPL-MCNC: 1.53 MG/DL (ref 0.7–1.3)
EOSINOPHIL # BLD AUTO: 0 10^3/UL (ref 0–0.5)
EOSINOPHIL NFR BLD AUTO: 0 % (ref 0–3)
GFR SERPL CREATININE-BSD FRML MDRD: 50 ML/MIN/{1.73_M2} (ref 56–?)
GLUCOSE SERPL-MCNC: 521 MG/DL (ref 70–100)
HCO3 BLDV-SCNC: 12.1 MEQ/L (ref 23–27)
HCO3 STD BLDV-SCNC: 12.6 MEQ/L
HCT VFR BLD AUTO: 41.8 % (ref 42–52)
HGB BLD-MCNC: 13.5 G/DL (ref 13.5–17.5)
LIPASE SERPL-CCNC: 32 U/L (ref 73–393)
LYMPHOCYTES # BLD AUTO: 1 10^3/UL (ref 1.5–5)
LYMPHOCYTES NFR BLD AUTO: 7.4 % (ref 24–44)
MCH RBC QN AUTO: 28.3 PG (ref 27–33)
MCHC RBC AUTO-ENTMCNC: 32.3 G/DL (ref 32–36.5)
MCV RBC AUTO: 87.6 FL (ref 80–96)
MONOCYTES # BLD AUTO: 0.3 10^3/UL (ref 0–0.8)
MONOCYTES NFR BLD AUTO: 2.1 % (ref 0–5)
NEUTROPHILS # BLD AUTO: 12.3 10^3/UL (ref 1.5–8.5)
NEUTROPHILS NFR BLD AUTO: 90 % (ref 36–66)
PCO2 BLDV: 35.3 MMHG (ref 38–50)
PH BLDV: 7.15 UNITS (ref 7.33–7.43)
PLATELET # BLD AUTO: 375 10^3/UL (ref 150–450)
PO2 BLDV: 56.7 MMHG (ref 30–50)
POTASSIUM SERPL-SCNC: 5.1 MEQ/L (ref 3.5–5.1)
PROT SERPL-MCNC: 7.8 GM/DL (ref 6.4–8.2)
RBC # BLD AUTO: 4.77 10^6/UL (ref 4.3–6.1)
RSV RNA NPH QL NAA+PROBE: NEGATIVE
SAO2 % BLDV: 85.6 % (ref 60–80)
SODIUM SERPL-SCNC: 131 MEQ/L (ref 136–145)
TROPONIN I SERPL-MCNC: < 0.02 NG/ML (ref ?–0.1)
WBC # BLD AUTO: 13.7 10^3/UL (ref 4–10)

## 2020-12-16 NOTE — HPEPDOC
[facility] Medical History & Physical


Date of Admission


Dec 16, 2020


Date of Service:  Dec 16, 2020


Attending Physician:  Erika Gaming MD





History and Physical


CHIEF COMPLAINT: N/v





HISTORY OF PRESENT ILLNESS: 


Patient is a 59 y/o M with PMH of IDDM Type I with hx recurrent admissions (last

8/2020) for DKA, hx of c. diff (8/2020), HTN, HLD, Depression and chronic back 

pain who presented to the ER with complaints of incr N/V, incr BS over the past 

several days. The patient states he has had over 10 episodes of nausea with 

vomiting, nonbloody over the past several days. He's also had associated incre

ased weakness, diffuse abdominal pain and dysuria. The patient states that he 

last had a meal several days ago and has not been able to keep his home 

medications down. Normally compliant with all of his home medications. He denies

diarrhea (recent diagnosis of C. difficile on 8/2020 and was treated with oral 

vancomycin), fevers, chills, sick contacts, shortness of breath, chest pain, 

medication changes recently.





In ER, VS stable. CXR neg. UA pending urine sample. Charlie labs include WBC 13.7, 

sodium 131, potassium 5.1, creatinine 1.53 (creatinine at baseline is within 

normal limits] disease, blood sugar 521, AG 18. UA negative for UTI, chest x-ray

negative for acute findings. Serum acetone positive. VBG showed pH 7.152/PCO2 

35. The patient had no other clear cause for DKA and denied recent 

hospitalizations or sicknesses. He was started on insulin drip and IV fluids. 

The patient was admitted by medicine service to the ICU for continued treatment 

of moderate diabetic ketoacidosis requiring insulin drip.





ROS: neg except mentioned above 





PAST MEDICAL HISTORY:


IDDM Type I with hx recurrent admissions (last 8/2020) for DKA


HTN


HLD


Depression


Chronic back pain


Hx of C. diff 





PAST SURGICAL HISTORY:


Laminectomy


b/l hernia repair





SOCIAL HISTORY:


Smoke 1/2 ppd. Social alcohol use. Denies drug use. 





FAMILY HISTORY:


Father- leukemia





ALLERGIES: 


Please see below. 





HOME MEDICATIONS: 


Please see below.





PHYSICAL EXAMINATION: 


VS: please see below 


CONSTITUTIONAL: No acute distress, resting comfortably, AAO x 3


EYES: PERRLA, EOM intact


HENT, MOUTH: Normocephalic, atraumatic, dry mucous membranes


NECK: SUPPLE, no JVD, no lymphadenopathy, no carotid bruit


CV: Regular rate and rhythm, S1S2 normal, no murmurs/rubs/gallops


RESPIRATORY: Clear to auscultation bilaterally, no rales/rhonchi/wheezes


GI:  BS positive in 4 quadrants, soft, mild diffuse abd tenderness , 

nondistended, no rebound or guarding, no organomegaly


: Deferred


MUSCULOSKELETAL: Normal ROM. No cyanosis, clubbing, swelling, joint deformity, 

extremity edema


INTEGUMENTARY:  Intact, no rashes, no lesions, no erythema


NEUROLOGIC: Cranial Nerves II-XII are intact, no focal deficits


PSYCHIATRIC: Mood and affect are normal 





LABORATORY DATA: 


Please see below 





IMAGING: 


CXR: 


No active disease 





ASSESSMENT: Patient is a 59 y/o M with PMH of IDDM Type I with hx recurrent 

admissions (last 8/2020) for DKA, hx of c. diff (8/2020), HTN, HLD, Depression 

and chronic back pain admitted by medicine service to the ICU for continued 

treatment of moderate diabetic ketoacidosis requiring insulin drip.





PLAN:  


Diabetic ketoacidosis, moderate severity 


-AG 18, , + ketones, VBG above


-Unknown cause, no clear infection cause 


-Hx of recurrent admissions (last 8/2020) for DKA


-Starting on insulin gtt, IVFs at 125 cc/hr, zofran, NPO except water, ice chips




-F/u BMP, mag, phos Q4hrs, replace lytes PRN 





Acute kidney injury likely 2/2 prerenal cause, dehydration


-Cr at baseline wnl,this admission 1.53


-Hold ACEi


-C/w IVFs at 125 cc/hr, zofran PRN, can have water and Ice chips 


-F/u Am labs, avoid nephrotoxic meds 





HTN


-Holding home ACEi due to MERLYN 





HLD


-C/w statin 





Depression


-Stable


-C/w home duloxetine 





Chronic back pain


-Stable 


-C/w tylenol PRN 





Hx of C. diff


-Diagnosed in 8/2020


-Treated with PO vancomycin as o/p, followed with Dr. Roper


-No diarrhea in 1 week, last BM was formed yesterday 


-Try to avoid abx if possible


-Probiotic with meals when resume PO diet 





Insomnia 


-C/w zolpidem 





GI px 


-PPI IV 





DVT px 


-Heparin SC 





DISPOSITION: Admitted to ICU for treatment of DKA. Plan is discharge home when 

medically improved.





Vital Signs





Vital Signs








  Date Time  Temp Pulse Resp B/P (MAP) Pulse Ox O2 Delivery O2 Flow Rate FiO2


 


12/16/20 17:30  90 16 150/67 (94) 100   


 


12/16/20 15:31 98.3       


 


12/16/20 14:52      Room Air  











Laboratory Data


Labs 24H


Laboratory Tests 2


12/16/20 15:30: 


Blood Gas Bicarbonate Standard 12.6, Venous Blood pH 7.152L, Venous Blood 

Partial Pressure CO2 35.3L, Venous Blood Partial Pressure O2 56.7H, Venous Blood

Total Carbon Dioxide 13.2L, Venous Blood HCO3 12.1L, Venous Blood Oxygen 

Saturation 85.6H, Venous Blood Base Excess -15.8L, Anion Gap 18H, Glomerular 

Filtration Rate 50.0L, Calcium Level 10.2H, Total Bilirubin 0.6, Direct 

Bilirubin 0.2, Aspartate Amino Transf (AST/SGOT) 4L, Alanine Aminotransferase 

(ALT/SGPT) 24, Alkaline Phosphatase 74, Total Creatine Kinase 55, Creatine 

Kinase MB 1.1, Creatine Kinase MB Relative Index 2.00, Troponin I < 0.02, Total 

Protein 7.8, Albumin 4.6, Albumin/Globulin Ratio 1.4, Lipase 32L, B-

Hydroxybutyrate > 46.00H


12/16/20 15:31: 


Immature Granulocyte % (Auto) 0.4, Neutrophils (%) (Auto) 90.0H, Lymphocytes (%)

(Auto) 7.4L, Monocytes (%) (Auto) 2.1, Eosinophils (%) (Auto) 0.0, Basophils (%)

(Auto) 0.1, Neutrophils # (Auto) 12.3H, Lymphocytes # (Auto) 1.0L, Monocytes # 

(Auto) 0.3, Eosinophils # (Auto) 0.0, Basophils # (Auto) 0.0, Nucleated Red 

Blood Cells % (auto) 0.0, Coronavirus (COVID-19)(PCR) NEGATIVE, Influenza Type A

(RT-PCR) NEGATIVE, Influenza Type B (RT-PCR) NEGATIVE, Respiratory Syncytial 

Virus (PCR) NEGATIVE


CBC/BMP


Laboratory Tests


12/16/20 15:30








12/16/20 15:31











Home Medications


Scheduled


Aspirin (Aspirin EC) 81 Mg Tab, 81 MG PO DAILY


Atorvastatin Calcium (Atorvastatin Calcium) 20 Mg Tablet, 20 MG PO DAILY


Duloxetine Hcl (Cymbalta) 60 Mg Cap, 60 MG PO DAILY


Duloxetine Hcl (Duloxetine HCl) 30 Mg Cap, 30 MG PO QHS


Insulin Glargine,Hum.rec.anlog (Basaglar Kwikpen U-100) 100 Unit/1 Ml 

Insuln.pen, 65 UNIT SC DAILY


Insulin Lispro (Admelog Solostar) 100 Unit/1 Ml Insuln.pen, 1 UNIT SC TID


   SLIDING SCALE -200 =8 UNITS    201-250 = 10 UNITS    251-300 =12 UNITS 


Lisinopril (Lisinopril) 5 Mg Tab, 5 MG PO DAILY


Zolpidem Tartrate (Ambien) 10 Mg Tab, 10 MG PO QHS





Scheduled PRN


Acetaminophen with Codeine (Acetaminophen-Cod #3 Tablet) 1 Tab Tab, 2 TAB PO q6-

8h PRN for PAIN


Ondansetron HCl (Ondansetron HCl) 4 Mg Tablet, 4 MG PO Q4H PRN for NAUSEA OR 

VOMITING





Allergies


Coded Allergies:  


     pregabalin (Verified  Adverse Reaction, Unknown, LEG SWELLING, 12/16/20)





A-FIB/CHADSVASC


A-FIB History


Current/History of A-Fib/PAF?:  No


Current PO Anticoag Therapy:  No





Age/Risk Factor Scoring


CHADSVASC:  








CHADSVASC Response (Comments) Value


 


Age Risk Factor Age < 65 years old 0


 


Gender Risk Factor Male 0


 


Hx of CHF No 0


 


Hx of HTN Yes 1


 


Hx of Stroke/TIA/or VTE No 0


 


Hx of Diabetes Yes 1


 


Hx of Vascular Disease No 0


 


Total  2











Treatment


Treatment ordered:  Other


Other anticoagulant ordered:  heparin











Erika Gaming MD            Dec 16, 2020 17:46

## 2020-12-16 NOTE — REP
INDICATION:

cough.



COMPARISON:

Comparison chest x-ray August 30, 2020..



TECHNIQUE:

Portable AP sitting chest x-ray.



FINDINGS:

Monitoring electrodes are present.  The lungs are well inflated and clear.  The

pleural angles are sharp.  Heart size is normal.  Pulmonary vasculature is not

increased.  No significant bony abnormality.



IMPRESSION:

No active disease.





<Electronically signed by Yann Chicas > 12/16/20 [id number]

## 2020-12-17 VITALS — SYSTOLIC BLOOD PRESSURE: 144 MMHG | DIASTOLIC BLOOD PRESSURE: 75 MMHG

## 2020-12-17 VITALS — DIASTOLIC BLOOD PRESSURE: 70 MMHG | SYSTOLIC BLOOD PRESSURE: 124 MMHG

## 2020-12-17 VITALS — DIASTOLIC BLOOD PRESSURE: 79 MMHG | SYSTOLIC BLOOD PRESSURE: 131 MMHG

## 2020-12-17 VITALS — SYSTOLIC BLOOD PRESSURE: 135 MMHG | DIASTOLIC BLOOD PRESSURE: 71 MMHG

## 2020-12-17 VITALS — SYSTOLIC BLOOD PRESSURE: 123 MMHG | DIASTOLIC BLOOD PRESSURE: 70 MMHG

## 2020-12-17 VITALS — SYSTOLIC BLOOD PRESSURE: 158 MMHG | DIASTOLIC BLOOD PRESSURE: 74 MMHG

## 2020-12-17 VITALS — SYSTOLIC BLOOD PRESSURE: 118 MMHG | DIASTOLIC BLOOD PRESSURE: 68 MMHG

## 2020-12-17 VITALS — DIASTOLIC BLOOD PRESSURE: 73 MMHG | SYSTOLIC BLOOD PRESSURE: 119 MMHG

## 2020-12-17 VITALS — SYSTOLIC BLOOD PRESSURE: 142 MMHG | DIASTOLIC BLOOD PRESSURE: 77 MMHG

## 2020-12-17 VITALS — SYSTOLIC BLOOD PRESSURE: 136 MMHG | DIASTOLIC BLOOD PRESSURE: 72 MMHG

## 2020-12-17 VITALS — SYSTOLIC BLOOD PRESSURE: 152 MMHG | DIASTOLIC BLOOD PRESSURE: 79 MMHG

## 2020-12-17 VITALS — SYSTOLIC BLOOD PRESSURE: 113 MMHG | DIASTOLIC BLOOD PRESSURE: 64 MMHG

## 2020-12-17 VITALS — DIASTOLIC BLOOD PRESSURE: 73 MMHG | SYSTOLIC BLOOD PRESSURE: 124 MMHG

## 2020-12-17 VITALS — SYSTOLIC BLOOD PRESSURE: 135 MMHG | DIASTOLIC BLOOD PRESSURE: 77 MMHG

## 2020-12-17 VITALS — DIASTOLIC BLOOD PRESSURE: 74 MMHG | SYSTOLIC BLOOD PRESSURE: 134 MMHG

## 2020-12-17 VITALS — DIASTOLIC BLOOD PRESSURE: 69 MMHG | SYSTOLIC BLOOD PRESSURE: 139 MMHG

## 2020-12-17 VITALS — SYSTOLIC BLOOD PRESSURE: 116 MMHG | DIASTOLIC BLOOD PRESSURE: 77 MMHG

## 2020-12-17 VITALS — DIASTOLIC BLOOD PRESSURE: 69 MMHG | SYSTOLIC BLOOD PRESSURE: 133 MMHG

## 2020-12-17 VITALS — DIASTOLIC BLOOD PRESSURE: 78 MMHG | SYSTOLIC BLOOD PRESSURE: 151 MMHG

## 2020-12-17 VITALS — SYSTOLIC BLOOD PRESSURE: 130 MMHG | DIASTOLIC BLOOD PRESSURE: 73 MMHG

## 2020-12-17 VITALS — DIASTOLIC BLOOD PRESSURE: 62 MMHG | SYSTOLIC BLOOD PRESSURE: 112 MMHG

## 2020-12-17 VITALS — SYSTOLIC BLOOD PRESSURE: 140 MMHG | DIASTOLIC BLOOD PRESSURE: 75 MMHG

## 2020-12-17 VITALS — SYSTOLIC BLOOD PRESSURE: 143 MMHG | DIASTOLIC BLOOD PRESSURE: 75 MMHG

## 2020-12-17 VITALS — SYSTOLIC BLOOD PRESSURE: 134 MMHG | DIASTOLIC BLOOD PRESSURE: 78 MMHG

## 2020-12-17 VITALS — SYSTOLIC BLOOD PRESSURE: 130 MMHG | DIASTOLIC BLOOD PRESSURE: 59 MMHG

## 2020-12-17 VITALS — DIASTOLIC BLOOD PRESSURE: 76 MMHG | SYSTOLIC BLOOD PRESSURE: 129 MMHG

## 2020-12-17 VITALS — SYSTOLIC BLOOD PRESSURE: 131 MMHG | DIASTOLIC BLOOD PRESSURE: 75 MMHG

## 2020-12-17 VITALS — DIASTOLIC BLOOD PRESSURE: 65 MMHG | SYSTOLIC BLOOD PRESSURE: 109 MMHG

## 2020-12-17 VITALS — DIASTOLIC BLOOD PRESSURE: 71 MMHG | SYSTOLIC BLOOD PRESSURE: 117 MMHG

## 2020-12-17 VITALS — SYSTOLIC BLOOD PRESSURE: 141 MMHG | DIASTOLIC BLOOD PRESSURE: 78 MMHG

## 2020-12-17 VITALS — SYSTOLIC BLOOD PRESSURE: 114 MMHG | DIASTOLIC BLOOD PRESSURE: 62 MMHG

## 2020-12-17 VITALS — SYSTOLIC BLOOD PRESSURE: 133 MMHG | DIASTOLIC BLOOD PRESSURE: 76 MMHG

## 2020-12-17 VITALS — DIASTOLIC BLOOD PRESSURE: 77 MMHG | SYSTOLIC BLOOD PRESSURE: 133 MMHG

## 2020-12-17 VITALS — DIASTOLIC BLOOD PRESSURE: 63 MMHG | SYSTOLIC BLOOD PRESSURE: 123 MMHG

## 2020-12-17 VITALS — DIASTOLIC BLOOD PRESSURE: 75 MMHG | SYSTOLIC BLOOD PRESSURE: 143 MMHG

## 2020-12-17 VITALS — SYSTOLIC BLOOD PRESSURE: 120 MMHG | DIASTOLIC BLOOD PRESSURE: 73 MMHG

## 2020-12-17 VITALS — SYSTOLIC BLOOD PRESSURE: 131 MMHG | DIASTOLIC BLOOD PRESSURE: 76 MMHG

## 2020-12-17 VITALS — SYSTOLIC BLOOD PRESSURE: 120 MMHG | DIASTOLIC BLOOD PRESSURE: 68 MMHG

## 2020-12-17 VITALS — SYSTOLIC BLOOD PRESSURE: 131 MMHG | DIASTOLIC BLOOD PRESSURE: 70 MMHG

## 2020-12-17 VITALS — DIASTOLIC BLOOD PRESSURE: 76 MMHG | SYSTOLIC BLOOD PRESSURE: 146 MMHG

## 2020-12-17 LAB
BUN SERPL-MCNC: 29 MG/DL (ref 7–18)
BUN SERPL-MCNC: 29 MG/DL (ref 7–18)
CALCIUM SERPL-MCNC: 8.6 MG/DL (ref 8.5–10.1)
CALCIUM SERPL-MCNC: 9.1 MG/DL (ref 8.5–10.1)
CHLORIDE SERPL-SCNC: 108 MEQ/L (ref 98–107)
CHLORIDE SERPL-SCNC: 111 MEQ/L (ref 98–107)
CO2 SERPL-SCNC: 21 MEQ/L (ref 21–32)
CO2 SERPL-SCNC: 22 MEQ/L (ref 21–32)
CREAT SERPL-MCNC: 0.95 MG/DL (ref 0.7–1.3)
CREAT SERPL-MCNC: 1.03 MG/DL (ref 0.7–1.3)
GFR SERPL CREATININE-BSD FRML MDRD: > 60 ML/MIN/{1.73_M2} (ref 56–?)
GFR SERPL CREATININE-BSD FRML MDRD: > 60 ML/MIN/{1.73_M2} (ref 56–?)
GLUCOSE SERPL-MCNC: 135 MG/DL (ref 70–100)
GLUCOSE SERPL-MCNC: 150 MG/DL (ref 70–100)
HCT VFR BLD AUTO: 33.3 % (ref 42–52)
HGB BLD-MCNC: 11.6 G/DL (ref 13.5–17.5)
MAGNESIUM SERPL-MCNC: 2.1 MG/DL (ref 1.8–2.4)
MAGNESIUM SERPL-MCNC: 2.2 MG/DL (ref 1.8–2.4)
MCH RBC QN AUTO: 29.1 PG (ref 27–33)
MCHC RBC AUTO-ENTMCNC: 34.8 G/DL (ref 32–36.5)
MCV RBC AUTO: 83.7 FL (ref 80–96)
PHOSPHATE SERPL-MCNC: 1.8 MG/DL (ref 2.5–4.9)
PHOSPHATE SERPL-MCNC: 2 MG/DL (ref 2.5–4.9)
PLATELET # BLD AUTO: 292 10^3/UL (ref 150–450)
POTASSIUM SERPL-SCNC: 3.6 MEQ/L (ref 3.5–5.1)
POTASSIUM SERPL-SCNC: 3.9 MEQ/L (ref 3.5–5.1)
RBC # BLD AUTO: 3.98 10^6/UL (ref 4.3–6.1)
SODIUM SERPL-SCNC: 138 MEQ/L (ref 136–145)
SODIUM SERPL-SCNC: 139 MEQ/L (ref 136–145)
WBC # BLD AUTO: 11.2 10^3/UL (ref 4–10)

## 2020-12-17 PROCEDURE — 0DJ08ZZ INSPECTION OF UPPER INTESTINAL TRACT, VIA NATURAL OR ARTIFICIAL OPENING ENDOSCOPIC: ICD-10-PCS | Performed by: SURGERY

## 2020-12-17 RX ADMIN — SODIUM CHLORIDE SCH UNITS: 4.5 INJECTION, SOLUTION INTRAVENOUS at 05:55

## 2020-12-17 RX ADMIN — DEXTROSE AND SODIUM CHLORIDE SCH MLS/HR: 5; 450 INJECTION, SOLUTION INTRAVENOUS at 00:33

## 2020-12-17 RX ADMIN — ACETAMINOPHEN PRN MG: 325 TABLET ORAL at 08:56

## 2020-12-17 RX ADMIN — ASPIRIN SCH MG: 81 TABLET ORAL at 08:55

## 2020-12-17 RX ADMIN — INSULIN LISPRO SCH UNITS: 100 INJECTION, SOLUTION INTRAVENOUS; SUBCUTANEOUS at 12:39

## 2020-12-17 RX ADMIN — DEXTROSE AND SODIUM CHLORIDE SCH MLS/HR: 5; 450 INJECTION, SOLUTION INTRAVENOUS at 18:28

## 2020-12-17 RX ADMIN — SODIUM CHLORIDE SCH UNITS: 4.5 INJECTION, SOLUTION INTRAVENOUS at 14:14

## 2020-12-17 RX ADMIN — ONDANSETRON PRN MG: 2 INJECTION INTRAMUSCULAR; INTRAVENOUS at 09:06

## 2020-12-17 RX ADMIN — Medication SCH: at 03:54

## 2020-12-17 RX ADMIN — INSULIN DETEMIR SCH UNITS: 100 INJECTION, SOLUTION SUBCUTANEOUS at 08:55

## 2020-12-17 RX ADMIN — ATORVASTATIN CALCIUM SCH MG: 20 TABLET, FILM COATED ORAL at 08:55

## 2020-12-17 RX ADMIN — INSULIN LISPRO SCH UNITS: 100 INJECTION, SOLUTION INTRAVENOUS; SUBCUTANEOUS at 18:28

## 2020-12-17 RX ADMIN — DEXTROSE AND SODIUM CHLORIDE SCH MLS/HR: 5; 450 INJECTION, SOLUTION INTRAVENOUS at 20:22

## 2020-12-17 RX ADMIN — DULOXETINE HYDROCHLORIDE SCH MG: 30 CAPSULE, DELAYED RELEASE ORAL at 08:55

## 2020-12-17 RX ADMIN — DEXTROSE AND SODIUM CHLORIDE SCH MLS/HR: 5; 450 INJECTION, SOLUTION INTRAVENOUS at 12:38

## 2020-12-17 RX ADMIN — SODIUM CHLORIDE SCH UNITS: 4.5 INJECTION, SOLUTION INTRAVENOUS at 21:01

## 2020-12-17 RX ADMIN — METOCLOPRAMIDE SCH MG: 5 INJECTION, SOLUTION INTRAMUSCULAR; INTRAVENOUS at 21:02

## 2020-12-17 RX ADMIN — Medication SCH: at 01:05

## 2020-12-17 RX ADMIN — METOCLOPRAMIDE SCH MG: 5 INJECTION, SOLUTION INTRAMUSCULAR; INTRAVENOUS at 15:22

## 2020-12-17 RX ADMIN — PANTOPRAZOLE SODIUM SCH MG: 40 TABLET, DELAYED RELEASE ORAL at 10:58

## 2020-12-17 RX ADMIN — ONDANSETRON PRN MG: 2 INJECTION INTRAMUSCULAR; INTRAVENOUS at 00:44

## 2020-12-17 RX ADMIN — KETOROLAC TROMETHAMINE SCH MG: 30 INJECTION, SOLUTION INTRAMUSCULAR at 15:23

## 2020-12-17 RX ADMIN — INSULIN LISPRO SCH UNITS: 100 INJECTION, SOLUTION INTRAVENOUS; SUBCUTANEOUS at 21:00

## 2020-12-17 RX ADMIN — SODIUM CHLORIDE SCH UNITS: 4.5 INJECTION, SOLUTION INTRAVENOUS at 00:45

## 2020-12-17 NOTE — ECGEPIP
[facility] - ED

                                       

                                       Test Date:    2020

Pat Name:     FRANCISCO BLANCA             Department:   

Patient ID:   [id number]                 Room:         -

Gender:       Male                     Technician:   ROSE

:          [id number]               Requested By: JELANI PASTOR

Order Number: [id number]     Reading MD:   Kamlesh Aranda

                                 Measurements

Intervals                              Axis          

Rate:         91                       P:            86

IL:           136                      QRS:          61

QRSD:         85                       T:            90

QT:           329                                    

QTc:          406                                    

                           Interpretive Statements

SINUS RHYTHM

NONSPECIFIC ST & T-WAVE ABNORMALITY

SIMILAR TO 20

Electronically Signed on 2020 13:24:44 EST by Kamlesh Aranda

## 2020-12-17 NOTE — REP
INDICATION:

abdominal pain, acute on chronic.



COMPARISON:

Abdomen and pelvis CT studies dated 11/05/2020 and 01/26/2020.



TECHNIQUE:

The current study is performed without IV or bowel contrast.



FINDINGS:

The visualized lower lung fields are unremarkable.

The unenhanced hepatic parenchyma, gallbladder, pancreas, spleen, adrenals, kidneys

and abdominal aorta are unremarkable.

There is no bowel distention or obstruction.  The mesentery is unremarkable.  There is

a small intraluminal density in the small bowel loop on the left, likely ingested

capsule or tablet.

Pelvis:

The appendix is unremarkable.  The bladder is unremarkable.  There is no ascites or

adenopathy.  The pelvic bowel loops are unremarkable.  The bladder is unremarkable.



IMPRESSION:

There is a small focal density within a small bowel loop on the left, likely an

ingested tablet or capsule.

No bowel distention or obstruction.  No focal fluid collection to suggest abscess or

hematoma.  No ascites.  No mass or adenopathy.  No pneumoperitoneum.

Otherwise, essentially negative CT of the abdomen and pelvis.





<Electronically signed by Renard Abdi > 12/17/20 [id number]

## 2020-12-17 NOTE — IPNPDOC
Date Seen


The patient was seen on 12/17/20.





Progress Note


SUBJECTIVE: 


Complains of nausea with dry heaves, started reglan TID. States he was 

previously on it and it helped, is currently being worked up for chronic 

abdominal pain. CT abd/pelvis neg for acute issues. Stopped insulin gtt, 

transitioned to SC levemir, ISS. On D5W until eating more, abd pain so started 

pain meds. Denies chest pain, incr SOB, fevers, chills. 





OBJECTIVE: 





PHYSICAL EXAMINATION: 


VS: please see below 


CONSTITUTIONAL: No acute distress but appears uncomfortable, AAO x 3


EYES: PERRLA, EOM intact


HENT, MOUTH: Normocephalic, atraumatic, dry mucous membranes


NECK: SUPPLE, no JVD, no lymphadenopathy, no carotid bruit


CV: Regular rate and rhythm, S1S2 normal, no murmurs/rubs/gallops


RESPIRATORY: Clear to auscultation bilaterally, no rales/rhonchi/wheezes


GI:  abdominal tenderness diffuse, BS positive in 4 quadrants, soft, 

nondistended, no rebound or guarding, no organomegaly


: Deferred


MUSCULOSKELETAL: Normal ROM. No cyanosis, clubbing, swelling, joint deformity, 

extremity edema


INTEGUMENTARY:  Intact, no rashes, no lesions, no erythema


NEUROLOGIC: Cranial Nerves II-XII are intact, no focal deficits


PSYCHIATRIC: Mood and affect are normal 





LABORATORY DATA: 


Please see below 





IMAGING: 





CT abd/pelvis without contrast: 


There is a small focal density within a small bowel loop on the left, likely an 

ingested tablet or capsule. No bowel distention or obstruction.  No focal fluid 

collection to suggest abscess or hematoma.  No ascites.  No mass or adenopathy. 

No pneumoperitoneum. Otherwise, essentially negative CT of the abdomen and 

pelvis.





CXR: 


No active disease 





ASSESSMENT: Patient is a 61 y/o M with PMH of IDDM Type I with hx recurrent 

admissions (last 8/2020) for DKA, hx of c. diff (8/2020), HTN, HLD, Depression 

and chronic back pain admitted by medicine service to the ICU for continued 

treatment of moderate diabetic ketoacidosis requiring insulin drip.





PLAN:  


Diabetic ketoacidosis, moderate severity- resolved 


-AG closed, BS <150-200 for the past several hours. 


-Transitioned off insulin gtt this AM, to levemir SC, ISS AC/HS, FS AC/HS, c

onsistent carb diet 


-n/v so started reglan TID, zofran PRN, c/w D5 fluids 


-Unknown cause, no clear infection cause 


-Hx of recurrent admissions (last 8/2020) for DKA


-Daily labs





Nausea/vomiting with abdominal pain poss 2/2 to gastroparesis


-Patient states he is being actively worked up by GI and needs scope in near 

future


-CT abd pelvis: No acute issues.


-Hx of being on reglan TID but was stopped


-Will restart reglan TID for possible gastroparesis pain, zofran PRN


-CLD for now, advance as tolerated 





Hypophosphatemia, acute 


-250 mg Kphos given





HTN


-BP stable


-Holding home ACEi  for now 





HLD


-C/w statin 





Depression


-Stable


-C/w home duloxetine 





Chronic back pain


-Stable 


-C/w tylenol PRN 





Hx of C. diff


-Diagnosed in 8/2020


-Treated with PO vancomycin as o/p, followed with Dr. Roper


-No diarrhea in 1 week, last BM was formed yesterday 


-Try to avoid abx or incr PPI if possible 


-Probiotic with meals when resume PO diet 





Insomnia 


-C/w zolpidem 





GI px 


-PPI IV 





DVT px 


-Heparin SC 





Resolved issues: 


Acute kidney injury likely 2/2 prerenal cause, dehydration





DISPOSITION: Switched to med/surg today. Plan is discharge home when medically 

improved.





VS, I&O, 24H, Fishbone


Vital Signs/I&O





Vital Signs








  Date Time  Temp Pulse Resp B/P (MAP) Pulse Ox O2 Delivery O2 Flow Rate FiO2


 


12/17/20 12:00 97.3 80 20 131/79 (96) 99 Room Air  














I&O- Last 24 Hours up to 6 AM 


 


 12/17/20





 05:59


 


Intake Total 1720 ml


 


Output Total 0 ml


 


Balance 1720 ml











Laboratory Data


24H LABS


Laboratory Tests 2


12/16/20 14:56: Bedside Glucose (Misc Panel) 489H


12/16/20 15:30: 


Blood Gas Bicarbonate Standard 12.6, Venous Blood pH 7.152L, Venous Blood 

Partial Pressure CO2 35.3L, Venous Blood Partial Pressure O2 56.7H, Venous Blood

Total Carbon Dioxide 13.2L, Venous Blood HCO3 12.1L, Venous Blood Oxygen 

Saturation 85.6H, Venous Blood Base Excess -15.8L, Anion Gap 18H, Glomerular 

Filtration Rate 50.0L, Calcium Level 10.2H, Total Bilirubin 0.6, Direct 

Bilirubin 0.2, Aspartate Amino Transf (AST/SGOT) 4L, Alanine Aminotransferase (A

LT/SGPT) 24, Alkaline Phosphatase 74, Total Creatine Kinase 55, Creatine Kinase 

MB 1.1, Creatine Kinase MB Relative Index 2.00, Troponin I < 0.02, Total Protein

7.8, Albumin 4.6, Albumin/Globulin Ratio 1.4, Lipase 32L, B-Hydroxybutyrate > 

46.00H


12/16/20 15:31: 


Immature Granulocyte % (Auto) 0.4, Neutrophils (%) (Auto) 90.0H, Lymphocytes (%)

(Auto) 7.4L, Monocytes (%) (Auto) 2.1, Eosinophils (%) (Auto) 0.0, Basophils (%)

(Auto) 0.1, Neutrophils # (Auto) 12.3H, Lymphocytes # (Auto) 1.0L, Monocytes # 

(Auto) 0.3, Eosinophils # (Auto) 0.0, Basophils # (Auto) 0.0, Nucleated Red 

Blood Cells % (auto) 0.0, Coronavirus (COVID-19)(PCR) NEGATIVE, Influenza Type A

(RT-PCR) NEGATIVE, Influenza Type B (RT-PCR) NEGATIVE, Respiratory Syncytial 

Virus (PCR) NEGATIVE


12/16/20 17:39: 


Urine Color STRAW, Urine Appearance CLEAR, Urine pH 5.0, Urine Specific Gravity 

1.023, Urine Protein NEGATIVE, Urine Glucose (UA) 3+H, Urine Ketones 2+H, Urine 

Blood NEGATIVE, Urine Nitrite NEGATIVE, Urine Bilirubin NEGATIVE, Urine 

Urobilinogen 0.2, Urine Leukocyte Esterase NEGATIVE, Urine WBC (Auto) 0, Urine 

RBC (Auto) 1, Urine Hyaline Casts (Auto) 4, Urine Bacteria (Auto) NEGATIVE, 

Urine Squamous Epithelial Cells 0, Urine Sperm (Auto) 


12/16/20 18:32: Bedside Glucose (Misc Panel) 391H


12/16/20 19:10: Magnesium Level 2.4


12/16/20 20:19: Bedside Glucose (Misc Panel) 301H


12/16/20 21:46: 


Bedside Glucose (Misc Panel) 240H, Magnesium Level 2.3


12/16/20 23:16: Bedside Glucose (Misc Panel) 162H


12/17/20 00:06: Bedside Glucose (Misc Panel) 131H


12/17/20 01:04: Bedside Glucose (Misc Panel) 110H


12/17/20 01:58: 


Anion Gap 10, Glomerular Filtration Rate > 60.0, Calcium Level 8.6#, Phosphorus 

Level 2.0L, Magnesium Level 2.2


12/17/20 02:06: Bedside Glucose (Misc Panel) 123H


12/17/20 02:57: Bedside Glucose (Misc Panel) 122H


12/17/20 03:52: Bedside Glucose (Misc Panel) 137H


12/17/20 05:02: Bedside Glucose (Misc Panel) 137H


12/17/20 05:49: 


Nucleated Red Blood Cells % (auto) 0.0, Anion Gap 5L, Glomerular Filtration Rate

> 60.0, Calcium Level 9.1, Phosphorus Level 1.8L, Magnesium Level 2.1


12/17/20 05:54: Bedside Glucose (Misc Panel) 137H


12/17/20 06:58: Bedside Glucose (Misc Panel) 147H


12/17/20 07:59: Bedside Glucose (Misc Panel) 149H


12/17/20 08:34: 


Urine Color YELLOW, Urine Appearance CLEAR, Urine pH 6.0, Urine Specific Gravity

1.023, Urine Protein NEGATIVE, Urine Glucose (UA) 3+H, Urine Ketones 2+H, Urine 

Blood NEGATIVE, Urine Nitrite NEGATIVE, Urine Bilirubin NEGATIVE, Urine 

Urobilinogen 0.2, Urine Leukocyte Esterase NEGATIVE, Urine WBC (Auto) 1, Urine 

RBC (Auto) 0, Urine Hyaline Casts (Auto) 0, Urine Bacteria (Auto) NEGATIVE, 

Urine Squamous Epithelial Cells 0, Urine Mucus (Auto) SMALL, Urine Sperm (Auto) 


12/17/20 09:01: Bedside Glucose (Misc Panel) 153H


12/17/20 10:04: Bedside Glucose (Misc Panel) 202H


12/17/20 11:03: Bedside Glucose (Misc Panel) 168H


CBC/BMP


Laboratory Tests


12/16/20 15:30








12/16/20 15:31








12/17/20 01:58








12/17/20 05:49











Current Medications





Current Medications








 Medications


  (Trade)  Dose


 Ordered  Sig/Chen


 Route


 PRN Reason  Start Time


 Stop Time Status Last Admin


Dose Admin


 


 Acetaminophen


  (Tylenol Tab)  650 mg  Q6HP  PRN


 PO


 PAIN / FEVER  12/16/20 18:30


    12/17/20 08:56





 


 Aspirin


  (Ecotrin)  81 mg  DAILY


 PO


   12/17/20 09:00


    12/17/20 08:55





 


 Atorvastatin


 Calcium


  (Lipitor)  20 mg  DAILY


 PO


   12/17/20 09:00


    12/17/20 08:55





 


 Ceftriaxone


 Sodium 1 gm/


 Dextrose  50 ml @ 


 100 mls/hr  Q24H


 IV


   12/16/20 20:00


 12/16/20 18:21 DC  





 


 Dextrose


  (Dextrose 50%)  25 ml  ASDIRECTED  PRN


 IV


 SEE LABEL COMMENTS  12/17/20 11:45


     





 


 Dextrose/Sodium


 Chloride  1,000 ml @ 


 125 mls/hr  Q8H


 IV


   12/17/20 00:30


    12/17/20 12:38





 


 Duloxetine HCl


  (Cymbalta)  60 mg  DAILY


 PO


   12/17/20 09:00


    12/17/20 08:55





 


 Glucagon


  (Glucagon)  1 mg  ASDIRECTED  PRN


 SC


 SEE LABEL COMMENTS  12/17/20 11:45


     





 


 Glucose


  (Glucose)  16 GM  ASDIRECTED  PRN


 PO


 SEE LABEL COMMENTS  12/17/20 11:45


     





 


 Heparin Sodium


  (Porcine)


  (Heparin)  5,000 units  Q8H


 SC


   12/16/20 22:00


    12/17/20 14:14





 


 Home Med


  (Med Rec


 Complete!)    ASDIRECTED


 XX


   12/16/20 18:00


 12/16/20 17:50 DC  





 


 Influenza Virus


 Vaccine


  (Flublok


 Quad(Egg-Free)18y&Older


 Influenza)  0.5 ml  ASDIRECTED


 IM


   12/17/20 07:30


     





 


 Insulin Detemir


  (Levemir Insulin)  25 units  QAM


 SC


   12/17/20 09:00


    12/17/20 08:55





 


 Insulin Human


 Lispro


  (HumaLOG INSULIN)  SEE


 PROTOCOL


 TABLE  AC


 SC


   12/17/20 12:00


    12/17/20 12:39





 


 Insulin Human


 Lispro


  (HumaLOG INSULIN)  SEE


 PROTOCOL


 TABLE  QHS


 SC


   12/17/20 21:00


     





 


 Insulin Human


 Regular 100 unit/


 IV Miscellaneous


 Supplies  100 ml @ 0


 mls/hr  Q0M


 IV


   12/16/20 17:50


 12/17/20 11:53 DC 12/17/20 00:12





 


 Insulin Human


 Regular 100 unit/


 IV Miscellaneous


 Supplies  100 ml @ 7


 mls/hr  [id number]


 IV


   12/16/20 17:01


 12/16/20 18:10 DC 12/16/20 17:25





 


 Metoclopramide HCl


  (Reglan)  5 mg  AC


 PO


   12/17/20 17:30


     





 


 Metoclopramide HCl


  (Reglan)  10 mg  AC


 PO


   12/17/20 12:00


 12/17/20 12:02 DC 12/17/20 11:59





 


 Non-Formulary


 Medication


  (Insulin Iv Rate


 Change


 Documentation ml/


 Hr)    ASDIRECTED


 XX


   12/16/20 17:15


 12/16/20 18:10 DC  





 


 Non-Formulary


 Medication


  (Insulin Iv Rate


 Change


 Documentation ml/


 Hr)    ASDIRECTED


 XX


   12/16/20 18:00


 12/17/20 11:53 DC 12/17/20 03:54





 


 Ondansetron HCl


  (ZOFRAN


 INJection)  4 mg  Q6HP  PRN


 IV


 NAUSEA OR VOMITING  12/16/20 18:30


    12/17/20 09:06





 


 Pantoprazole


 Sodium


  (Protonix)  40 mg  DAILY


 IV


   12/17/20 09:00


 12/17/20 08:51 DC  





 


 Pantoprazole


 Sodium


  (Protonix)  40 mg  QAM


 PO


   12/17/20 09:00


    12/17/20 10:58





 


 Sodium Chloride  1,000 ml @ 


 125 mls/hr  Q8H


 IV


   12/16/20 17:50


 12/17/20 00:26 DC 12/16/20 18:18





 


 Sodium Chloride  1,000 ml @ 


 150 mls/hr  Q6H40M


 IV


   12/16/20 17:44


 12/17/20 00:05 DC 12/16/20 18:09





 


 Zolpidem Tartrate


  (Ambien)  10 mg  QHS


 PO


   12/16/20 21:00


    12/17/20 01:13














Allergies


Coded Allergies:  


     pregabalin (Verified  Adverse Reaction, Unknown, LEG SWELLING, 12/16/20)











Erika Gaming MD            Dec 17, 2020 15:03

## 2020-12-18 VITALS — SYSTOLIC BLOOD PRESSURE: 145 MMHG | DIASTOLIC BLOOD PRESSURE: 85 MMHG

## 2020-12-18 VITALS — DIASTOLIC BLOOD PRESSURE: 88 MMHG | SYSTOLIC BLOOD PRESSURE: 148 MMHG

## 2020-12-18 VITALS — DIASTOLIC BLOOD PRESSURE: 71 MMHG | SYSTOLIC BLOOD PRESSURE: 115 MMHG

## 2020-12-18 LAB
ALBUMIN SERPL BCG-MCNC: 3.3 GM/DL (ref 3.2–5.2)
ALT SERPL W P-5'-P-CCNC: 18 U/L (ref 12–78)
BILIRUB SERPL-MCNC: 0.7 MG/DL (ref 0.2–1)
BUN SERPL-MCNC: 19 MG/DL (ref 7–18)
CALCIUM SERPL-MCNC: 8.6 MG/DL (ref 8.5–10.1)
CHLORIDE SERPL-SCNC: 107 MEQ/L (ref 98–107)
CO2 SERPL-SCNC: 24 MEQ/L (ref 21–32)
CREAT SERPL-MCNC: 0.78 MG/DL (ref 0.7–1.3)
GFR SERPL CREATININE-BSD FRML MDRD: > 60 ML/MIN/{1.73_M2} (ref 56–?)
GLUCOSE SERPL-MCNC: 223 MG/DL (ref 70–100)
HCT VFR BLD AUTO: 32.3 % (ref 42–52)
HGB BLD-MCNC: 10.9 G/DL (ref 13.5–17.5)
MCH RBC QN AUTO: 28.2 PG (ref 27–33)
MCHC RBC AUTO-ENTMCNC: 33.7 G/DL (ref 32–36.5)
MCV RBC AUTO: 83.7 FL (ref 80–96)
PLATELET # BLD AUTO: 246 10^3/UL (ref 150–450)
POTASSIUM SERPL-SCNC: 3.5 MEQ/L (ref 3.5–5.1)
PROT SERPL-MCNC: 5.4 GM/DL (ref 6.4–8.2)
RBC # BLD AUTO: 3.86 10^6/UL (ref 4.3–6.1)
SODIUM SERPL-SCNC: 136 MEQ/L (ref 136–145)
WBC # BLD AUTO: 6 10^3/UL (ref 4–10)

## 2020-12-18 RX ADMIN — INSULIN LISPRO SCH UNITS: 100 INJECTION, SOLUTION INTRAVENOUS; SUBCUTANEOUS at 08:02

## 2020-12-18 RX ADMIN — KETOROLAC TROMETHAMINE SCH MG: 30 INJECTION, SOLUTION INTRAMUSCULAR at 23:04

## 2020-12-18 RX ADMIN — SUCRALFATE SCH GM: 1 SUSPENSION ORAL at 10:53

## 2020-12-18 RX ADMIN — ATORVASTATIN CALCIUM SCH MG: 20 TABLET, FILM COATED ORAL at 08:02

## 2020-12-18 RX ADMIN — METOCLOPRAMIDE SCH MG: 5 INJECTION, SOLUTION INTRAMUSCULAR; INTRAVENOUS at 15:59

## 2020-12-18 RX ADMIN — DEXTROSE AND SODIUM CHLORIDE SCH MLS/HR: 5; 450 INJECTION, SOLUTION INTRAVENOUS at 21:18

## 2020-12-18 RX ADMIN — SUCRALFATE SCH GM: 1 SUSPENSION ORAL at 11:20

## 2020-12-18 RX ADMIN — SUCRALFATE SCH GM: 1 SUSPENSION ORAL at 21:19

## 2020-12-18 RX ADMIN — KETOROLAC TROMETHAMINE SCH MG: 30 INJECTION, SOLUTION INTRAMUSCULAR at 15:59

## 2020-12-18 RX ADMIN — DULOXETINE HYDROCHLORIDE SCH MG: 30 CAPSULE, DELAYED RELEASE ORAL at 08:02

## 2020-12-18 RX ADMIN — ACETAMINOPHEN PRN MG: 325 TABLET ORAL at 18:46

## 2020-12-18 RX ADMIN — PANTOPRAZOLE SODIUM SCH MG: 40 TABLET, DELAYED RELEASE ORAL at 08:02

## 2020-12-18 RX ADMIN — METOCLOPRAMIDE SCH MG: 5 INJECTION, SOLUTION INTRAMUSCULAR; INTRAVENOUS at 21:19

## 2020-12-18 RX ADMIN — KETOROLAC TROMETHAMINE SCH MG: 30 INJECTION, SOLUTION INTRAMUSCULAR at 00:24

## 2020-12-18 RX ADMIN — INSULIN DETEMIR SCH UNITS: 100 INJECTION, SOLUTION SUBCUTANEOUS at 08:01

## 2020-12-18 RX ADMIN — SODIUM CHLORIDE SCH UNITS: 4.5 INJECTION, SOLUTION INTRAVENOUS at 05:09

## 2020-12-18 RX ADMIN — INSULIN LISPRO SCH UNITS: 100 INJECTION, SOLUTION INTRAVENOUS; SUBCUTANEOUS at 21:00

## 2020-12-18 RX ADMIN — SODIUM CHLORIDE SCH UNITS: 4.5 INJECTION, SOLUTION INTRAVENOUS at 23:03

## 2020-12-18 RX ADMIN — ASPIRIN SCH MG: 81 TABLET ORAL at 08:02

## 2020-12-18 RX ADMIN — KETOROLAC TROMETHAMINE SCH MG: 30 INJECTION, SOLUTION INTRAMUSCULAR at 08:01

## 2020-12-18 RX ADMIN — SUCRALFATE SCH GM: 1 SUSPENSION ORAL at 16:04

## 2020-12-18 RX ADMIN — DEXTROSE AND SODIUM CHLORIDE SCH MLS/HR: 5; 450 INJECTION, SOLUTION INTRAVENOUS at 03:40

## 2020-12-18 RX ADMIN — METOCLOPRAMIDE SCH MG: 5 INJECTION, SOLUTION INTRAMUSCULAR; INTRAVENOUS at 08:01

## 2020-12-18 RX ADMIN — SODIUM CHLORIDE SCH UNITS: 4.5 INJECTION, SOLUTION INTRAVENOUS at 14:04

## 2020-12-18 RX ADMIN — DEXTROSE AND SODIUM CHLORIDE SCH MLS/HR: 5; 450 INJECTION, SOLUTION INTRAVENOUS at 15:59

## 2020-12-18 RX ADMIN — INSULIN LISPRO SCH UNITS: 100 INJECTION, SOLUTION INTRAVENOUS; SUBCUTANEOUS at 17:30

## 2020-12-18 RX ADMIN — INSULIN LISPRO SCH UNITS: 100 INJECTION, SOLUTION INTRAVENOUS; SUBCUTANEOUS at 12:44

## 2020-12-18 NOTE — IPNPDOC
Date Seen


The patient was seen on 12/18/20.





Progress Note


SUBJECTIVE: 


Started AC/HS sucralfate as patient has pain after eating that is worsened. 

Reglan TID. Decreased D5W while he is just starting to eat. Denies chest pain, 

incr SOB, fevers, chills. 





OBJECTIVE: 





PHYSICAL EXAMINATION: 


VS: please see below 


CONSTITUTIONAL: No acute distress, AAO x 3


EYES: PERRLA, EOM intact


HENT, MOUTH: Normocephalic, atraumatic, dry mucous membranes


NECK: SUPPLE, no JVD, no lymphadenopathy, no carotid bruit


CV: Regular rate and rhythm, S1S2 normal, no murmurs/rubs/gallops


RESPIRATORY: Clear to auscultation bilaterally, no rales/rhonchi/wheezes


GI:  abdominal tenderness diffuse- mild , BS positive in 4 quadrants, soft, 

nondistended, no rebound or guarding, no organomegaly


: Deferred


MUSCULOSKELETAL: Normal ROM. No cyanosis, clubbing, swelling, joint deformity, 

extremity edema


INTEGUMENTARY:  Intact, no rashes, no lesions, no erythema


NEUROLOGIC: Cranial Nerves II-XII are intact, no focal deficits


PSYCHIATRIC: Mood and affect are normal 





LABORATORY DATA: 


Please see below 





IMAGING: 





CT abd/pelvis without contrast: 


There is a small focal density within a small bowel loop on the left, likely an 

ingested tablet or capsule. No bowel distention or obstruction.  No focal fluid 

collection to suggest abscess or hematoma.  No ascites.  No mass or adenopathy. 

No pneumoperitoneum. Otherwise, essentially negative CT of the abdomen and 

pelvis.





CXR: 


No active disease 





ASSESSMENT: Patient is a 61 y/o M with PMH of IDDM Type I with hx recurrent adm

issions (last 8/2020) for DKA, hx of c. diff (8/2020), HTN, HLD, Depression and 

chronic back pain admitted by medicine service to the ICU for continued 

treatment of moderate diabetic ketoacidosis requiring insulin drip.





PLAN:  


Uncontrolled DM- resolved diabetic ketoacidosis, moderate severity


-BS low 200's today


-levemir 25 U SC QAM, ISS, FS AC/HS, consistent carb diet


-As appetite increases and able to take more food in, decreased D5W to 75 cc/hr 

(eventually wean off) 


-n/v: reglan TID, zofran PRN, c/w D5 fluids 


-Unknown cause, no clear infection cause 


-Hx of recurrent admissions (last 8/2020) for DKA


-Daily labs





Nausea/vomiting with abdominal pain poss 2/2 to gastroparesis vs. ulcer? 


-Patient states he is being actively worked up by GI and needs scope in near 

future


-Tenderness focused mostly in epigastric area, worsened by eating 


-CT abd pelvis: No acute issues.


-Hx of being on reglan TID but was stopped


-C/w reglan AC, started sucralfate, zofran PRN


-Consistent carb diet, D5W decrasead to 75 cc/hr 





HTN


-BP stable


-Holding home ACEi  for now 





HLD


-C/w statin 





Depression


-Stable


-C/w home duloxetine 





Chronic back pain


-Stable 


-C/w tylenol PRN 





Hx of C. diff


-Diagnosed in 8/2020


-Treated with PO vancomycin as o/p, followed with Dr. Roper


-No diarrhea in 1 week, last BM was formed


-Try to avoid abx or incr PPI if possible 


-Probiotic with meals when resume PO diet 





Insomnia 


-C/w zolpidem 





GI px 


-PPI IV 





DVT px 


-Heparin SC 





Resolved issues: 


Acute kidney injury likely 2/2 prerenal cause, dehydration


Hypophosphatemia, acute 





DISPOSITION: PT/OT today. Plan is discharge home when medically improved.





VS, I&O, 24H, Fishbone


Vital Signs/I&O





Vital Signs








  Date Time  Temp Pulse Resp B/P (MAP) Pulse Ox O2 Delivery O2 Flow Rate FiO2


 


12/18/20 14:00 98.2 81 18 145/85 (105) 99 Room Air  














I&O- Last 24 Hours up to 6 AM 


 


 12/18/20





 05:59


 


Intake Total 3633.9 ml


 


Output Total 1325 ml


 


Balance 2308.9 ml











Laboratory Data


24H LABS


Laboratory Tests 2


12/17/20 16:47: Bedside Glucose (Misc Panel) 159H


12/17/20 20:22: Bedside Glucose (Misc Panel) 212H


12/18/20 05:35: 


Nucleated Red Blood Cells % (auto) 0.0, Anion Gap 5L, Glomerular Filtration Rate

> 60.0, Calcium Level 8.6, Total Bilirubin 0.7, Aspartate Amino Transf 

(AST/SGOT) 11, Alanine Aminotransferase (ALT/SGPT) 18, Alkaline Phosphatase 52, 

Total Protein 5.4#L, Albumin 3.3#, Albumin/Globulin Ratio 1.6


12/18/20 12:19: Bedside Glucose (Misc Panel) 246H


CBC/BMP


Laboratory Tests


12/18/20 05:35











Current Medications





Current Medications








 Medications


  (Trade)  Dose


 Ordered  Sig/Chen


 Route


 PRN Reason  Start Time


 Stop Time Status Last Admin


Dose Admin


 


 Acetaminophen


  (Tylenol Tab)  650 mg  Q6HP  PRN


 PO


 PAIN / FEVER  12/16/20 18:30


    12/17/20 08:56





 


 Aspirin


  (Ecotrin)  81 mg  DAILY


 PO


   12/17/20 09:00


    12/18/20 08:02





 


 Atorvastatin


 Calcium


  (Lipitor)  20 mg  DAILY


 PO


   12/17/20 09:00


    12/18/20 08:02





 


 Ceftriaxone


 Sodium 1 gm/


 Dextrose  50 ml @ 


 100 mls/hr  Q24H


 IV


   12/16/20 20:00


 12/16/20 18:21 DC  





 


 Dextrose


  (Dextrose 50%)  25 ml  ASDIRECTED  PRN


 IV


 SEE LABEL COMMENTS  12/17/20 11:45


     





 


 Dextrose/Sodium


 Chloride  1,000 ml @ 


 125 mls/hr  Q8H


 IV


   12/17/20 00:30


    12/18/20 03:40





 


 Duloxetine HCl


  (Cymbalta)  60 mg  DAILY


 PO


   12/17/20 09:00


    12/18/20 08:02





 


 Glucagon


  (Glucagon)  1 mg  ASDIRECTED  PRN


 SC


 SEE LABEL COMMENTS  12/17/20 11:45


     





 


 Glucose


  (Glucose)  16 GM  ASDIRECTED  PRN


 PO


 SEE LABEL COMMENTS  12/17/20 11:45


     





 


 Heparin Sodium


  (Porcine)


  (Heparin)  5,000 units  Q8H


 SC


   12/16/20 22:00


    12/18/20 14:04





 


 Home Med


  (Med Rec


 Complete!)    ASDIRECTED


 XX


   12/16/20 18:00


 12/16/20 17:50 DC  





 


 Influenza Virus


 Vaccine


  (Flublok


 Quad(Egg-Free)18y&Older


 Influenza)  0.5 ml  ASDIRECTED


 IM


   12/17/20 07:30


     





 


 Insulin Detemir


  (Levemir Insulin)  25 units  QAM


 SC


   12/17/20 09:00


    12/18/20 08:01





 


 Insulin Human


 Lispro


  (HumaLOG INSULIN)  SEE


 PROTOCOL


 TABLE  AC


 SC


   12/17/20 12:00


    12/18/20 12:44





 


 Insulin Human


 Lispro


  (HumaLOG INSULIN)  SEE


 PROTOCOL


 TABLE  QHS


 SC


   12/17/20 21:00


     





 


 Insulin Human


 Regular 100 unit/


 IV Miscellaneous


 Supplies  100 ml @ 0


 mls/hr  Q0M


 IV


   12/16/20 17:50


 12/17/20 11:53 DC 12/17/20 00:12





 


 Insulin Human


 Regular 100 unit/


 IV Miscellaneous


 Supplies  100 ml @ 7


 mls/hr  [id number]


 IV


   12/16/20 17:01


 12/16/20 18:10 DC 12/16/20 17:25





 


 Ketorolac


 Tromethamine


  (ToRADol)  15 mg  Q8H


 IV


   12/17/20 16:00


 12/22/20 15:59  12/18/20 08:01





 


 Metoclopramide HCl


  (REGLAN


 INJection)  10 mg  TID


 IV


   12/17/20 16:00


    12/18/20 08:01





 


 Metoclopramide HCl


  (Reglan)  5 mg  AC


 PO


   12/17/20 17:30


 12/17/20 14:53 DC  





 


 Metoclopramide HCl


  (Reglan)  10 mg  AC


 PO


   12/17/20 12:00


 12/17/20 12:02 DC 12/17/20 11:59





 


 Morphine Sulfate


  (Morphine


 Sulfate Inj)  1 mg  Q6H  PRN


 IV


 SEVERE PAIN (PS 8-10)  12/17/20 15:00


     





 


 Non-Formulary


 Medication


  (Insulin Iv Rate


 Change


 Documentation ml/


 Hr)    ASDIRECTED


 XX


   12/16/20 17:15


 12/16/20 18:10 DC  





 


 Non-Formulary


 Medication


  (Insulin Iv Rate


 Change


 Documentation ml/


 Hr)    ASDIRECTED


 XX


   12/16/20 18:00


 12/17/20 11:53 DC 12/17/20 03:54





 


 Ondansetron HCl


  (ZOFRAN


 INJection)  4 mg  Q6HP  PRN


 IV


 NAUSEA OR VOMITING  12/16/20 18:30


    12/17/20 09:06





 


 Pantoprazole


 Sodium


  (Protonix)  40 mg  DAILY


 IV


   12/17/20 09:00


 12/17/20 08:51 DC  





 


 Pantoprazole


 Sodium


  (Protonix)  40 mg  QAM


 PO


   12/17/20 09:00


    12/18/20 08:02





 


 Sodium Chloride  1,000 ml @ 


 125 mls/hr  Q8H


 IV


   12/16/20 17:50


 12/17/20 00:26 DC 12/16/20 18:18





 


 Sodium Chloride  1,000 ml @ 


 150 mls/hr  Q6H40M


 IV


   12/16/20 17:44


 12/17/20 00:05 DC 12/16/20 18:09





 


 Sucralfate


  (Carafate


 Suspension)  1 gm  ACHS


 PO


   12/18/20 07:30


    12/18/20 11:20





 


 Zolpidem Tartrate


  (Ambien)  10 mg  QHS


 PO


   12/16/20 21:00


    12/17/20 21:02














Allergies


Coded Allergies:  


     pregabalin (Verified  Adverse Reaction, Unknown, LEG SWELLING, 12/16/20)











Erika Gamnig MD            Dec 18, 2020 15:49

## 2020-12-19 VITALS — SYSTOLIC BLOOD PRESSURE: 120 MMHG | DIASTOLIC BLOOD PRESSURE: 65 MMHG

## 2020-12-19 VITALS — DIASTOLIC BLOOD PRESSURE: 85 MMHG | SYSTOLIC BLOOD PRESSURE: 130 MMHG

## 2020-12-19 VITALS — DIASTOLIC BLOOD PRESSURE: 87 MMHG | SYSTOLIC BLOOD PRESSURE: 148 MMHG

## 2020-12-19 LAB
ALBUMIN SERPL BCG-MCNC: 3.3 GM/DL (ref 3.2–5.2)
ALT SERPL W P-5'-P-CCNC: 23 U/L (ref 12–78)
BILIRUB SERPL-MCNC: 0.6 MG/DL (ref 0.2–1)
BUN SERPL-MCNC: 10 MG/DL (ref 7–18)
CALCIUM SERPL-MCNC: 8.4 MG/DL (ref 8.5–10.1)
CHLORIDE SERPL-SCNC: 107 MEQ/L (ref 98–107)
CHOLEST SERPL-MCNC: 160 MG/DL (ref ?–200)
CHOLEST/HDLC SERPL: 4.21 {RATIO} (ref ?–5)
CO2 SERPL-SCNC: 28 MEQ/L (ref 21–32)
CREAT SERPL-MCNC: 0.64 MG/DL (ref 0.7–1.3)
EST. AVERAGE GLUCOSE BLD GHB EST-MCNC: 260 MG/DL (ref 60–110)
GFR SERPL CREATININE-BSD FRML MDRD: > 60 ML/MIN/{1.73_M2} (ref 56–?)
GLUCOSE SERPL-MCNC: 264 MG/DL (ref 70–100)
HCT VFR BLD AUTO: 32.9 % (ref 42–52)
HDLC SERPL-MCNC: 38 MG/DL (ref 40–?)
HGB BLD-MCNC: 11.1 G/DL (ref 13.5–17.5)
LDLC SERPL CALC-MCNC: 99 MG/DL (ref ?–100)
MCH RBC QN AUTO: 28.2 PG (ref 27–33)
MCHC RBC AUTO-ENTMCNC: 33.7 G/DL (ref 32–36.5)
MCV RBC AUTO: 83.7 FL (ref 80–96)
NONHDLC SERPL-MCNC: 122 MG/DL
PLATELET # BLD AUTO: 218 10^3/UL (ref 150–450)
POTASSIUM SERPL-SCNC: 3.8 MEQ/L (ref 3.5–5.1)
PROT SERPL-MCNC: 5.4 GM/DL (ref 6.4–8.2)
RBC # BLD AUTO: 3.93 10^6/UL (ref 4.3–6.1)
SODIUM SERPL-SCNC: 140 MEQ/L (ref 136–145)
TRIGL SERPL-MCNC: 115 MG/DL (ref ?–150)
WBC # BLD AUTO: 3.7 10^3/UL (ref 4–10)

## 2020-12-19 RX ADMIN — INSULIN LISPRO SCH UNITS: 100 INJECTION, SOLUTION INTRAVENOUS; SUBCUTANEOUS at 20:23

## 2020-12-19 RX ADMIN — ATORVASTATIN CALCIUM SCH MG: 20 TABLET, FILM COATED ORAL at 09:19

## 2020-12-19 RX ADMIN — METOCLOPRAMIDE SCH MG: 5 INJECTION, SOLUTION INTRAMUSCULAR; INTRAVENOUS at 17:26

## 2020-12-19 RX ADMIN — SUCRALFATE SCH GM: 1 SUSPENSION ORAL at 09:19

## 2020-12-19 RX ADMIN — KETOROLAC TROMETHAMINE SCH MG: 30 INJECTION, SOLUTION INTRAMUSCULAR at 09:21

## 2020-12-19 RX ADMIN — KETOROLAC TROMETHAMINE SCH MG: 30 INJECTION, SOLUTION INTRAMUSCULAR at 16:00

## 2020-12-19 RX ADMIN — INSULIN LISPRO SCH UNITS: 100 INJECTION, SOLUTION INTRAVENOUS; SUBCUTANEOUS at 09:20

## 2020-12-19 RX ADMIN — SUCRALFATE SCH GM: 1 SUSPENSION ORAL at 17:27

## 2020-12-19 RX ADMIN — INSULIN LISPRO SCH UNITS: 100 INJECTION, SOLUTION INTRAVENOUS; SUBCUTANEOUS at 17:27

## 2020-12-19 RX ADMIN — SODIUM CHLORIDE SCH UNITS: 4.5 INJECTION, SOLUTION INTRAVENOUS at 06:27

## 2020-12-19 RX ADMIN — KETOROLAC TROMETHAMINE SCH MG: 30 INJECTION, SOLUTION INTRAMUSCULAR at 23:41

## 2020-12-19 RX ADMIN — INSULIN LISPRO SCH UNITS: 100 INJECTION, SOLUTION INTRAVENOUS; SUBCUTANEOUS at 13:14

## 2020-12-19 RX ADMIN — METOCLOPRAMIDE SCH MG: 5 INJECTION, SOLUTION INTRAMUSCULAR; INTRAVENOUS at 09:19

## 2020-12-19 RX ADMIN — DULOXETINE HYDROCHLORIDE SCH MG: 30 CAPSULE, DELAYED RELEASE ORAL at 20:53

## 2020-12-19 RX ADMIN — ASPIRIN SCH MG: 81 TABLET ORAL at 09:19

## 2020-12-19 RX ADMIN — METOCLOPRAMIDE SCH MG: 5 INJECTION, SOLUTION INTRAMUSCULAR; INTRAVENOUS at 20:53

## 2020-12-19 RX ADMIN — DULOXETINE HYDROCHLORIDE SCH MG: 30 CAPSULE, DELAYED RELEASE ORAL at 09:19

## 2020-12-19 RX ADMIN — PANTOPRAZOLE SODIUM SCH MG: 40 TABLET, DELAYED RELEASE ORAL at 09:19

## 2020-12-19 RX ADMIN — SUCRALFATE SCH GM: 1 SUSPENSION ORAL at 20:52

## 2020-12-19 RX ADMIN — SUCRALFATE SCH GM: 1 SUSPENSION ORAL at 13:14

## 2020-12-19 RX ADMIN — SODIUM CHLORIDE SCH UNITS: 4.5 INJECTION, SOLUTION INTRAVENOUS at 20:53

## 2020-12-19 RX ADMIN — INSULIN DETEMIR SCH UNITS: 100 INJECTION, SOLUTION SUBCUTANEOUS at 09:21

## 2020-12-19 RX ADMIN — DEXTROSE AND SODIUM CHLORIDE SCH MLS/HR: 5; 450 INJECTION, SOLUTION INTRAVENOUS at 09:23

## 2020-12-19 RX ADMIN — SODIUM CHLORIDE SCH UNITS: 4.5 INJECTION, SOLUTION INTRAVENOUS at 13:15

## 2020-12-19 NOTE — IPNPDOC
Date Seen


The patient was seen on 12/19/20.





Progress Note


SUBJECTIVE: 


Surgery evaluated patient, scope 12/20/20 to r/o ulcer. No significant 

improvement with sucralfate. Eating very little do to pain in epigastric . NPO 

after midnight. Denies chest pain, incr SOB, fevers, chills. 





OBJECTIVE: 





PHYSICAL EXAMINATION: 


VS: please see below 


CONSTITUTIONAL: No acute distress, AAO x 3


EYES: PERRLA, EOM intact


HENT, MOUTH: Normocephalic, atraumatic, dry mucous membranes


NECK: SUPPLE, no JVD, no lymphadenopathy, no carotid bruit


CV: Regular rate and rhythm, S1S2 normal, no murmurs/rubs/gallops


RESPIRATORY: Clear to auscultation bilaterally, no rales/rhonchi/wheezes


GI:  abdominal tenderness localized to epigastric area , BS positive in 4 

quadrants, soft, nondistended, no rebound or guarding, no organomegaly


: Deferred


MUSCULOSKELETAL: Normal ROM. No cyanosis, clubbing, swelling, joint deformity, 

extremity edema


INTEGUMENTARY:  Intact, no rashes, no lesions, no erythema


NEUROLOGIC: Cranial Nerves II-XII are intact, no focal deficits


PSYCHIATRIC: Mood and affect are normal 





LABORATORY DATA: 


Please see below 





IMAGING: 





CT abd/pelvis without contrast: 


There is a small focal density within a small bowel loop on the left, likely an 

ingested tablet or capsule. No bowel distention or obstruction.  No focal fluid 

collection to suggest abscess or hematoma.  No ascites.  No mass or adenopathy. 

No pneumoperitoneum. Otherwise, essentially negative CT of the abdomen and 

pelvis.





CXR: 


No active disease 





ASSESSMENT: Patient is a 61 y/o M with PMH of IDDM Type I with hx recurrent 

admissions (last 8/2020) for DKA, hx of c. diff (8/2020), HTN, HLD, Depression 

and chronic back pain admitted by medicine service to the ICU for continued avery

atment of moderate diabetic ketoacidosis requiring insulin drip.





PLAN:  





Nausea/vomiting with abdominal pain poss 2/2 to gastroparesis, cannot r/o 

gastric ulcer? 


-Very poor appetite due to abdominal pain. Tenderness focused mostly in 

epigastric area, worsened by eating 


-Patient states he is being actively worked up by GI and is scheduled to have 

scope mid-January 2021. 


-CT abd pelvis: No acute issues.


-NPO after midnight for scope to be done by surgery to r/o ulcer 12/20/20


-C/w reglan AC, started sucralfate, zofran PRN


-Consistent carb diet, D5W 75 cc/hr


-Surgery consulted





Uncontrolled DM- resolved DKA 


-Hx of recurrent admissions (last 8/2020) for DKA


- this AM


-Last HbA1c > 10 in 8/2020. Last lipid panel 2012 showed TG >500, cholesterol 

elevated 


-F/u new HbA1c, lipid panel


-D/c D5W, start on NSS as he will be NPO after midnight 


-Levemir incr to 30 U SC QAM, ISS, FS AC/HS, consistent carb diet


-N/v: reglan TID, zofran PRN


-Daily labs





HTN


-BP uncontrolled. 


-Restarted ACEi this AM 





HLD


-last lipid panel 2012: TG >500, High LDL, cholesterol incr 


-C/w statin





Depression


-Stable


-C/w home duloxetine 





Chronic back pain


-Stable 


-C/w tylenol PRN 





Hx of C. diff


-Diagnosed in 8/2020


-Treated with PO vancomycin as o/p, followed with Dr. Roper


-No diarrhea in 1 week, last BM was formed


-Try to avoid abx or incr PPI if possible 


-Probiotic with meals when resume PO diet 





Insomnia 


-C/w zolpidem 





GI px 


-PPI 





DVT px 


-Heparin SC 





Resolved issues: 


Acute kidney injury likely 2/2 prerenal cause, dehydration


Hypophosphatemia, acute 





DISPOSITION: Scope in the AM by surgery to r/o ulcer. Plan is discharge home 

when medically improved.





VS, I&O, 24H, Fishbone


Vital Signs/I&O





Vital Signs








  Date Time  Temp Pulse Resp B/P (MAP) Pulse Ox O2 Delivery O2 Flow Rate FiO2


 


12/19/20 14:00 97.4 92 18 120/65 (83) 99 Room Air  














I&O- Last 24 Hours up to 6 AM 


 


 12/19/20





 06:00


 


Intake Total 3870 ml


 


Output Total 2945 ml


 


Balance 925 ml











Laboratory Data


24H LABS


Laboratory Tests 2


12/18/20 17:21: Bedside Glucose (Misc Panel) 124H


12/18/20 21:26: Bedside Glucose (Misc Panel) 127H


12/19/20 05:47: 


Nucleated Red Blood Cells % (auto) 0.0, Anion Gap 5L, Glomerular Filtration Rate

> 60.0, Calcium Level 8.4L, Total Bilirubin 0.6, Aspartate Amino Transf 

(AST/SGOT) 11, Alanine Aminotransferase (ALT/SGPT) 23, Alkaline Phosphatase 58, 

Total Protein 5.4L, Albumin 3.3, Albumin/Globulin Ratio 1.6


12/19/20 06:47: Bedside Glucose (Misc Panel) 272H


12/19/20 11:52: Bedside Glucose (Misc Panel) 320H


CBC/BMP


Laboratory Tests


12/19/20 05:47











Current Medications





Current Medications








 Medications


  (Trade)  Dose


 Ordered  Sig/Chen


 Route


 PRN Reason  Start Time


 Stop Time Status Last Admin


Dose Admin


 


 Acetaminophen


  (Tylenol Tab)  650 mg  Q6HP  PRN


 PO


 PAIN / FEVER  12/16/20 18:30


    12/18/20 18:46





 


 Aspirin


  (Ecotrin)  81 mg  DAILY


 PO


   12/17/20 09:00


    12/19/20 09:19





 


 Atorvastatin


 Calcium


  (Lipitor)  20 mg  DAILY


 PO


   12/17/20 09:00


    12/19/20 09:19





 


 Ceftriaxone


 Sodium 1 gm/


 Dextrose  50 ml @ 


 100 mls/hr  Q24H


 IV


   12/16/20 20:00


 12/16/20 18:21 DC  





 


 Dextrose


  (Dextrose 50%)  25 ml  ASDIRECTED  PRN


 IV


 SEE LABEL COMMENTS  12/17/20 11:45


     





 


 Dextrose/Sodium


 Chloride  1,000 ml @ 


 75 mls/hr  [id number]


 IV


   12/17/20 00:30


    12/19/20 09:23





 


 Duloxetine HCl


  (Cymbalta)  30 mg  QHS


 PO


   12/19/20 21:00


     





 


 Duloxetine HCl


  (Cymbalta)  60 mg  DAILY


 PO


   12/17/20 09:00


    12/19/20 09:19





 


 Glucagon


  (Glucagon)  1 mg  ASDIRECTED  PRN


 SC


 SEE LABEL COMMENTS  12/17/20 11:45


     





 


 Glucose


  (Glucose)  16 GM  ASDIRECTED  PRN


 PO


 SEE LABEL COMMENTS  12/17/20 11:45


     





 


 Heparin Sodium


  (Porcine)


  (Heparin)  5,000 units  Q8H


 SC


   12/16/20 22:00


    12/19/20 13:15





 


 Home Med


  (Med Rec


 Complete!)    ASDIRECTED


 XX


   12/16/20 18:00


 12/16/20 17:50 DC  





 


 Influenza Virus


 Vaccine


  (Flublok


 Quad(Egg-Free)18y&Older


 Influenza)  0.5 ml  ASDIRECTED


 IM


   12/17/20 07:30


     





 


 Insulin Detemir


  (Levemir Insulin)  25 units  QAM


 SC


   12/17/20 09:00


 12/19/20 09:54 DC 12/19/20 09:21





 


 Insulin Detemir


  (Levemir Insulin)  30 units  QAM


 SC


   12/20/20 09:00


     





 


 Insulin Human


 Lispro


  (HumaLOG INSULIN)  SEE


 PROTOCOL


 TABLE  AC


 SC


   12/17/20 12:00


    12/19/20 13:14





 


 Insulin Human


 Lispro


  (HumaLOG INSULIN)  SEE


 PROTOCOL


 TABLE  QHS


 SC


   12/17/20 21:00


     





 


 Insulin Human


 Regular 100 unit/


 IV Miscellaneous


 Supplies  100 ml @ 0


 mls/hr  Q0M


 IV


   12/16/20 17:50


 12/17/20 11:53 DC 12/17/20 00:12





 


 Insulin Human


 Regular 100 unit/


 IV Miscellaneous


 Supplies  100 ml @ 7


 mls/hr  [id number]


 IV


   12/16/20 17:01


 12/16/20 18:10 DC 12/16/20 17:25





 


 Ketorolac


 Tromethamine


  (ToRADol)  15 mg  Q8H


 IV


   12/17/20 16:00


 12/22/20 15:59  12/19/20 09:21





 


 Lisinopril


  (Prinivil)  5 mg  DAILY


 PO


   12/19/20 09:00


    12/19/20 10:17





 


 Metoclopramide HCl


  (REGLAN


 INJection)  10 mg  TID


 IV


   12/17/20 16:00


    12/19/20 09:19





 


 Metoclopramide HCl


  (Reglan)  5 mg  AC


 PO


   12/17/20 17:30


 12/17/20 14:53 DC  





 


 Metoclopramide HCl


  (Reglan)  10 mg  AC


 PO


   12/17/20 12:00


 12/17/20 12:02 DC 12/17/20 11:59





 


 Morphine Sulfate


  (Morphine


 Sulfate Inj)  1 mg  Q6H  PRN


 IV


 SEVERE PAIN (PS 8-10)  12/17/20 15:00


     





 


 Non-Formulary


 Medication


  (Insulin Iv Rate


 Change


 Documentation ml/


 Hr)    ASDIRECTED


 XX


   12/16/20 17:15


 12/16/20 18:10 DC  





 


 Non-Formulary


 Medication


  (Insulin Iv Rate


 Change


 Documentation ml/


 Hr)    ASDIRECTED


 XX


   12/16/20 18:00


 12/17/20 11:53 DC 12/17/20 03:54





 


 Ondansetron HCl


  (ZOFRAN


 INJection)  4 mg  Q6HP  PRN


 IV


 NAUSEA OR VOMITING  12/16/20 18:30


    12/17/20 09:06





 


 Pantoprazole


 Sodium


  (Protonix)  40 mg  DAILY


 IV


   12/17/20 09:00


 12/17/20 08:51 DC  





 


 Pantoprazole


 Sodium


  (Protonix)  40 mg  QAM


 PO


   12/17/20 09:00


    12/19/20 09:19





 


 Sodium Chloride  1,000 ml @ 


 125 mls/hr  Q8H


 IV


   12/16/20 17:50


 12/17/20 00:26 DC 12/16/20 18:18





 


 Sodium Chloride  1,000 ml @ 


 150 mls/hr  Q6H40M


 IV


   12/16/20 17:44


 12/17/20 00:05 DC 12/16/20 18:09





 


 Sucralfate


  (Carafate


 Suspension)  1 gm  ACHS


 PO


   12/18/20 07:30


    12/19/20 13:14





 


 Zolpidem Tartrate


  (Ambien)  10 mg  QHS


 PO


   12/16/20 21:00


    12/18/20 23:03














Allergies


Coded Allergies:  


     pregabalin (Verified  Adverse Reaction, Unknown, LEG SWELLING, 12/16/20)











Erika Gaming MD            Dec 19, 2020 16:32

## 2020-12-20 VITALS — DIASTOLIC BLOOD PRESSURE: 72 MMHG | SYSTOLIC BLOOD PRESSURE: 122 MMHG

## 2020-12-20 VITALS — SYSTOLIC BLOOD PRESSURE: 160 MMHG | DIASTOLIC BLOOD PRESSURE: 85 MMHG

## 2020-12-20 VITALS — SYSTOLIC BLOOD PRESSURE: 148 MMHG | DIASTOLIC BLOOD PRESSURE: 80 MMHG

## 2020-12-20 VITALS — SYSTOLIC BLOOD PRESSURE: 166 MMHG | DIASTOLIC BLOOD PRESSURE: 92 MMHG

## 2020-12-20 VITALS — SYSTOLIC BLOOD PRESSURE: 132 MMHG | DIASTOLIC BLOOD PRESSURE: 79 MMHG

## 2020-12-20 VITALS — SYSTOLIC BLOOD PRESSURE: 148 MMHG | DIASTOLIC BLOOD PRESSURE: 64 MMHG

## 2020-12-20 VITALS — DIASTOLIC BLOOD PRESSURE: 84 MMHG | SYSTOLIC BLOOD PRESSURE: 171 MMHG

## 2020-12-20 VITALS — SYSTOLIC BLOOD PRESSURE: 77 MMHG | DIASTOLIC BLOOD PRESSURE: 40 MMHG

## 2020-12-20 VITALS — DIASTOLIC BLOOD PRESSURE: 80 MMHG | SYSTOLIC BLOOD PRESSURE: 136 MMHG

## 2020-12-20 VITALS — DIASTOLIC BLOOD PRESSURE: 76 MMHG | SYSTOLIC BLOOD PRESSURE: 118 MMHG

## 2020-12-20 VITALS — DIASTOLIC BLOOD PRESSURE: 100 MMHG | SYSTOLIC BLOOD PRESSURE: 180 MMHG

## 2020-12-20 VITALS — DIASTOLIC BLOOD PRESSURE: 90 MMHG | SYSTOLIC BLOOD PRESSURE: 170 MMHG

## 2020-12-20 LAB
ALBUMIN SERPL BCG-MCNC: 3.6 GM/DL (ref 3.2–5.2)
ALT SERPL W P-5'-P-CCNC: 27 U/L (ref 12–78)
BILIRUB SERPL-MCNC: 0.5 MG/DL (ref 0.2–1)
BUN SERPL-MCNC: 8 MG/DL (ref 7–18)
CALCIUM SERPL-MCNC: 8.8 MG/DL (ref 8.5–10.1)
CHLORIDE SERPL-SCNC: 105 MEQ/L (ref 98–107)
CO2 SERPL-SCNC: 28 MEQ/L (ref 21–32)
CREAT SERPL-MCNC: 0.66 MG/DL (ref 0.7–1.3)
GFR SERPL CREATININE-BSD FRML MDRD: > 60 ML/MIN/{1.73_M2} (ref 56–?)
GLUCOSE SERPL-MCNC: 250 MG/DL (ref 70–100)
HCT VFR BLD AUTO: 35.4 % (ref 42–52)
HCT VFR BLD AUTO: 35.5 % (ref 42–52)
HGB BLD-MCNC: 11.8 G/DL (ref 13.5–17.5)
HGB BLD-MCNC: 12 G/DL (ref 13.5–17.5)
MCH RBC QN AUTO: 27.9 PG (ref 27–33)
MCH RBC QN AUTO: 28.4 PG (ref 27–33)
MCHC RBC AUTO-ENTMCNC: 33.2 G/DL (ref 32–36.5)
MCHC RBC AUTO-ENTMCNC: 33.9 G/DL (ref 32–36.5)
MCV RBC AUTO: 83.9 FL (ref 80–96)
MCV RBC AUTO: 83.9 FL (ref 80–96)
PLATELET # BLD AUTO: 219 10^3/UL (ref 150–450)
PLATELET # BLD AUTO: 239 10^3/UL (ref 150–450)
POTASSIUM SERPL-SCNC: 3.8 MEQ/L (ref 3.5–5.1)
PROT SERPL-MCNC: 6 GM/DL (ref 6.4–8.2)
RBC # BLD AUTO: 4.22 10^6/UL (ref 4.3–6.1)
RBC # BLD AUTO: 4.23 10^6/UL (ref 4.3–6.1)
SODIUM SERPL-SCNC: 139 MEQ/L (ref 136–145)
WBC # BLD AUTO: 3.8 10^3/UL (ref 4–10)
WBC # BLD AUTO: 4.6 10^3/UL (ref 4–10)

## 2020-12-20 RX ADMIN — ACETAMINOPHEN PRN MG: 325 TABLET ORAL at 18:06

## 2020-12-20 RX ADMIN — METOCLOPRAMIDE SCH MG: 5 INJECTION, SOLUTION INTRAMUSCULAR; INTRAVENOUS at 20:16

## 2020-12-20 RX ADMIN — KETOROLAC TROMETHAMINE SCH MG: 30 INJECTION, SOLUTION INTRAMUSCULAR at 08:00

## 2020-12-20 RX ADMIN — INSULIN LISPRO SCH UNITS: 100 INJECTION, SOLUTION INTRAVENOUS; SUBCUTANEOUS at 13:40

## 2020-12-20 RX ADMIN — PANTOPRAZOLE SODIUM SCH MG: 40 TABLET, DELAYED RELEASE ORAL at 08:58

## 2020-12-20 RX ADMIN — SODIUM CHLORIDE SCH UNITS: 4.5 INJECTION, SOLUTION INTRAVENOUS at 05:02

## 2020-12-20 RX ADMIN — SUCRALFATE SCH GM: 1 SUSPENSION ORAL at 13:40

## 2020-12-20 RX ADMIN — ASPIRIN SCH MG: 81 TABLET ORAL at 08:58

## 2020-12-20 RX ADMIN — SUCRALFATE SCH GM: 1 SUSPENSION ORAL at 16:49

## 2020-12-20 RX ADMIN — SUCRALFATE SCH GM: 1 SUSPENSION ORAL at 08:58

## 2020-12-20 RX ADMIN — KETOROLAC TROMETHAMINE SCH MG: 30 INJECTION, SOLUTION INTRAMUSCULAR at 23:59

## 2020-12-20 RX ADMIN — KETOROLAC TROMETHAMINE SCH MG: 30 INJECTION, SOLUTION INTRAMUSCULAR at 16:00

## 2020-12-20 RX ADMIN — INSULIN LISPRO SCH UNITS: 100 INJECTION, SOLUTION INTRAVENOUS; SUBCUTANEOUS at 19:49

## 2020-12-20 RX ADMIN — DULOXETINE HYDROCHLORIDE SCH MG: 30 CAPSULE, DELAYED RELEASE ORAL at 20:17

## 2020-12-20 RX ADMIN — SODIUM CHLORIDE SCH UNITS: 4.5 INJECTION, SOLUTION INTRAVENOUS at 20:17

## 2020-12-20 RX ADMIN — INSULIN DETEMIR SCH UNITS: 100 INJECTION, SOLUTION SUBCUTANEOUS at 08:57

## 2020-12-20 RX ADMIN — INSULIN LISPRO SCH UNITS: 100 INJECTION, SOLUTION INTRAVENOUS; SUBCUTANEOUS at 07:30

## 2020-12-20 RX ADMIN — SODIUM CHLORIDE SCH UNITS: 4.5 INJECTION, SOLUTION INTRAVENOUS at 14:11

## 2020-12-20 RX ADMIN — METOCLOPRAMIDE SCH MG: 5 INJECTION, SOLUTION INTRAMUSCULAR; INTRAVENOUS at 08:57

## 2020-12-20 RX ADMIN — METOCLOPRAMIDE SCH MG: 5 INJECTION, SOLUTION INTRAMUSCULAR; INTRAVENOUS at 16:49

## 2020-12-20 RX ADMIN — INSULIN LISPRO SCH UNITS: 100 INJECTION, SOLUTION INTRAVENOUS; SUBCUTANEOUS at 18:01

## 2020-12-20 RX ADMIN — ATORVASTATIN CALCIUM SCH MG: 20 TABLET, FILM COATED ORAL at 08:59

## 2020-12-20 RX ADMIN — DULOXETINE HYDROCHLORIDE SCH MG: 30 CAPSULE, DELAYED RELEASE ORAL at 08:58

## 2020-12-20 RX ADMIN — SUCRALFATE SCH GM: 1 SUSPENSION ORAL at 20:17

## 2020-12-20 NOTE — CR
CONSULTATION



DATE:  12/19/2020



BRIEF HISTORY OF PRESENT ILLNESS: Patient is a 60-year-old diabetic male with

brittle diabetes who has had some problems with epigastric pain ever since he

had an episode of C. difficile colitis and since that time he has had poor oral

intake and has had problems with DKA. He is currently re-admitted for DKA and I

am asked to see him for his epigastric pain. In addition he has a planned

outpatient visit with Dr. Franklin early January for epigastric pain work-up/EGD.

The Hospitalist is asking me to proceed with an endoscopy earlier to rule out

ulcer, gastroparesis, etc. He has not had any blood per rectum; does have

nausea, does have vomiting at times, non-bloody. Has no fevers or chills, no

diarrhea, decreased bowel movements. 



PAST MEDICAL AND SURGICAL HISTORY: Significant for:

1.  Type-1 diabetes mellitus with admissions for DKA. 

2.  Hypertension. 

3.  Hyperlipidemia. 

4.  Depression.

5.  Chronic back pain. 

6.  History of C. diff. 

7.  History of bilateral hernia repair.

8.  History of laminectomy.

9.  History of smoking.



MEDICATIONS: Include:

1.  Aspirin. 

2.  Atorvastatin.

3.  Cymbalta. 

4.  Duloxetine.

5.  insulin

6.  Lisinopril. 

7.  Zolpidem.

8.  Tylenol #3.

9.  Zofran. 



PHYSICAL EXAMINATION: Exam reveals a 60-year-old male who looks older than

stated age.

HEENT: Unremarkable. 

Neck: Supple without adenopathy.

Lungs: Clear to auscultation. 

Heart: Regular. 

Abdomen: Soft, nondistended, mildly tender to deep palpation in the epigastric

area without guarding, rebound or peritoneal signs. 



IMPRESSION AND PLAN: Patient has epigastric pain most likely secondary to

gastritis as the mostly likely etiology. I do feel that he needs to be on a

proton pump inhibitor twice a day, Carafate four times a day. It is less likely

that it is associated with gastroparesis, but it is also a possibility at this

time and we will evaluate that as well to see if there is any food/bezoar;

however, this was not apparent on his CT scan and did not reveal a dilated

stomach or evidence of food within the stomach itself. I am not seeing any

significant hiatal hernia and no evidence of small bowel obstruction. 



Patient has most likely gastritis/peptic ulcer disease as his primary etiology.

At this point I recommend twice a day Protonix, Carafate four times a day. may

need to stop his aspirin for a short period of time as well as continue with

Reglan is a reasonable option for now although he tried a low dose as an

outpatient, did not have any side effects with this it may be more reasonable

to add as you have done the Reglan 10 mg. we will plan on EGD tomorrow, will

keep him n.p.o. after midnight. Risks as well as benefits have been discussed

with the patient and he agrees to proceed with intervention.

## 2020-12-20 NOTE — IPNPDOC
Date Seen


The patient was seen on 12/20/20.





Progress Note


SUBJECTIVE: 


OR today for scope, found no delayed emptying but say gastritis in proximal 

stomach and irritation in distal esophagitis that appeared to be healing. No 

hypoglycemic episodes. Denies chest pain, incr SOB, fevers, chills. 





OBJECTIVE: 





PHYSICAL EXAMINATION: 


VS: please see below 


CONSTITUTIONAL: appears uncomfortable but AAO x 3


EYES: PERRLA, EOM intact


HENT, MOUTH: Normocephalic, atraumatic, dry mucous membranes


NECK: SUPPLE, no JVD, no lymphadenopathy, no carotid bruit


CV: Regular rate and rhythm, S1S2 normal, no murmurs/rubs/gallops


RESPIRATORY: Clear to auscultation bilaterally, no rales/rhonchi/wheezes


GI:  abdominal tenderness localized to epigastric area , BS positive in 4 

quadrants, soft, nondistended, no rebound or guarding, no organomegaly


: Deferred


MUSCULOSKELETAL: Normal ROM. No cyanosis, clubbing, swelling, joint deformity, 

extremity edema


INTEGUMENTARY:  Intact, no rashes, no lesions, no erythema


NEUROLOGIC: Cranial Nerves II-XII are intact, no focal deficits


PSYCHIATRIC: Mood and affect are normal 





LABORATORY DATA: 


Please see below 





IMAGING: 





CT abd/pelvis without contrast: 


There is a small focal density within a small bowel loop on the left, likely an 

ingested tablet or capsule. No bowel distention or obstruction.  No focal fluid 

collection to suggest abscess or hematoma.  No ascites.  No mass or adenopathy. 

No pneumoperitoneum. Otherwise, essentially negative CT of the abdomen and 

pelvis.





CXR: 


No active disease 





ASSESSMENT: Patient is a 59 y/o M with PMH of IDDM Type I with hx recurrent 

admissions (last 8/2020) for DKA, hx of c. diff (8/2020), HTN, HLD, Depression 

and chronic back pain admitted by medicine service to the ICU for continued 

treatment of moderate diabetic ketoacidosis requiring insulin drip.





PLAN:  





Nausea/vomiting with abdominal pain poss 2/2 to gastroparesis, gastritis, 

esophagitis 


-Very poor appetite due to abdominal pain. Tenderness focused mostly in 

epigastric area, worsened by eating 


-Patient states he is being actively worked up by GI and is scheduled to have 

scope mid-January 2021. 


-CT abd pelvis: No acute issues.


-Endoscopy done today:  no delayed emptying but say gastritis in proximal st

omach and irritation in distal esophagitis that appeared to be healing.


-GI appt in several weeks can f/u path from scope


-C/w consistent carb diet, reglan AC, started sucralfate, zofran PRN, incr PPI 

to BID 


-H. pylori ordered 


-Surgery following 





Uncontrolled DM- resolved DKA 


-Hx of recurrent admissions (last 8/2020) for DKA


-'s


-Last HbA1c > 10 in 8/2020. Last lipid panel 2012 showed TG >500, cholesterol 

elevated 


-HbA1c 10.7, lipid panel unremarkable


-Consistent carb diet


-Levemir  QAM, ISS, FS AC/HS, consistent carb diet


-N/v: reglan TID, zofran PRN


-Daily labs





HTN


-BP uncontrolled. 


-Restarted ACEi





HLD


-lipid panel unremarkable 


-C/w statin





Depression


-Stable


-C/w home duloxetine 





Chronic back pain


-Stable 


-C/w tylenol PRN 





Hx of C. diff


-Diagnosed in 8/2020


-Treated with PO vancomycin as o/p, followed with Dr. Roper


-No diarrhea in > 1 week, last BM was formed


-Try to avoid abx 


-Probiotic with meals 





Insomnia 


-C/w zolpidem 





GI px 


-PPI incr to BID 





DVT px 


-Heparin SC 





Resolved issues: 


Acute kidney injury likely 2/2 prerenal cause, dehydration


Hypophosphatemia, acute 





DISPOSITION: Plan is to resume diet, see how he does in AM. Unfortunately will 

not be able to completely take away discomfort with eating per surgery. He will 

need to f/u with GI after discharge and c/w current meds. Hoping for d/c in the 

AM.





VS, I&O, 24H, Fishbone


Vital Signs/I&O





Vital Signs








  Date Time  Temp Pulse Resp B/P (MAP) Pulse Ox O2 Delivery O2 Flow Rate FiO2


 


12/20/20 16:05 98.7 90 18 118/76 (90) 100 Room Air  














I&O- Last 24 Hours up to 6 AM 


 


 12/20/20





 06:00


 


Intake Total 2280 ml


 


Output Total 6025 ml


 


Balance -3745 ml











Laboratory Data


24H LABS


Laboratory Tests 2


12/19/20 17:21: 


Estimated Mean Plasma Glucose 260H, Hemoglobin A1c 10.7, Triglycerides Level 

115, Total Cholesterol 160, LDL Cholesterol 99, Non-HDL Cholesterol (LDL + VLDL)

122, Total HDL Cholesterol 38L, Cholesterol/HDL Ratio 4.210


12/19/20 20:20: Bedside Glucose (Misc Panel) 73


12/20/20 01:04: Bedside Glucose (Misc Panel) 127H


12/20/20 05:52: 


Nucleated Red Blood Cells % (auto) 0.0, Anion Gap 6L, Glomerular Filtration Rate

> 60.0, Calcium Level 8.8, Total Bilirubin 0.5, Aspartate Amino Transf 

(AST/SGOT) 16, Alanine Aminotransferase (ALT/SGPT) 27, Alkaline Phosphatase 70, 

Total Protein 6.0L, Albumin 3.6, Albumin/Globulin Ratio 1.5


12/20/20 08:35: Nucleated Red Blood Cells % (auto) 0.0


12/20/20 12:30: Bedside Glucose (Misc Panel) 307H


12/20/20 16:12: Bedside Glucose (Misc Panel) 157H


CBC/BMP


Laboratory Tests


12/20/20 05:52








12/20/20 08:35











Current Medications





Current Medications








 Medications


  (Trade)  Dose


 Ordered  Sig/Chen


 Route


 PRN Reason  Start Time


 Stop Time Status Last Admin


Dose Admin


 


 Acetaminophen


  (Tylenol Tab)  650 mg  Q6HP  PRN


 PO


 PAIN / FEVER  12/16/20 18:30


    12/18/20 18:46





 


 Aspirin


  (Ecotrin)  81 mg  DAILY


 PO


   12/17/20 09:00


    12/20/20 08:58





 


 Atorvastatin


 Calcium


  (Lipitor)  20 mg  DAILY


 PO


   12/17/20 09:00


 12/19/20 16:24 DC 12/19/20 09:19





 


 Atorvastatin


 Calcium


  (Lipitor)  20 mg  DAILY


 PO


   12/20/20 09:00


    12/20/20 08:59





 


 Atorvastatin


 Calcium


  (Lipitor)  40 mg  DAILY


 PO


   12/20/20 09:00


 12/19/20 16:26 DC  





 


 Ceftriaxone


 Sodium 1 gm/


 Dextrose  50 ml @ 


 100 mls/hr  Q24H


 IV


   12/16/20 20:00


 12/16/20 18:21 DC  





 


 Dextrose


  (Dextrose 50%)  25 ml  ASDIRECTED  PRN


 IV


 SEE LABEL COMMENTS  12/17/20 11:45


     





 


 Dextrose/Sodium


 Chloride  1,000 ml @ 


 75 mls/hr  [id number]


 IV


   12/17/20 00:30


 12/19/20 16:28 DC 12/19/20 09:23





 


 Duloxetine HCl


  (Cymbalta)  30 mg  QHS


 PO


   12/19/20 21:00


    12/19/20 20:53





 


 Duloxetine HCl


  (Cymbalta)  60 mg  DAILY


 PO


   12/17/20 09:00


    12/20/20 08:58





 


 Fenofibrate


  (Tricor)  145 mg  DAILY


 PO


   12/20/20 09:00


 12/19/20 16:26 DC  





 


 Glucagon


  (Glucagon)  1 mg  ASDIRECTED  PRN


 SC


 SEE LABEL COMMENTS  12/17/20 11:45


     





 


 Glucose


  (Glucose)  16 GM  ASDIRECTED  PRN


 PO


 SEE LABEL COMMENTS  12/17/20 11:45


     





 


 Heparin Sodium


  (Porcine)


  (Heparin)  5,000 units  Q8H


 SC


   12/16/20 22:00


    12/20/20 14:11





 


 Home Med


  (Med Rec


 Complete!)    ASDIRECTED


 XX


   12/16/20 18:00


 12/16/20 17:50 DC  





 


 Influenza Virus


 Vaccine


  (Flublok


 Quad(Egg-Free)18y&Older


 Influenza)  0.5 ml  ASDIRECTED


 IM


   12/17/20 07:30


     





 


 Insulin Detemir


  (Levemir Insulin)  18 units  QAM


 SC


   12/20/20 09:00


    12/20/20 08:57





 


 Insulin Detemir


  (Levemir Insulin)  25 units  QAM


 SC


   12/17/20 09:00


 12/19/20 09:54 DC 12/19/20 09:21





 


 Insulin Detemir


  (Levemir Insulin)  30 units  QAM


 SC


   12/20/20 09:00


 12/20/20 08:25 DC  





 


 Insulin Human


 Lispro


  (HumaLOG INSULIN)  SEE


 PROTOCOL


 TABLE  AC


 SC


   12/17/20 12:00


    12/19/20 17:27





 


 Insulin Human


 Lispro


  (HumaLOG INSULIN)  SEE


 PROTOCOL


 TABLE  QHS


 SC


   12/17/20 21:00


     





 


 Insulin Human


 Regular 100 unit/


 IV Miscellaneous


 Supplies  100 ml @ 0


 mls/hr  Q0M


 IV


   12/16/20 17:50


 12/17/20 11:53 DC 12/17/20 00:12





 


 Insulin Human


 Regular 100 unit/


 IV Miscellaneous


 Supplies  100 ml @ 7


 mls/hr  [id number]


 IV


   12/16/20 17:01


 12/16/20 18:10 DC 12/16/20 17:25





 


 Ketorolac


 Tromethamine


  (ToRADol)  15 mg  Q8H


 IV


   12/17/20 16:00


 12/22/20 15:59  12/19/20 09:21





 


 Lactated Ringer's  1,000 ml @ 


 100 mls/hr  Q10H


 IV


   12/20/20 13:00


 12/20/20 14:00 DC  





 


 Lisinopril


  (Prinivil)  5 mg  DAILY


 PO


   12/19/20 09:00


    12/20/20 08:58





 


 Metoclopramide HCl


  (REGLAN


 INJection)  10 mg  Q6HP  PRN


 IV


 NAUSEA OR VOMITING  12/20/20 13:00


 12/20/20 14:00 DC  





 


 Metoclopramide HCl


  (REGLAN


 INJection)  10 mg  TID


 IV


   12/17/20 16:00


    12/20/20 08:57





 


 Metoclopramide HCl


  (Reglan)  5 mg  AC


 PO


   12/17/20 17:30


 12/17/20 14:53 DC  





 


 Metoclopramide HCl


  (Reglan)  10 mg  AC


 PO


   12/17/20 12:00


 12/17/20 12:02 DC 12/17/20 11:59





 


 Morphine Sulfate


  (Morphine


 Sulfate Inj)  1 mg  Q6H  PRN


 IV


 SEVERE PAIN (PS 8-10)  12/17/20 15:00


     





 


 Non-Formulary


 Medication


  (Insulin Iv Rate


 Change


 Documentation ml/


 Hr)    ASDIRECTED


 XX


   12/16/20 17:15


 12/16/20 18:10 DC  





 


 Non-Formulary


 Medication


  (Insulin Iv Rate


 Change


 Documentation ml/


 Hr)    ASDIRECTED


 XX


   12/16/20 18:00


 12/17/20 11:53 DC 12/17/20 03:54





 


 Ondansetron HCl


  (ZOFRAN


 INJection)  4 mg  Q4HP  PRN


 IV


 NAUSEA OR VOMITING  12/20/20 13:00


 12/20/20 14:00 DC  





 


 Ondansetron HCl


  (ZOFRAN


 INJection)  4 mg  Q6HP  PRN


 IV


 NAUSEA OR VOMITING  12/16/20 18:30


    12/17/20 09:06





 


 Pantoprazole


 Sodium


  (Protonix)  40 mg  DAILY


 IV


   12/17/20 09:00


 12/17/20 08:51 DC  





 


 Pantoprazole


 Sodium


  (Protonix)  40 mg  QAM


 PO


   12/17/20 09:00


    12/20/20 08:58





 


 Sodium Chloride  1,000 ml @ 


 75 mls/hr  [id number]


 IV


   12/19/20 17:00


 12/20/20 06:59 DC 12/19/20 17:27





 


 Sodium Chloride  1,000 ml @ 


 125 mls/hr  Q8H


 IV


   12/16/20 17:50


 12/17/20 00:26 DC 12/16/20 18:18





 


 Sodium Chloride  1,000 ml @ 


 150 mls/hr  Q6H40M


 IV


   12/16/20 17:44


 12/17/20 00:05 DC 12/16/20 18:09





 


 Sucralfate


  (Carafate


 Suspension)  1 gm  ACHS


 PO


   12/18/20 07:30


    12/20/20 08:58





 


 Zolpidem Tartrate


  (Ambien)  10 mg  QHS


 PO


   12/16/20 21:00


    12/19/20 20:53














Allergies


Coded Allergies:  


     pregabalin (Verified  Adverse Reaction, Unknown, LEG SWELLING, 12/16/20)











Erika Gaming MD            Dec 20, 2020 16:51

## 2020-12-20 NOTE — IPN
PROGRESS NOTE



DATE:  12/20/2020



Patient overnight has been stable, not complaining of any significant abdominal

pain although he states that he is still at baseline feeling some epigastric

discomfort and some nausea.  He has had no fevers or chills this morning and

his white count has been fine and hematocrit has been stable.



His abdomen is soft, nontender, nondistended although he does have some mild

discomfort in the epigastric area without significant rebound or peritoneal

sign.  



The plan is to proceed with an EGD today.  Risks as well as benefits have been

discussed with him at length and he agrees to proceed with this here today.

## 2020-12-21 VITALS — DIASTOLIC BLOOD PRESSURE: 53 MMHG | SYSTOLIC BLOOD PRESSURE: 84 MMHG

## 2020-12-21 VITALS — DIASTOLIC BLOOD PRESSURE: 68 MMHG | SYSTOLIC BLOOD PRESSURE: 119 MMHG

## 2020-12-21 VITALS — SYSTOLIC BLOOD PRESSURE: 142 MMHG | DIASTOLIC BLOOD PRESSURE: 84 MMHG

## 2020-12-21 VITALS — SYSTOLIC BLOOD PRESSURE: 91 MMHG | DIASTOLIC BLOOD PRESSURE: 60 MMHG

## 2020-12-21 VITALS — SYSTOLIC BLOOD PRESSURE: 149 MMHG | DIASTOLIC BLOOD PRESSURE: 95 MMHG

## 2020-12-21 VITALS — SYSTOLIC BLOOD PRESSURE: 135 MMHG | DIASTOLIC BLOOD PRESSURE: 77 MMHG

## 2020-12-21 VITALS — SYSTOLIC BLOOD PRESSURE: 146 MMHG | DIASTOLIC BLOOD PRESSURE: 78 MMHG

## 2020-12-21 LAB
ALBUMIN SERPL BCG-MCNC: 3.9 GM/DL (ref 3.2–5.2)
ALT SERPL W P-5'-P-CCNC: 29 U/L (ref 12–78)
BILIRUB SERPL-MCNC: 0.7 MG/DL (ref 0.2–1)
BUN SERPL-MCNC: 9 MG/DL (ref 7–18)
CALCIUM SERPL-MCNC: 9.2 MG/DL (ref 8.5–10.1)
CHLORIDE SERPL-SCNC: 101 MEQ/L (ref 98–107)
CO2 SERPL-SCNC: 30 MEQ/L (ref 21–32)
CREAT SERPL-MCNC: 0.7 MG/DL (ref 0.7–1.3)
GFR SERPL CREATININE-BSD FRML MDRD: > 60 ML/MIN/{1.73_M2} (ref 56–?)
GLUCOSE SERPL-MCNC: 226 MG/DL (ref 70–100)
HCT VFR BLD AUTO: 36.3 % (ref 42–52)
HGB BLD-MCNC: 12 G/DL (ref 13.5–17.5)
MCH RBC QN AUTO: 28 PG (ref 27–33)
MCHC RBC AUTO-ENTMCNC: 33.1 G/DL (ref 32–36.5)
MCV RBC AUTO: 84.6 FL (ref 80–96)
PLATELET # BLD AUTO: 239 10^3/UL (ref 150–450)
POTASSIUM SERPL-SCNC: 3.3 MEQ/L (ref 3.5–5.1)
PROT SERPL-MCNC: 6.6 GM/DL (ref 6.4–8.2)
RBC # BLD AUTO: 4.29 10^6/UL (ref 4.3–6.1)
SODIUM SERPL-SCNC: 136 MEQ/L (ref 136–145)
WBC # BLD AUTO: 3.5 10^3/UL (ref 4–10)

## 2020-12-21 RX ADMIN — METOCLOPRAMIDE SCH MG: 5 INJECTION, SOLUTION INTRAMUSCULAR; INTRAVENOUS at 08:57

## 2020-12-21 RX ADMIN — DULOXETINE HYDROCHLORIDE SCH MG: 30 CAPSULE, DELAYED RELEASE ORAL at 20:37

## 2020-12-21 RX ADMIN — INSULIN LISPRO SCH UNITS: 100 INJECTION, SOLUTION INTRAVENOUS; SUBCUTANEOUS at 20:39

## 2020-12-21 RX ADMIN — SUCRALFATE SCH GM: 1 SUSPENSION ORAL at 12:10

## 2020-12-21 RX ADMIN — ATORVASTATIN CALCIUM SCH MG: 20 TABLET, FILM COATED ORAL at 08:58

## 2020-12-21 RX ADMIN — PANTOPRAZOLE SODIUM SCH MG: 40 TABLET, DELAYED RELEASE ORAL at 08:58

## 2020-12-21 RX ADMIN — SODIUM CHLORIDE SCH UNITS: 4.5 INJECTION, SOLUTION INTRAVENOUS at 05:11

## 2020-12-21 RX ADMIN — KETOROLAC TROMETHAMINE SCH MG: 30 INJECTION, SOLUTION INTRAMUSCULAR at 08:00

## 2020-12-21 RX ADMIN — KETOROLAC TROMETHAMINE SCH MG: 30 INJECTION, SOLUTION INTRAMUSCULAR at 23:41

## 2020-12-21 RX ADMIN — METOCLOPRAMIDE SCH MG: 5 INJECTION, SOLUTION INTRAMUSCULAR; INTRAVENOUS at 20:37

## 2020-12-21 RX ADMIN — INSULIN LISPRO SCH UNITS: 100 INJECTION, SOLUTION INTRAVENOUS; SUBCUTANEOUS at 17:33

## 2020-12-21 RX ADMIN — ASPIRIN SCH MG: 81 TABLET ORAL at 08:58

## 2020-12-21 RX ADMIN — INSULIN DETEMIR SCH UNITS: 100 INJECTION, SOLUTION SUBCUTANEOUS at 08:59

## 2020-12-21 RX ADMIN — INSULIN LISPRO SCH UNITS: 100 INJECTION, SOLUTION INTRAVENOUS; SUBCUTANEOUS at 12:11

## 2020-12-21 RX ADMIN — SUCRALFATE SCH GM: 1 SUSPENSION ORAL at 20:37

## 2020-12-21 RX ADMIN — KETOROLAC TROMETHAMINE SCH MG: 30 INJECTION, SOLUTION INTRAMUSCULAR at 16:00

## 2020-12-21 RX ADMIN — SUCRALFATE SCH GM: 1 SUSPENSION ORAL at 17:30

## 2020-12-21 RX ADMIN — METOCLOPRAMIDE SCH MG: 5 INJECTION, SOLUTION INTRAMUSCULAR; INTRAVENOUS at 16:47

## 2020-12-21 RX ADMIN — INSULIN LISPRO SCH UNITS: 100 INJECTION, SOLUTION INTRAVENOUS; SUBCUTANEOUS at 09:00

## 2020-12-21 RX ADMIN — SODIUM CHLORIDE SCH UNITS: 4.5 INJECTION, SOLUTION INTRAVENOUS at 20:38

## 2020-12-21 RX ADMIN — SODIUM CHLORIDE SCH MLS/HR: 9 INJECTION, SOLUTION INTRAVENOUS at 18:23

## 2020-12-21 RX ADMIN — SODIUM CHLORIDE SCH UNITS: 4.5 INJECTION, SOLUTION INTRAVENOUS at 14:56

## 2020-12-21 RX ADMIN — DULOXETINE HYDROCHLORIDE SCH MG: 30 CAPSULE, DELAYED RELEASE ORAL at 08:58

## 2020-12-21 RX ADMIN — SUCRALFATE SCH GM: 1 SUSPENSION ORAL at 08:57

## 2020-12-21 NOTE — RO
OPERATIVE NOTE



DATE OF OPERATION:  12/17/2020



PREOPERATIVE DIAGNOSIS:  Epigastric pain, nausea, vomiting.



POSTOPERATIVE DIAGNOSIS:  Gastritis and reflux esophagitis.



PROCEDURE:  Esophagogastroduodenoscopy (EGD) with gastric biopsy.



SURGEON: Leo J. Gosselin, Jr., MD



ANESTHESIA: IV sedation/propofol.



ESTIMATED BLOOD LOSS: Minimal.



FLUIDS: Crystalloid.



BRIEF PROCEDURE SUMMARY: The patient was brought to the operating room and was

given IV sedation, placed in the left lateral decubitus position and the

gastroscope was inserted into the posterior oropharynx down into the esophagus

without difficulty. Scope was slipped into the stomach down into the distal

stomach where there was some mild gastritis appreciated in the prepyloric area,

and the scope was inserted into the duodenum and the post-bulbar duodenum; both

of which appeared normal. The scope was brought back into the stomach and I was

not appreciating any significant inflammatory changes in the distal stomach

itself and the antrum looked good and did not explain the epigastric pain;

however, with retroflexion of the scope and appearance of the fundus/cardiac

region, there was definitely significant inflammatory changes in this area. No

ulceration, but petechial hemorrhage and edema and inflammation present

consistent with moderate to severe gastritis. The scope was brought back into

the distal esophagus and there were actually areas of healing reflux

esophagitis probably where superficial ulcerations that have actually started

to heal over quite nicely at this time and a little bit of fibrinous exudate in

them but not actively inflamed at this time or ulcerated. Scope was brought

back revealing an otherwise normal esophagus. 



LABS: These were taken of the proximal stomach, otherwise no other labs. These

were taken and the patient was brought to the recovery room awake, alert and

hemodynamically stable. 



Recommendation at this time is to continue him on proton pump inhibitors as

well as Carafate, given that it appears that this is probably some significant

improvement of his reflux esophagitis and probable improvement of his gastritis.

## 2020-12-22 VITALS — DIASTOLIC BLOOD PRESSURE: 66 MMHG | SYSTOLIC BLOOD PRESSURE: 114 MMHG

## 2020-12-22 VITALS — SYSTOLIC BLOOD PRESSURE: 103 MMHG | DIASTOLIC BLOOD PRESSURE: 64 MMHG

## 2020-12-22 VITALS — DIASTOLIC BLOOD PRESSURE: 82 MMHG | SYSTOLIC BLOOD PRESSURE: 159 MMHG

## 2020-12-22 VITALS — DIASTOLIC BLOOD PRESSURE: 66 MMHG | SYSTOLIC BLOOD PRESSURE: 164 MMHG

## 2020-12-22 VITALS — DIASTOLIC BLOOD PRESSURE: 90 MMHG | SYSTOLIC BLOOD PRESSURE: 168 MMHG

## 2020-12-22 VITALS — DIASTOLIC BLOOD PRESSURE: 51 MMHG | SYSTOLIC BLOOD PRESSURE: 126 MMHG

## 2020-12-22 LAB
ALBUMIN SERPL BCG-MCNC: 3.1 GM/DL (ref 3.2–5.2)
ALT SERPL W P-5'-P-CCNC: 35 U/L (ref 12–78)
BILIRUB SERPL-MCNC: 0.3 MG/DL (ref 0.2–1)
BUN SERPL-MCNC: 11 MG/DL (ref 7–18)
CALCIUM SERPL-MCNC: 8.2 MG/DL (ref 8.5–10.1)
CHLORIDE SERPL-SCNC: 106 MEQ/L (ref 98–107)
CO2 SERPL-SCNC: 27 MEQ/L (ref 21–32)
CREAT SERPL-MCNC: 0.68 MG/DL (ref 0.7–1.3)
GFR SERPL CREATININE-BSD FRML MDRD: > 60 ML/MIN/{1.73_M2} (ref 56–?)
GLUCOSE SERPL-MCNC: 304 MG/DL (ref 70–100)
HCT VFR BLD AUTO: 32.7 % (ref 42–52)
HGB BLD-MCNC: 11 G/DL (ref 13.5–17.5)
MCH RBC QN AUTO: 28.6 PG (ref 27–33)
MCHC RBC AUTO-ENTMCNC: 33.6 G/DL (ref 32–36.5)
MCV RBC AUTO: 84.9 FL (ref 80–96)
PLATELET # BLD AUTO: 232 10^3/UL (ref 150–450)
POTASSIUM SERPL-SCNC: 3.5 MEQ/L (ref 3.5–5.1)
PROT SERPL-MCNC: 5.4 GM/DL (ref 6.4–8.2)
RBC # BLD AUTO: 3.85 10^6/UL (ref 4.3–6.1)
SODIUM SERPL-SCNC: 140 MEQ/L (ref 136–145)
WBC # BLD AUTO: 3.7 10^3/UL (ref 4–10)

## 2020-12-22 RX ADMIN — METOCLOPRAMIDE SCH MG: 5 INJECTION, SOLUTION INTRAMUSCULAR; INTRAVENOUS at 08:40

## 2020-12-22 RX ADMIN — INSULIN LISPRO SCH UNITS: 100 INJECTION, SOLUTION INTRAVENOUS; SUBCUTANEOUS at 12:26

## 2020-12-22 RX ADMIN — SUCRALFATE SCH GM: 1 SUSPENSION ORAL at 12:24

## 2020-12-22 RX ADMIN — METOCLOPRAMIDE SCH MG: 5 INJECTION, SOLUTION INTRAMUSCULAR; INTRAVENOUS at 21:25

## 2020-12-22 RX ADMIN — INSULIN DETEMIR SCH UNITS: 100 INJECTION, SOLUTION SUBCUTANEOUS at 08:41

## 2020-12-22 RX ADMIN — DULOXETINE HYDROCHLORIDE SCH MG: 30 CAPSULE, DELAYED RELEASE ORAL at 21:26

## 2020-12-22 RX ADMIN — MECLIZINE HYDROCHLORIDE SCH MG: 25 TABLET ORAL at 21:25

## 2020-12-22 RX ADMIN — MECLIZINE HYDROCHLORIDE SCH MG: 25 TABLET ORAL at 10:52

## 2020-12-22 RX ADMIN — METOCLOPRAMIDE SCH MG: 5 INJECTION, SOLUTION INTRAMUSCULAR; INTRAVENOUS at 17:02

## 2020-12-22 RX ADMIN — DULOXETINE HYDROCHLORIDE SCH MG: 30 CAPSULE, DELAYED RELEASE ORAL at 08:41

## 2020-12-22 RX ADMIN — SUCRALFATE SCH GM: 1 SUSPENSION ORAL at 17:01

## 2020-12-22 RX ADMIN — SUCRALFATE SCH GM: 1 SUSPENSION ORAL at 08:40

## 2020-12-22 RX ADMIN — INSULIN LISPRO SCH UNITS: 100 INJECTION, SOLUTION INTRAVENOUS; SUBCUTANEOUS at 21:00

## 2020-12-22 RX ADMIN — PANTOPRAZOLE SODIUM SCH MG: 40 TABLET, DELAYED RELEASE ORAL at 08:41

## 2020-12-22 RX ADMIN — SODIUM CHLORIDE SCH MLS/HR: 9 INJECTION, SOLUTION INTRAVENOUS at 03:17

## 2020-12-22 RX ADMIN — ATORVASTATIN CALCIUM SCH MG: 20 TABLET, FILM COATED ORAL at 08:41

## 2020-12-22 RX ADMIN — INSULIN LISPRO SCH UNITS: 100 INJECTION, SOLUTION INTRAVENOUS; SUBCUTANEOUS at 18:27

## 2020-12-22 RX ADMIN — MECLIZINE HYDROCHLORIDE SCH MG: 25 TABLET ORAL at 18:27

## 2020-12-22 RX ADMIN — SODIUM CHLORIDE SCH UNITS: 4.5 INJECTION, SOLUTION INTRAVENOUS at 05:48

## 2020-12-22 RX ADMIN — INSULIN LISPRO SCH UNITS: 100 INJECTION, SOLUTION INTRAVENOUS; SUBCUTANEOUS at 08:42

## 2020-12-22 RX ADMIN — ASPIRIN SCH MG: 81 TABLET ORAL at 08:41

## 2020-12-22 RX ADMIN — MECLIZINE HYDROCHLORIDE SCH MG: 25 TABLET ORAL at 14:24

## 2020-12-22 RX ADMIN — SUCRALFATE SCH GM: 1 SUSPENSION ORAL at 21:25

## 2020-12-22 RX ADMIN — SODIUM CHLORIDE SCH UNITS: 4.5 INJECTION, SOLUTION INTRAVENOUS at 14:25

## 2020-12-22 RX ADMIN — ACETAMINOPHEN PRN MG: 325 TABLET ORAL at 21:28

## 2020-12-22 RX ADMIN — SODIUM CHLORIDE SCH UNITS: 4.5 INJECTION, SOLUTION INTRAVENOUS at 21:27

## 2020-12-22 NOTE — IPNPDOC
Date Seen


The patient was seen on 12/22/20.





Progress Note


SUBJECTIVE: 


Complains of dizziness when he attempted to sit up and stand, unable to work 

with physical 


therapy for home safety evaluation. Patient was found to be orthostatic with 

blood pressure


 dropping to 77 systolic Upon standing. He denies any chest pain, palpitations,


 lightheadedness, shortness of breath. Despite systolic pressure of 180. He 

denies 


any headache or changes in vision, chest pressure or tightness. Discharge has 

been


 postponed until orthostasis improves and cleared by physical therapy





OBJECTIVE: 





PHYSICAL EXAMINATION: 


VS: please see below 


General: Resting in bed, AAO x 3, no icterus, no distress, speaks in full set


. HEENT Normocephalic, atraumatic, moist mucous membranes no JVD, no 

lymphadenopathy, no carotid bruit


, Heart: Regular rate and rhythm, S1S2 normal, 


. Lungs: Clear to auscultation bilaterally, 


. Abdomen: BS positive in 4 quadrants, soft, nondistended, no rebound or 

guarding, no organomegaly


. Extremities: No lower extremity edema bilaterally





LABORATORY DATA: 


Please see below 





IMAGING: 





CT abd/pelvis without contrast: 


There is a small focal density within a small bowel loop on the left, likely an 

ingested tablet or capsule. No bowel distention or obstruction.  No focal fluid 

collection to suggest abscess or hematoma.  No ascites.  No mass or adenopathy. 

No pneumoperitoneum. Otherwise, essentially negative CT of the abdomen and 

pelvis.





CXR: 


No active disease 





ASSESSMENT:


61 y/o M with PMH of IDDM Type I with hx recurrent admissions (last 8/2020) for 

DKA, hx of c. diff (8/2020), HTN, HLD, Depression and chronic back pain admitted

for DKA.





Problem list:


Orthostatic hypotension


Gastroparesis


Gastritis


Esophagitis


DKA, resolved


, Hypertension


, Hyperlipidemia


, Depression


, Chronic back pain


Type 1 diabetes


. Acute kidney injury, resolved





PLAN:


. Continue with IV fluid boluses until patient's orthostasis 


has resolved. Continue all other medications


Meclizine for vertigo. PT home safety evaluation. Discharge later


 today if cleared by therapy and orthostasis has resolved.


. Continue on PPI. Outpatient follow-up with general surgery.





VS, I&O, 24H, Fishbone


Vital Signs/I&O





Vital Signs








  Date Time  Temp Pulse Resp B/P (MAP) Pulse Ox O2 Delivery O2 Flow Rate FiO2


 


12/22/20 12:28  69  163/90 (114)    





  82  126/77 (93)    





  91  77/51 (60)    


 


12/22/20 06:00 98.7  18  98 Room Air  














I&O- Last 24 Hours up to 6 AM 


 


 12/22/20





 06:00


 


Intake Total 2990 ml


 


Output Total 1200 ml


 


Balance 1790 ml











Laboratory Data


24H LABS


Laboratory Tests 2


12/21/20 17:14: Bedside Glucose (Misc Panel) 171H


12/21/20 20:02: Bedside Glucose (Misc Panel) 266H


12/22/20 06:04: 


Nucleated Red Blood Cells % (auto) 0.0, Anion Gap 7L, Glomerular Filtration Rate

> 60.0, Calcium Level 8.2L, Total Bilirubin 0.3#, Aspartate Amino Transf 

(AST/SGOT) 25, Alanine Aminotransferase (ALT/SGPT) 35, Alkaline Phosphatase 72, 

Total Protein 5.4L, Albumin 3.1#L, Albumin/Globulin Ratio 1.3


12/22/20 11:43: Bedside Glucose (Misc Panel) 244H


CBC/BMP


Laboratory Tests


12/22/20 06:04

















DALE WEBSTER MD            Dec 22, 2020 13:24

## 2020-12-23 VITALS — DIASTOLIC BLOOD PRESSURE: 98 MMHG | SYSTOLIC BLOOD PRESSURE: 132 MMHG

## 2020-12-23 VITALS — SYSTOLIC BLOOD PRESSURE: 130 MMHG | DIASTOLIC BLOOD PRESSURE: 70 MMHG

## 2020-12-23 VITALS — DIASTOLIC BLOOD PRESSURE: 92 MMHG | SYSTOLIC BLOOD PRESSURE: 160 MMHG

## 2020-12-23 VITALS — DIASTOLIC BLOOD PRESSURE: 70 MMHG | SYSTOLIC BLOOD PRESSURE: 114 MMHG

## 2020-12-23 VITALS — DIASTOLIC BLOOD PRESSURE: 80 MMHG | SYSTOLIC BLOOD PRESSURE: 142 MMHG

## 2020-12-23 VITALS — SYSTOLIC BLOOD PRESSURE: 94 MMHG | DIASTOLIC BLOOD PRESSURE: 56 MMHG

## 2020-12-23 VITALS — SYSTOLIC BLOOD PRESSURE: 150 MMHG | DIASTOLIC BLOOD PRESSURE: 88 MMHG

## 2020-12-23 VITALS — SYSTOLIC BLOOD PRESSURE: 142 MMHG | DIASTOLIC BLOOD PRESSURE: 80 MMHG

## 2020-12-23 RX ADMIN — INSULIN DETEMIR SCH UNITS: 100 INJECTION, SOLUTION SUBCUTANEOUS at 22:17

## 2020-12-23 RX ADMIN — METOCLOPRAMIDE SCH MG: 5 INJECTION, SOLUTION INTRAMUSCULAR; INTRAVENOUS at 17:01

## 2020-12-23 RX ADMIN — SODIUM CHLORIDE SCH UNITS: 4.5 INJECTION, SOLUTION INTRAVENOUS at 22:17

## 2020-12-23 RX ADMIN — SUCRALFATE SCH GM: 1 SUSPENSION ORAL at 12:14

## 2020-12-23 RX ADMIN — PANTOPRAZOLE SODIUM SCH MG: 40 TABLET, DELAYED RELEASE ORAL at 08:48

## 2020-12-23 RX ADMIN — ACETAMINOPHEN PRN MG: 325 TABLET ORAL at 05:21

## 2020-12-23 RX ADMIN — ASPIRIN SCH MG: 81 TABLET ORAL at 08:48

## 2020-12-23 RX ADMIN — METOCLOPRAMIDE SCH MG: 5 INJECTION, SOLUTION INTRAMUSCULAR; INTRAVENOUS at 08:49

## 2020-12-23 RX ADMIN — SUCRALFATE SCH GM: 1 SUSPENSION ORAL at 22:16

## 2020-12-23 RX ADMIN — SUCRALFATE SCH GM: 1 SUSPENSION ORAL at 08:46

## 2020-12-23 RX ADMIN — SUCRALFATE SCH GM: 1 SUSPENSION ORAL at 17:01

## 2020-12-23 RX ADMIN — INSULIN LISPRO SCH UNITS: 100 INJECTION, SOLUTION INTRAVENOUS; SUBCUTANEOUS at 08:50

## 2020-12-23 RX ADMIN — SODIUM CHLORIDE SCH UNITS: 4.5 INJECTION, SOLUTION INTRAVENOUS at 13:26

## 2020-12-23 RX ADMIN — SODIUM CHLORIDE SCH UNITS: 4.5 INJECTION, SOLUTION INTRAVENOUS at 05:19

## 2020-12-23 RX ADMIN — DULOXETINE HYDROCHLORIDE SCH MG: 30 CAPSULE, DELAYED RELEASE ORAL at 08:48

## 2020-12-23 RX ADMIN — INSULIN LISPRO SCH UNITS: 100 INJECTION, SOLUTION INTRAVENOUS; SUBCUTANEOUS at 12:15

## 2020-12-23 RX ADMIN — MIDODRINE HYDROCHLORIDE SCH MG: 5 TABLET ORAL at 12:14

## 2020-12-23 RX ADMIN — MECLIZINE HYDROCHLORIDE SCH MG: 25 TABLET ORAL at 08:48

## 2020-12-23 RX ADMIN — DULOXETINE HYDROCHLORIDE SCH MG: 30 CAPSULE, DELAYED RELEASE ORAL at 22:16

## 2020-12-23 RX ADMIN — MIDODRINE HYDROCHLORIDE SCH MG: 5 TABLET ORAL at 17:01

## 2020-12-23 RX ADMIN — INSULIN LISPRO SCH UNITS: 100 INJECTION, SOLUTION INTRAVENOUS; SUBCUTANEOUS at 21:00

## 2020-12-23 RX ADMIN — METOCLOPRAMIDE SCH MG: 5 INJECTION, SOLUTION INTRAMUSCULAR; INTRAVENOUS at 22:16

## 2020-12-23 RX ADMIN — ACETAMINOPHEN PRN MG: 325 TABLET ORAL at 15:30

## 2020-12-23 RX ADMIN — INSULIN LISPRO SCH UNITS: 100 INJECTION, SOLUTION INTRAVENOUS; SUBCUTANEOUS at 17:03

## 2020-12-23 RX ADMIN — ATORVASTATIN CALCIUM SCH MG: 20 TABLET, FILM COATED ORAL at 08:48

## 2020-12-23 RX ADMIN — INSULIN DETEMIR SCH UNITS: 100 INJECTION, SOLUTION SUBCUTANEOUS at 08:49

## 2020-12-23 NOTE — IPNPDOC
Date Seen


The patient was seen on 12/23/20.





Progress Note


SUBJECTIVE: 


still orthostatic despite ivfluids yesterday. c/o dizziness


no falls. no cp, diaphoresis,cp, tightness.





OBJECTIVE: 





PHYSICAL EXAMINATION: 


VS: please see below 


General:no distress aaox 3


. HEENT Normocephalic, atraumatic, moist mucous membranes no JVD, no lymphad

enopathy, no carotid bruit


, Heart: Regular rate and rhythm, S1S2 normal, 


. Lungs: Clear to auscultation bilaterally, 


. Abdomen: BS positive in 4 quadrants, soft, nondistended, no rebound or 

guarding, no organomegaly


. Extremities: No lower extremity edema bilaterally





LABORATORY DATA: 


Please see below 





IMAGING: 





CT abd/pelvis without contrast: 


There is a small focal density within a small bowel loop on the left, likely an 

ingested tablet or capsule. No bowel distention or obstruction.  No focal fluid 

collection to suggest abscess or hematoma.  No ascites.  No mass or adenopathy. 

No pneumoperitoneum. Otherwise, essentially negative CT of the abdomen and 

pelvis.





CXR: 


No active disease 





ASSESSMENT:


61 y/o M with PMH of IDDM Type I with hx recurrent admissions (last 8/2020) for 

DKA, hx of c. diff (8/2020), HTN, HLD, Depression and chronic back pain admitted

for DKA.





Problem list:


Orthostatic hypotension


autonomic dysfunction


Gastroparesis


Gastritis


Esophagitis


DKA, resolved


, Hypertension


, Hyperlipidemia


, Depression


, Chronic back pain


Type 1 diabetes


. Acute kidney injury, resolved





PLAN:


symptomatic w his orthostatic hypotension, and unable to ambulate without


dizziness. not cleared for discharge due to blood pressure 78mmHg with standing


despite ivfluids. no c/o sob. pt will be tried on midodrine tid. if stable and 

cleared by PT


may dc home.





VS, I&O, 24H, Fishbone


Vital Signs/I&O





Vital Signs








  Date Time  Temp Pulse Resp B/P (MAP) Pulse Ox O2 Delivery O2 Flow Rate FiO2


 


12/23/20 10:33  84  142/80 (100)    





  84  102/72 (82)    





  80  78/40 (53)    


 


12/23/20 06:00 98.3  17  100 Room Air  














I&O- Last 24 Hours up to 6 AM 


 


 12/23/20





 06:00


 


Intake Total 5850 ml


 


Output Total 6675 ml


 


Balance -825 ml











Laboratory Data


24H LABS


Laboratory Tests 2


12/22/20 11:43: Bedside Glucose (Misc Panel) 244H


12/22/20 17:07: Bedside Glucose (Misc Panel) 181H


12/22/20 19:54: Bedside Glucose (Misc Panel) 159H


12/23/20 06:53: Bedside Glucose (Misc Panel) 331H











DALE WEBSTER MD            Dec 23, 2020 10:54

## 2020-12-24 VITALS — SYSTOLIC BLOOD PRESSURE: 148 MMHG | DIASTOLIC BLOOD PRESSURE: 90 MMHG

## 2020-12-24 VITALS — DIASTOLIC BLOOD PRESSURE: 68 MMHG | SYSTOLIC BLOOD PRESSURE: 126 MMHG

## 2020-12-24 VITALS — DIASTOLIC BLOOD PRESSURE: 90 MMHG | SYSTOLIC BLOOD PRESSURE: 118 MMHG

## 2020-12-24 LAB
BUN SERPL-MCNC: 12 MG/DL (ref 7–18)
CALCIUM SERPL-MCNC: 9.2 MG/DL (ref 8.5–10.1)
CHLORIDE SERPL-SCNC: 105 MEQ/L (ref 98–107)
CO2 SERPL-SCNC: 32 MEQ/L (ref 21–32)
CREAT SERPL-MCNC: 0.69 MG/DL (ref 0.7–1.3)
GFR SERPL CREATININE-BSD FRML MDRD: > 60 ML/MIN/{1.73_M2} (ref 56–?)
GLUCOSE SERPL-MCNC: 72 MG/DL (ref 70–100)
POTASSIUM SERPL-SCNC: 3.9 MEQ/L (ref 3.5–5.1)
SODIUM SERPL-SCNC: 141 MEQ/L (ref 136–145)

## 2020-12-24 RX ADMIN — ASPIRIN SCH MG: 81 TABLET ORAL at 08:09

## 2020-12-24 RX ADMIN — INSULIN LISPRO SCH UNITS: 100 INJECTION, SOLUTION INTRAVENOUS; SUBCUTANEOUS at 07:30

## 2020-12-24 RX ADMIN — SODIUM CHLORIDE SCH UNITS: 4.5 INJECTION, SOLUTION INTRAVENOUS at 14:00

## 2020-12-24 RX ADMIN — PANTOPRAZOLE SODIUM SCH MG: 40 TABLET, DELAYED RELEASE ORAL at 08:09

## 2020-12-24 RX ADMIN — ATORVASTATIN CALCIUM SCH MG: 20 TABLET, FILM COATED ORAL at 08:09

## 2020-12-24 RX ADMIN — MIDODRINE HYDROCHLORIDE SCH MG: 5 TABLET ORAL at 12:06

## 2020-12-24 RX ADMIN — INSULIN LISPRO SCH UNITS: 100 INJECTION, SOLUTION INTRAVENOUS; SUBCUTANEOUS at 12:07

## 2020-12-24 RX ADMIN — INSULIN DETEMIR SCH UNITS: 100 INJECTION, SOLUTION SUBCUTANEOUS at 08:09

## 2020-12-24 RX ADMIN — DULOXETINE HYDROCHLORIDE SCH MG: 30 CAPSULE, DELAYED RELEASE ORAL at 08:09

## 2020-12-24 RX ADMIN — SUCRALFATE SCH GM: 1 SUSPENSION ORAL at 08:09

## 2020-12-24 RX ADMIN — MIDODRINE HYDROCHLORIDE SCH MG: 5 TABLET ORAL at 08:09

## 2020-12-24 RX ADMIN — MIDODRINE HYDROCHLORIDE SCH MG: 5 TABLET ORAL at 16:01

## 2020-12-24 RX ADMIN — SODIUM CHLORIDE SCH UNITS: 4.5 INJECTION, SOLUTION INTRAVENOUS at 06:18

## 2020-12-24 RX ADMIN — METOCLOPRAMIDE SCH MG: 5 INJECTION, SOLUTION INTRAMUSCULAR; INTRAVENOUS at 08:09

## 2020-12-24 RX ADMIN — SUCRALFATE SCH GM: 1 SUSPENSION ORAL at 12:06

## 2020-12-24 NOTE — IPNPDOC
Date Seen


The patient was seen on 12/24/20.





Progress Note


SUBJECTIVE: 


Denies dizziness, lightheadedness or syncope. No falls.


 No chest pain, pressure, tightness, shortness of breath, PND or orthopnea





OBJECTIVE: 


PHYSICAL EXAMINATION: 


VS: please see below 


General: No conversational dyspnea. Appears his stated age, no distress


. HEENT, moist mucous membranes no JVD, no lymphadenopathy, no carotid bruit


, Heart: Regular rate and rhythm, S1S2 , no carotid bruit


. Lungs: Clear to auscultation bilaterally, no adventitious breath sounds


. Abdomen: BS positive in 4 quadrants, soft, nondistended, 


, Nontender no rebound or guarding, no organomegaly


. Extremities: No lower extremity edema bilaterally





LABORATORY DATA: 


Please see below 





IMAGING: 





CT abd/pelvis without contrast: 


There is a small focal density within a small bowel loop on the left, likely an 

ingested tablet or capsule. No bowel distention or obstruction.  No focal fluid 

collection to suggest abscess or hematoma.  No ascites.  No mass or adenopathy. 

No pneumoperitoneum. Otherwise, essentially negative CT of the abdomen and 

pelvis.





CXR: 


No active disease 





ASSESSMENT:


61 y/o M with PMH of IDDM Type I with hx recurrent admissions (last 8/2020) for 

DKA, hx of c. diff (8/2020), HTN, HLD, Depression and chronic back pain admitted

for DKA.





Problem list:


Orthostatic hypotension


autonomic dysfunction


Gastroparesis


Gastritis


Esophagitis


DKA, resolved


, Hypertension


, Hyperlipidemia


, Depression


, Chronic back pain


Type 1 diabetes, uncontrolled


. Acute kidney injury, resolved





PLAN:


-Increased midodrine to 10 mg 3 times a day 


-discontinued Patient's lisinopril. 


-Await patient's home safety evaluation


-Increase Levemir insulin to 18 units subcutaneous 


twice a day for better glycemic control.





VS, I&O, 24H, Fishbone


Vital Signs/I&O





Vital Signs








  Date Time  Temp Pulse Resp B/P (MAP) Pulse Ox O2 Delivery O2 Flow Rate FiO2


 


12/24/20 06:27  88  140/90 (107)    





  94  118/80 (93)    





    80/50 (60)    


 


12/24/20 06:00 98.2  18  98 Room Air  














I&O- Last 24 Hours up to 6 AM 


 


 12/24/20





 06:00


 


Intake Total 1500 ml


 


Output Total 5275 ml


 


Balance -3775 ml











Laboratory Data


24H LABS


Laboratory Tests 2


12/23/20 11:36: Bedside Glucose (Misc Panel) 329H


12/23/20 16:57: Bedside Glucose (Misc Panel) 234H


12/23/20 20:53: Bedside Glucose (Misc Panel) 234H


12/24/20 05:33: 


Anion Gap 4L, Glomerular Filtration Rate > 60.0, Calcium Level 9.2


CBC/BMP


Laboratory Tests


12/24/20 05:33

















DALE WEBSTER MD            Dec 24, 2020 10:47

## 2021-01-03 ENCOUNTER — HOSPITAL ENCOUNTER (OUTPATIENT)
Dept: HOSPITAL 53 - M LABSMTC | Age: 61
End: 2021-01-03
Attending: ANESTHESIOLOGY
Payer: COMMERCIAL

## 2021-01-03 DIAGNOSIS — Z20.828: ICD-10-CM

## 2021-01-03 DIAGNOSIS — Z01.812: Primary | ICD-10-CM

## 2021-01-08 ENCOUNTER — HOSPITAL ENCOUNTER (OUTPATIENT)
Dept: HOSPITAL 53 - M OPP | Age: 61
Discharge: HOME | End: 2021-01-08
Attending: INTERNAL MEDICINE
Payer: COMMERCIAL

## 2021-01-08 VITALS — HEIGHT: 75 IN | WEIGHT: 177.8 LBS | BODY MASS INDEX: 22.11 KG/M2

## 2021-01-08 VITALS — SYSTOLIC BLOOD PRESSURE: 141 MMHG | DIASTOLIC BLOOD PRESSURE: 76 MMHG

## 2021-01-08 DIAGNOSIS — Z79.4: ICD-10-CM

## 2021-01-08 DIAGNOSIS — K57.30: ICD-10-CM

## 2021-01-08 DIAGNOSIS — E10.9: ICD-10-CM

## 2021-01-08 DIAGNOSIS — D12.3: ICD-10-CM

## 2021-01-08 DIAGNOSIS — Z79.82: ICD-10-CM

## 2021-01-08 DIAGNOSIS — F41.9: ICD-10-CM

## 2021-01-08 DIAGNOSIS — Z88.8: ICD-10-CM

## 2021-01-08 DIAGNOSIS — F17.210: ICD-10-CM

## 2021-01-08 DIAGNOSIS — E78.5: ICD-10-CM

## 2021-01-08 DIAGNOSIS — R10.84: Primary | ICD-10-CM

## 2021-01-08 DIAGNOSIS — F32.9: ICD-10-CM

## 2021-01-08 DIAGNOSIS — D12.2: ICD-10-CM

## 2021-01-08 DIAGNOSIS — R12: ICD-10-CM

## 2021-01-08 DIAGNOSIS — I10: ICD-10-CM

## 2021-01-08 DIAGNOSIS — Z79.899: ICD-10-CM

## 2021-01-08 DIAGNOSIS — Z80.8: ICD-10-CM

## 2021-01-08 DIAGNOSIS — G62.9: ICD-10-CM

## 2021-01-08 DIAGNOSIS — K64.8: ICD-10-CM

## 2021-01-08 DIAGNOSIS — R19.4: ICD-10-CM

## 2021-01-08 NOTE — ROOR
________________________________________________________________________________

Patient Name: Tereso Beckett             Procedure Date: 1/8/2021 3:15 PM

MRN: [id number]                          Account Number: [id number]

YOB: 1960               Age: 60

Room: [facility]                            Gender: Male

Note Status: Finalized                 

________________________________________________________________________________

 

Procedure:            Colonoscopy

Indications:          Generalized abdominal pain, Change in bowel habits

Providers:            Abel FRANKLIN MD

Referring MD:         CASSIE PALENCIA

Requesting Provider:  

Medicines:            Monitored Anesthesia Care

Complications:        No immediate complications.

________________________________________________________________________________

Procedure:            Pre-Anesthesia Assessment:

                      - The heart rate, respiratory rate, oxygen saturations, 

                      blood pressure, adequacy of pulmonary ventilation, and 

                      response to care were monitored throughout the procedure.

                      The colonoscopy was performed without difficulty. The 

                      patient tolerated the procedure well. The quality of the 

                      bowel preparation was fair. The Colonoscope was 

                      introduced through the anus and advanced to the cecum, 

                      identified by appendiceal orifice and ileocecal valve.

                                                                                

Findings:

     The perianal and digital rectal examinations were normal.

     Four sessile polyps were found in the splenic flexure and ascending 

     colon. The polyps were 5 to 6 mm in size. These polyps were removed with 

     a cold snare. Resection and retrieval were complete.

     Mild sigmoid diverticulosis and small internal hemorrhoids.

     The colon is somewhat redundant and spastic, but was otherwise without 

     abnormality on direct and retroflexion views.

                                                                                

Impression:           - Preparation of the colon was fair.

                      - Four 5 to 6 mm polyps at the splenic flexure and in 

                      the ascending colon, removed with a cold snare. Resected 

                      and retrieved.

                      - Mild sigmoid diverticulosis and small internal 

                      hemorrhoids.

                      - The examination was otherwise normal on direct and 

                      retroflexion views.

Recommendation:       - Repeat colonoscopy in 3 years for surveillance.

                      - Continue present medications.

                                                                                

Procedure Code(s):    --- Professional ---

                      71349, Colonoscopy, flexible; with removal of tumor(s), 

                      polyp(s), or other lesion(s) by snare technique

Diagnosis Code(s):    --- Professional ---

                      R19.4, Change in bowel habit

                      R10.84, Generalized abdominal pain

                      K63.5, Polyp of colon

 

CPT copyright 2019 [employer]. All rights reserved.

 

The codes documented in this report are preliminary and upon  review may 

be revised to meet current compliance requirements.

 

Abel Franklin MD

________________

Abel FRANKLIN MD

1/8/2021 3:49:10 PM

Electronically signed by Abel FRANKLIN MD

Number of Addenda: 0

 

Note Initiated On: 1/8/2021 3:15 PM

Estimated Blood Loss: Estimated blood loss: none.

## 2021-01-20 ENCOUNTER — HOSPITAL ENCOUNTER (OUTPATIENT)
Dept: HOSPITAL 53 - M LABSMTC | Age: 61
End: 2021-01-20
Attending: PEDIATRICS
Payer: SELF-PAY

## 2021-01-20 DIAGNOSIS — Z20.822: Primary | ICD-10-CM

## 2021-04-06 ENCOUNTER — HOSPITAL ENCOUNTER (INPATIENT)
Dept: HOSPITAL 53 - M ED | Age: 61
LOS: 3 days | Discharge: HOME | DRG: 420 | End: 2021-04-09
Attending: INTERNAL MEDICINE | Admitting: FAMILY MEDICINE
Payer: COMMERCIAL

## 2021-04-06 VITALS — WEIGHT: 171.3 LBS | HEIGHT: 75 IN | BODY MASS INDEX: 21.3 KG/M2

## 2021-04-06 DIAGNOSIS — E10.10: Primary | ICD-10-CM

## 2021-04-06 DIAGNOSIS — G47.00: ICD-10-CM

## 2021-04-06 DIAGNOSIS — Z88.8: ICD-10-CM

## 2021-04-06 DIAGNOSIS — Z79.899: ICD-10-CM

## 2021-04-06 DIAGNOSIS — Z79.82: ICD-10-CM

## 2021-04-06 DIAGNOSIS — F32.9: ICD-10-CM

## 2021-04-06 DIAGNOSIS — E78.5: ICD-10-CM

## 2021-04-06 DIAGNOSIS — F17.200: ICD-10-CM

## 2021-04-06 DIAGNOSIS — J18.9: ICD-10-CM

## 2021-04-06 DIAGNOSIS — N17.9: ICD-10-CM

## 2021-04-06 DIAGNOSIS — Z79.4: ICD-10-CM

## 2021-04-06 DIAGNOSIS — M54.5: ICD-10-CM

## 2021-04-06 DIAGNOSIS — I10: ICD-10-CM

## 2021-04-06 DIAGNOSIS — K52.9: ICD-10-CM

## 2021-04-06 LAB
ALBUMIN SERPL BCG-MCNC: 4.3 GM/DL (ref 3.2–5.2)
ALT SERPL W P-5'-P-CCNC: 25 U/L (ref 12–78)
AMPHETAMINES UR QL SCN: NEGATIVE
B-OH-BUTYR SERPL-MCNC: > 46 MG/DL (ref ?–2.81)
BARBITURATES UR QL SCN: NEGATIVE
BASE EXCESS BLDV CALC-SCNC: -24.6 MMOL/L (ref -2–2)
BASOPHILS # BLD AUTO: 0.1 10^3/UL (ref 0–0.2)
BASOPHILS NFR BLD AUTO: 0.2 % (ref 0–1)
BENZODIAZ UR QL SCN: NEGATIVE
BILIRUB CONJ SERPL-MCNC: 0.3 MG/DL (ref 0–0.2)
BILIRUB SERPL-MCNC: 0.9 MG/DL (ref 0.2–1)
BUN SERPL-MCNC: 40 MG/DL (ref 7–18)
BZE UR QL SCN: NEGATIVE
CALCIUM SERPL-MCNC: 10.2 MG/DL (ref 8.5–10.1)
CANNABINOIDS UR QL SCN: NEGATIVE
CHLORIDE SERPL-SCNC: 89 MEQ/L (ref 98–107)
CK MB CFR.DF SERPL CALC: 2.05
CK MB SERPL-MCNC: 16.5 NG/ML (ref ?–3.6)
CK SERPL-CCNC: 806 U/L (ref 39–308)
CO2 BLDV CALC-SCNC: 4.2 MEQ/L (ref 24–28)
CO2 SERPL-SCNC: 5 MEQ/L (ref 21–32)
CREAT SERPL-MCNC: 1.89 MG/DL (ref 0.7–1.3)
EOSINOPHIL # BLD AUTO: 0 10^3/UL (ref 0–0.5)
EOSINOPHIL NFR BLD AUTO: 0 % (ref 0–3)
EST. AVERAGE GLUCOSE BLD GHB EST-MCNC: 278 MG/DL (ref 60–110)
ETHANOL SERPL-MCNC: < 0.003 % (ref 0–0.01)
GFR SERPL CREATININE-BSD FRML MDRD: 39.2 ML/MIN/{1.73_M2} (ref 56–?)
GLUCOSE SERPL-MCNC: 584 MG/DL (ref 70–100)
HCO3 BLDV-SCNC: 3.8 MEQ/L (ref 23–27)
HCO3 STD BLDV-SCNC: 7.5 MEQ/L
HCT VFR BLD AUTO: 41.7 % (ref 42–52)
HGB BLD-MCNC: 13.2 G/DL (ref 13.5–17.5)
LIPASE SERPL-CCNC: 291 U/L (ref 73–393)
LYMPHOCYTES # BLD AUTO: 1.4 10^3/UL (ref 1.5–5)
LYMPHOCYTES NFR BLD AUTO: 5.8 % (ref 24–44)
MCH RBC QN AUTO: 29.2 PG (ref 27–33)
MCHC RBC AUTO-ENTMCNC: 31.7 G/DL (ref 32–36.5)
MCV RBC AUTO: 92.3 FL (ref 80–96)
METHADONE UR QL SCN: NEGATIVE
MONOCYTES # BLD AUTO: 2 10^3/UL (ref 0–0.8)
MONOCYTES NFR BLD AUTO: 8.5 % (ref 2–8)
NEUTROPHILS # BLD AUTO: 20.3 10^3/UL (ref 1.5–8.5)
NEUTROPHILS NFR BLD AUTO: 84.5 % (ref 36–66)
OPIATES UR QL SCN: NEGATIVE
OSMOLALITY SERPL: 322 MOSM/KG (ref 275–295)
PCO2 BLDV: 13.8 MMHG (ref 38–50)
PCP UR QL SCN: NEGATIVE
PH BLDV: 7.06 UNITS (ref 7.33–7.43)
PLATELET # BLD AUTO: 303 10^3/UL (ref 150–450)
PO2 BLDV: 138.1 MMHG (ref 30–50)
POTASSIUM SERPL-SCNC: 5.5 MEQ/L (ref 3.5–5.1)
PROT SERPL-MCNC: 7.3 GM/DL (ref 6.4–8.2)
RBC # BLD AUTO: 4.52 10^6/UL (ref 4.3–6.1)
RSV RNA NPH QL NAA+PROBE: NEGATIVE
SAO2 % BLDV: 98.1 % (ref 60–80)
SODIUM SERPL-SCNC: 125 MEQ/L (ref 136–145)
TROPONIN I SERPL-MCNC: 0.08 NG/ML (ref ?–0.1)
WBC # BLD AUTO: 24 10^3/UL (ref 4–10)

## 2021-04-06 NOTE — REPVR
PROCEDURE INFORMATION: 



Exam: XR Chest 

Exam date and time: 4/06/21  (9:28pm)

Age: 60 years old 

Clinical indication:  DKA



TECHNIQUE: 

Imaging protocol:  Portable CXR

Views:  1 view 



COMPARISON: 

Portable CXR of 12/16/20 



FINDINGS: 

Lungs:  Hazy opacity laterally in the right mid lung zone, at the hilar level.  

The lung fields are otherwise clear.  No masses.  

Pleural spaces: Unremarkable. No pleural effusions. No pneumothorax. 

Heart/Mediastinum: Unremarkable. No cardiomegaly. 

Bones/joints: Unremarkable. 





IMPRESSION: 

Focal hazy opacity laterally in the right mid lung zone.  Possible focal 

pneumonia, eg.

The lung fields are otherwise clear.  No pleural effusions.

The lung fields were clear in Dec. 2020.



Electronically signed by: Katelin Lincoln On 04/06/2021  22:16:52 PM

## 2021-04-07 VITALS — DIASTOLIC BLOOD PRESSURE: 53 MMHG | SYSTOLIC BLOOD PRESSURE: 96 MMHG

## 2021-04-07 VITALS — DIASTOLIC BLOOD PRESSURE: 69 MMHG | SYSTOLIC BLOOD PRESSURE: 151 MMHG

## 2021-04-07 VITALS — DIASTOLIC BLOOD PRESSURE: 61 MMHG | SYSTOLIC BLOOD PRESSURE: 102 MMHG

## 2021-04-07 VITALS — SYSTOLIC BLOOD PRESSURE: 121 MMHG | DIASTOLIC BLOOD PRESSURE: 61 MMHG

## 2021-04-07 VITALS — DIASTOLIC BLOOD PRESSURE: 78 MMHG | SYSTOLIC BLOOD PRESSURE: 144 MMHG

## 2021-04-07 VITALS — DIASTOLIC BLOOD PRESSURE: 72 MMHG | SYSTOLIC BLOOD PRESSURE: 112 MMHG

## 2021-04-07 VITALS — SYSTOLIC BLOOD PRESSURE: 138 MMHG | DIASTOLIC BLOOD PRESSURE: 73 MMHG

## 2021-04-07 VITALS — SYSTOLIC BLOOD PRESSURE: 128 MMHG | DIASTOLIC BLOOD PRESSURE: 72 MMHG

## 2021-04-07 VITALS — SYSTOLIC BLOOD PRESSURE: 129 MMHG | DIASTOLIC BLOOD PRESSURE: 78 MMHG

## 2021-04-07 VITALS — SYSTOLIC BLOOD PRESSURE: 118 MMHG | DIASTOLIC BLOOD PRESSURE: 67 MMHG

## 2021-04-07 LAB
BASE EXCESS BLDV CALC-SCNC: -12.8 MMOL/L (ref -2–2)
BASE EXCESS BLDV CALC-SCNC: -18.4 MMOL/L (ref -2–2)
BASE EXCESS BLDV CALC-SCNC: -8.3 MMOL/L (ref -2–2)
BDY SITE: (no result)
BUN SERPL-MCNC: 34 MG/DL (ref 7–18)
BUN SERPL-MCNC: 37 MG/DL (ref 7–18)
BUN SERPL-MCNC: 41 MG/DL (ref 7–18)
BUN SERPL-MCNC: 42 MG/DL (ref 7–18)
CALCIUM SERPL-MCNC: 9.2 MG/DL (ref 8.5–10.1)
CALCIUM SERPL-MCNC: 9.4 MG/DL (ref 8.5–10.1)
CALCIUM SERPL-MCNC: 9.6 MG/DL (ref 8.5–10.1)
CALCIUM SERPL-MCNC: 9.6 MG/DL (ref 8.5–10.1)
CHLORIDE SERPL-SCNC: 106 MEQ/L (ref 98–107)
CHLORIDE SERPL-SCNC: 110 MEQ/L (ref 98–107)
CHLORIDE SERPL-SCNC: 111 MEQ/L (ref 98–107)
CHLORIDE SERPL-SCNC: 98 MEQ/L (ref 98–107)
CO2 BLDV CALC-SCNC: 13.6 MEQ/L (ref 24–28)
CO2 BLDV CALC-SCNC: 18.6 MEQ/L (ref 24–28)
CO2 BLDV CALC-SCNC: 8.8 MEQ/L (ref 24–28)
CO2 SERPL-SCNC: 17 MEQ/L (ref 21–32)
CO2 SERPL-SCNC: 19 MEQ/L (ref 21–32)
CO2 SERPL-SCNC: 20 MEQ/L (ref 21–32)
CO2 SERPL-SCNC: 9 MEQ/L (ref 21–32)
CREAT SERPL-MCNC: 1.03 MG/DL (ref 0.7–1.3)
CREAT SERPL-MCNC: 1.16 MG/DL (ref 0.7–1.3)
CREAT SERPL-MCNC: 1.36 MG/DL (ref 0.7–1.3)
CREAT SERPL-MCNC: 1.72 MG/DL (ref 0.7–1.3)
GFR SERPL CREATININE-BSD FRML MDRD: 43.7 ML/MIN/{1.73_M2} (ref 56–?)
GFR SERPL CREATININE-BSD FRML MDRD: 57.3 ML/MIN/{1.73_M2} (ref 56–?)
GFR SERPL CREATININE-BSD FRML MDRD: > 60 ML/MIN/{1.73_M2} (ref 56–?)
GFR SERPL CREATININE-BSD FRML MDRD: > 60 ML/MIN/{1.73_M2} (ref 56–?)
GLUCOSE SERPL-MCNC: 123 MG/DL (ref 70–100)
GLUCOSE SERPL-MCNC: 151 MG/DL (ref 70–100)
GLUCOSE SERPL-MCNC: 166 MG/DL (ref 70–100)
GLUCOSE SERPL-MCNC: 345 MG/DL (ref 70–100)
HCO3 BLDV-SCNC: 12.7 MEQ/L (ref 23–27)
HCO3 BLDV-SCNC: 17.5 MEQ/L (ref 23–27)
HCO3 BLDV-SCNC: 8.1 MEQ/L (ref 23–27)
HCO3 STD BLDV-SCNC: 14.6 MEQ/L
HCO3 STD BLDV-SCNC: 17.7 MEQ/L
HCT VFR BLD AUTO: 34.4 % (ref 42–52)
HGB BLD-MCNC: 12 G/DL (ref 13.5–17.5)
MAGNESIUM SERPL-MCNC: 2.3 MG/DL (ref 1.8–2.4)
MCH RBC QN AUTO: 29.4 PG (ref 27–33)
MCHC RBC AUTO-ENTMCNC: 34.9 G/DL (ref 32–36.5)
MCV RBC AUTO: 84.3 FL (ref 80–96)
OSMOLALITY SERPL: 293 MOSM/KG (ref 275–295)
OSMOLALITY SERPL: 296 MOSM/KG (ref 275–295)
OSMOLALITY SERPL: 301 MOSM/KG (ref 275–295)
OSMOLALITY SERPL: 312 MOSM/KG (ref 275–295)
PCO2 BLDV: 22.2 MMHG (ref 38–50)
PCO2 BLDV: 28.9 MMHG (ref 38–50)
PCO2 BLDV: 36.9 MMHG (ref 38–50)
PH BLDV: 7.18 UNITS (ref 7.33–7.43)
PH BLDV: 7.26 UNITS (ref 7.33–7.43)
PH BLDV: 7.29 UNITS (ref 7.33–7.43)
PHOSPHATE SERPL-MCNC: 1.3 MG/DL (ref 2.5–4.9)
PHOSPHATE SERPL-MCNC: 1.4 MG/DL (ref 2.5–4.9)
PHOSPHATE SERPL-MCNC: 2 MG/DL (ref 2.5–4.9)
PHOSPHATE SERPL-MCNC: 3.2 MG/DL (ref 2.5–4.9)
PLATELET # BLD AUTO: 260 10^3/UL (ref 150–450)
PO2 BLDV: 118.1 MMHG (ref 30–50)
PO2 BLDV: 43.7 MMHG (ref 30–50)
PO2 BLDV: 62.8 MMHG (ref 30–50)
POTASSIUM SERPL-SCNC: 3.9 MEQ/L (ref 3.5–5.1)
POTASSIUM SERPL-SCNC: 4.1 MEQ/L (ref 3.5–5.1)
RBC # BLD AUTO: 4.08 10^6/UL (ref 4.3–6.1)
SAO2 % BLDV: 77.9 % (ref 60–80)
SAO2 % BLDV: 91.8 % (ref 60–80)
SAO2 % BLDV: 98.2 % (ref 60–80)
SODIUM SERPL-SCNC: 133 MEQ/L (ref 136–145)
SODIUM SERPL-SCNC: 136 MEQ/L (ref 136–145)
SODIUM SERPL-SCNC: 136 MEQ/L (ref 136–145)
SODIUM SERPL-SCNC: 138 MEQ/L (ref 136–145)
WBC # BLD AUTO: 7.8 10^3/UL (ref 4–10)

## 2021-04-07 RX ADMIN — SODIUM CHLORIDE SCH MLS/HR: 9 INJECTION, SOLUTION INTRAVENOUS at 18:24

## 2021-04-07 RX ADMIN — DEXTROSE MONOHYDRATE SCH MG: 50 INJECTION, SOLUTION INTRAVENOUS at 08:46

## 2021-04-07 RX ADMIN — Medication SCH: at 09:12

## 2021-04-07 RX ADMIN — Medication SCH: at 07:56

## 2021-04-07 RX ADMIN — ASPIRIN SCH MG: 81 TABLET ORAL at 08:46

## 2021-04-07 RX ADMIN — ATORVASTATIN CALCIUM SCH MG: 20 TABLET, FILM COATED ORAL at 08:46

## 2021-04-07 RX ADMIN — INSULIN LISPRO SCH UNITS: 100 INJECTION, SOLUTION INTRAVENOUS; SUBCUTANEOUS at 12:00

## 2021-04-07 RX ADMIN — ENOXAPARIN SODIUM SCH MG: 40 INJECTION SUBCUTANEOUS at 08:46

## 2021-04-07 RX ADMIN — POTASSIUM CHLORIDE SCH MLS/HR: 7.46 INJECTION, SOLUTION INTRAVENOUS at 07:52

## 2021-04-07 RX ADMIN — INSULIN DETEMIR SCH UNITS: 100 INJECTION, SOLUTION SUBCUTANEOUS at 20:35

## 2021-04-07 RX ADMIN — DULOXETINE HYDROCHLORIDE SCH MG: 30 CAPSULE, DELAYED RELEASE ORAL at 08:46

## 2021-04-07 RX ADMIN — DEXTROSE AND SODIUM CHLORIDE SCH MLS/HR: 5; 900 INJECTION, SOLUTION INTRAVENOUS at 05:28

## 2021-04-07 RX ADMIN — INSULIN LISPRO SCH UNITS: 100 INJECTION, SOLUTION INTRAVENOUS; SUBCUTANEOUS at 17:30

## 2021-04-07 RX ADMIN — POTASSIUM CHLORIDE SCH MLS/HR: 7.46 INJECTION, SOLUTION INTRAVENOUS at 06:57

## 2021-04-07 RX ADMIN — DOXYCYCLINE HYCLATE SCH MG: 100 TABLET, COATED ORAL at 20:34

## 2021-04-07 RX ADMIN — SODIUM CHLORIDE SCH MLS/HR: 9 INJECTION, SOLUTION INTRAVENOUS at 10:30

## 2021-04-07 RX ADMIN — INSULIN DETEMIR SCH UNITS: 100 INJECTION, SOLUTION SUBCUTANEOUS at 10:30

## 2021-04-07 RX ADMIN — PROBIOTIC PRODUCT - TAB SCH EA: TAB at 18:24

## 2021-04-07 RX ADMIN — INSULIN LISPRO SCH UNITS: 100 INJECTION, SOLUTION INTRAVENOUS; SUBCUTANEOUS at 20:53

## 2021-04-07 RX ADMIN — DEXTROSE AND SODIUM CHLORIDE SCH MLS/HR: 5; 900 INJECTION, SOLUTION INTRAVENOUS at 09:02

## 2021-04-07 NOTE — HPEPDOC
[facility] Medical History & Physical


Date of Admission


Apr 6, 2021


Date of Service:  Apr 6, 2021


Primary Care Physician:  Nick Garcia


Attending Physician:  NATHAN HOGAN MD





History and Physical


CHIEF COMPLAINT:


Fatigue, diarrhea, and vomiting





HISTORY OF PRESENT ILLNESS:


Tereso is a 59yo male with notable PMHx of IDDMI with recent admissions for DKA in

August and December 2020, history of C. difficile infection (August 2020), 

hypertension, and hyperlipidemia, who presented to the ED on 4/6/21 complaining 

of watery diarrhea with small amounts of blood over the past 3 days, 2 episodes 

of reportedly light red-colored emesis this morning, and increased lethargy. 

He last ate 2 days ago (Easter Sunday), and was able to tolerate drinking some 

small amounts of fluid this morning. He reports consistently taking both his 

long acting and short acting insulin as prescribed, and last took insulin this 

morning. He reports accompanying dizziness whenever standing over the past 34 

days, as well as currently feeling as though his work of breathing is increased.

He also reports currently feeling thirsty, having chills, and experiencing inter

mittent palpitations without accompanying chest pain or chest pressure. 

Patients daughter (Denae) reports he is had some episodes of confusion over 

the past couple days. Patient denies any current pleuritic chest pain, abdominal

pain, nausea, or significant pain. Of note, patients daughter states that both 

of her children have been dealing with a gastrointestinal illness over the past 

few days and were in contact with the patient.





In the ED, he was moderately tachypneic, yet. Otherwise, his vital signs were 

stable. Initial labs showed WBC of 24,000, serum glucose of 584, serum potassium

5.5, serum sodium 125, bicarbonate 5, BUN 40, and creatinine 1.89. A venous 

blood gas resulted with pH 7.056 and PCO2 13.8. Anion gap was 31. Beta 

hydroxybutyrate was greater than 46 and serum osmolality was 322. A hemoglobin 

A1c was 11.3%. Chest x-ray showed questionable infiltrate in the, and in the 

setting of his elevated white count, he was given a one-time dose of 

ceftriaxone. He was given an IV fluid bolus and started on an insulin drip. He 

was subsequently admitted to the ICU primarily for continued management of his 

diabetic ketoacidosis.





PAST MEDICAL HISTORY:


Insulin-dependent diabetes mellitus type 1 with recent DKA admissions in August 

and December 2020


History of C. difficile infection in August 2020, subsequently treated with oral

vancomycin


Hyperlipidemia


Hypertension


Chronic back pain


Depression





PAST SURGICAL HISTORY:


Bilateral hernia repair


Laminectomy





SOCIAL HISTORY:


Patient lives alone in [place], [place].. He is not working as he is on disability. 

He formerly was a .


He is a current smoker, smoking 1 pack per day for the past 35 years.


He currently drinks alcohol, averaging a sixpack of beer per week.


He denies any current or former illegal drug use.





FAMILY HISTORY:


Father, leukemia


One brother, myocardial infarction


Two other brothers are diabetics (type I versus type II is not known)





ALLERGIES:


Please see below.





REVIEW OF SYSTEMS:


Constitutional: Reports fatigue, chills. Denies recent unintentional change in 

weight or night sweats.


Cardiovascular: Reports intermittent palpitations. Denies chest pain or chest 

pressure.


Respiratory: Reports increased work of breathing with some shortness of breath. 

Denies productive cough or pleuritic chest pain.


Gastrointestinal: Reports 2 episodes of nausea this morning that were light red 

in color. Also reports 3 days of watery diarrhea with some small amounts of 

blood. Denies any current abdominal pain or nausea.


Lymphatic: Denies any new lumps or bumps.


Hematologic: Denies easy bleeding or easy bruising.


Genitourinary: Denies dysuria or hematuria.


Neurologic: Reports some dizziness upon standing over the past few days but 

denies any falls. Patient himself denies any issues as far as concentration, 

focus, or increased confusion. Patients daughter is present and she feels as 

though he has had episodes of some mild confusion over the past few days.


HEENT: Denies diplopia or blurry vision. Denies dysphagia or odynophagia


Psychiatric: Reports mood is been relatively well controlled of late.





HOME MEDICATIONS:


Please see below. 





PHYSICAL EXAMINATION:


Vital signs: Temperature 97.8, heart rate of 84, respiratory rate of 26, blood 

pressure 115/56, SPO2 of 96% on room air


Gen.: Tired appearing  male. Appears older than stated age. Shallow and

slightly rapid breathing. Alert and oriented 3.


HEENT: Normocephalic, atraumatic. Noninjected, anicteric sclera. Pupils are 

reactive to light and accommodation. Mild conjunctival pallor.


Oral cavity: Moderately dry mucous membranes with no pharyngeal erythema or 

exudate appreciated.


Neck: Supple. No lymphadenopathy appreciated.


Cardiovascular: Regular rate, regular rhythm. Normal S1, S2. No significant 

murmurs, rubs or gallops are appreciated. 2+ radial pulses bilaterally.


Respiratory: Moderately diminished breath sounds bilaterally. No significant 

adventitious breath sounds are appreciated. Slightly tachypneic with decreased 

tidal volume. Symmetric chest expansion. No pursed lip breathing or visualize 

accessory muscle use. Speaking a few words at a time, not complete sentences.


Gastrointestinal: Hypoactive bowel sounds throughout. Soft and nontender. There 

is a mild amount of distention periumbilical. He. No significant 

hepatosplenomegaly appreciated. There is no rigidity.


Back: No tenderness over the spine or paraspinal muscles. No CVA tenderness.


Extremities: Bilateral lower extremity as are free of edema. There is 

onychomycosis of toenails. Hands and feet are cooler to the touch. There is some

scabbing over the dorsal aspect of both feet and some moderate amount of brown 

pigmentation discoloration of the distal lower extremities bilaterally.


Neurologic: Patient is slightly lethargic, but is alert and oriented 3. He 

responds. All questions and commands appropriately. Non-dysarthric speech. Nose 

focal deficits appreciated.


Psychiatric: Appeared exasperated at times during exam. Affect appears 

appropriate.





LABORATORY DATA:


Please see below.





IMAGING:


Portable/1-view chest x-ray, 4/6/21


Impression Focal hazy opacity laterally in the right mid lung zone.  Possible 

focal pneumonia.The lung fields are otherwise clear.  No pleural effusions.


The lung fields were clear in Dec. 2020.





MICROBIOLOGY:


Please see below. 





ASSESSMENT & PLAN:


This is a 60-year-old male with a notable past medical history of insulin-depen

dent diabetes mellitus type 1 with 2 DKA admissions in the past 8 months, C. 

difficile infection in 8/2020, hypertension, and hyperlipidemia who was admitted

under the care of the hospitalist service on 4/6/21 to the ICU for continued 

management of diabetic ketoacidosis, necessitating insulin drip and fluid 

resuscitation.





#Diabetic ketoacidosis, likely secondary to gastrointestinal illness and right 

middle lobe pneumonia.


, AG 31, positive ketones (beta-HB > 46), sOsm 322, corrected Ca 133, sK 

5.5, VBG pH 7.056, WBC 24


-NPO except sips/chips


-Insulin gtt; IVF with NS due to low corrected Ca and signs of moderate 

hypovolemia on exam; telemetry


-f/u BMPs, Mg, Phos, VBG











#Community-acquired pneumonia


-opacity seen in right lung on cxr


-s/p single dose of ceftriaxone in ED; q24 ceftriaxone continued


-doxycycline ordered for atypical coverage with azithromycin avoided due to QT 

prolongation risk in setting of prn zofran














#Gastroenteritis


-currently npo with initial insulin gtt and DKA work-up ongoing


-GI panel ordered as risk of infectious etiology exists - - WBC 24 with recent 

exposure to 2 grandchildren with stomach bug and watery, bloody  diarrhea with 

blood


-IVF running


-occult stool ordered as pt reported some small amounts of blood in diarrhea 

over the past 3 days


-of note, has h/o c. diff infection 8 months ago; s/p po vanc dosing and 

followed w/ ID as outpt














#Acute renal failure likely pre-renal 2/2 dehydration from emesis and diarrhea


-sCr 1.84; baseline 0.8-1


-IVF ordered after receiving NS bolus in ED


-home Lisinopril held


-f/u with repeat BMPs 














#Hypertension


-home Lisinopril held in setting of acute renal failure














#Hyperlipidemia


-home atorvastatin and ASA contd once pt off npo

















#Depression


-home duloxetine continued once pt off npo














#History of insomnia


-home zolpidem contd














#Chronic back pain


-home codeine-acetaminophen held


-prn Tylenol ordered once pt no longer npo














#DVT prophylaxis: sc qd lovenox


#GI prophylaxis: IV protonix





Code status: Full Code





Disposition: ICU admission for DKA management and d/c home when medically 

stable, off insulin drip, and tolerating po diet





Vital Signs





Vital Signs








  Date Time  Temp Pulse Resp B/P (MAP) Pulse Ox O2 Delivery O2 Flow Rate FiO2


 


4/7/21 04:00 99.2 84 18 118/67 (84) 99 Room Air  











Laboratory Data


Labs 24H


Laboratory Tests 2


4/6/21 20:59: Bedside Glucose (Misc Panel) 584*H


4/6/21 21:22: 


Immature Granulocyte % (Auto) 1.0, Neutrophils (%) (Auto) 84.5H, Lymphocytes (%)

(Auto) 5.8L, Monocytes (%) (Auto) 8.5H, Eosinophils (%) (Auto) 0.0, Basophils (%

) (Auto) 0.2, Neutrophils # (Auto) 20.3H, Lymphocytes # (Auto) 1.4L, Monocytes #

(Auto) 2.0H, Eosinophils # (Auto) 0.0, Basophils # (Auto) 0.1, Nucleated Red 

Blood Cells % (auto) 0.0, Blood Gas Bicarbonate Standard 7.5, Venous Blood pH 

7.056L, Venous Blood Partial Pressure CO2 13.8L, Venous Blood Partial Pressure 

O2 138.1H, Venous Blood Total Carbon Dioxide 4.2L, Venous Blood HCO3 3.8L, 

Venous Blood Oxygen Saturation 98.1H, Venous Blood Base Excess -24.6L, Anion Gap

31H, Glomerular Filtration Rate 39.2L, Estimated Mean Plasma Glucose 278H, 

Hemoglobin A1c 11.3, Osmolality 322H, Calcium Level 10.2H, Total Bilirubin 0.9, 

Direct Bilirubin 0.3H, Aspartate Amino Transf (AST/SGOT) 26, Alanine Amino

transferase (ALT/SGPT) 25, Alkaline Phosphatase 119H, Total Creatine Kinase 

806H, Creatine Kinase MB 16.5H, Creatine Kinase MB Relative Index 2.05, Troponin

I 0.08, Total Protein 7.3, Albumin 4.3, Albumin/Globulin Ratio 1.4, Lipase 291, 

Ethyl Alcohol Level < 0.003, B-Hydroxybutyrate > 46.00H, Coronavirus (COVID-

19)(PCR) NEGATIVE, Influenza Type A (RT-PCR) NEGATIVE, Influenza Type B (RT-PCR)

NEGATIVE, Respiratory Syncytial Virus (PCR) NEGATIVE


4/6/21 21:48: 


Urine Color STRAW, Urine Appearance CLEAR, Urine pH 5.0, Urine Specific Gravity 

1.016, Urine Protein 1+H, Urine Glucose (UA) 3+H, Urine Ketones 2+H, Urine Blood

2+H, Urine Nitrite NEGATIVE, Urine Bilirubin NEGATIVE, Urine Urobilinogen 0.2, 

Urine Leukocyte Esterase NEGATIVE, Urine WBC (Auto) 2, Urine RBC (Auto) 0, Urine

Hyaline Casts (Auto) 0, Urine Bacteria (Auto) NEGATIVE, Urine Squamous 

Epithelial Cells 0, Urine Sperm (Auto) , Urine Opiates Screen NEGATIVE, Urine 

Methadone Screen NEGATIVE, Urine Barbiturates Screen NEGATIVE, Urine 

Phencyclidine Screen NEGATIVE, Urine Amphetamines Screen NEGATIVE, Urine Benzo

diazepines Screen NEGATIVE, Urine Cocaine Metabolite Screen NEGATIVE, Urine 

Cannabinoids Screen NEGATIVE


4/7/21 00:30: Bedside Glucose (Misc Panel) 451H


4/7/21 01:25: 


Blood Gas Puncture Site UNKNOWN, Venous Blood pH 7.179L, Venous Blood Partial 

Pressure CO2 22.2L, Venous Blood Partial Pressure O2 43.7, Venous Blood Total 

Carbon Dioxide 8.8L, Venous Blood HCO3 8.1L, Venous Blood Oxygen Saturation 

77.9, Venous Blood Base Excess -18.4L, Anion Gap 26H, Glomerular Filtration Rate

43.7L, Osmolality 312H, Calcium Level 9.6, Phosphorus Level 3.2, Magnesium Level

2.3


4/7/21 02:54: Bedside Glucose (Misc Panel) 258H


4/7/21 03:51: Bedside Glucose (Misc Panel) 202H


4/7/21 04:54: Bedside Glucose (Misc Panel) 181H


4/7/21 05:31: 


Blood Gas Bicarbonate Standard 14.6, Venous Blood pH 7.262L, Venous Blood 

Partial Pressure CO2 28.9L, Venous Blood Partial Pressure O2 118.1H, Venous 

Blood Total Carbon Dioxide 13.6L, Venous Blood HCO3 12.7L, Venous Blood Oxygen 

Saturation 98.2H, Venous Blood Base Excess -12.8L


CBC/BMP


Laboratory Tests


4/6/21 21:22








4/7/21 01:25











Microbiology





Microbiology


4/6/21 Blood Culture, Received


         Pending


4/6/21 Blood Culture, Received


         Pending





Home Medications


Scheduled


Aspirin (Aspirin EC) 81 Mg Tab, 81 MG PO DAILY


Atorvastatin Calcium (Atorvastatin Calcium) 20 Mg Tablet, 20 MG PO DAILY


Duloxetine Hcl (Cymbalta) 60 Mg Cap, 60 MG PO DAILY


Duloxetine Hcl (Duloxetine HCl) 30 Mg Cap, 30 MG PO QHS


Insulin Glargine,Hum.rec.anlog (Basaglar Kwikpen U-100) 100 Unit/1 Ml 

Insuln.pen, 18 UNIT SC BID


Insulin Lispro (Admelog Solostar) 100 Unit/1 Ml Insuln.pen, 1 UNIT SC TID


   SLIDING SCALE -200 =8 UNITS    201-250 = 10 UNITS    251-300 =12 UNITS 


Lisinopril (Lisinopril) 5 Mg Tablet, 5 MG PO DAILY


Midodrine HCl (Midodrine HCl) 10 Mg Tablet, 10 MG PO TID


   0800/1200/1600 


Pantoprazole Sodium (Pantoprazole Sodium) 40 Mg Tablet.dr, 40 MG PO DAILY


Saccharomyces Boulardii (Florastor) 250 Mg Capsule, 250 MG PO BIDWM


Zolpidem Tartrate (Ambien) 10 Mg Tab, 10 MG PO QHS





Scheduled PRN


Acetaminophen with Codeine (Acetaminophen-Cod #3 Tablet) 1 Tab Tab, 2 TAB PO Q8H

PRN for PAIN


Meclizine HCl (Meclizine HCl) 25 Mg Tablet, 25 MG PO Q8H PRN for DIZZINESS


Ondansetron HCl (Ondansetron HCl) 4 Mg Tablet, 4 MG PO Q4H PRN for NAUSEA OR 

VOMITING





Allergies


Coded Allergies:  


     pregabalin (Verified  Adverse Reaction, Unknown, LEG SWELLING, 12/16/20)





A-FIB/CHADSVASC


A-FIB History


Current/History of A-Fib/PAF?:  No


Current PO Anticoag Therapy:  No





GME ATTESTATION


GME ATTESTATION


My faculty preceptor for this patient encounter was physically present during 

the encounter and was fully available. All aspects of the patient interview, 

examination, medical decision making process, and medical care plan development 

were reviewed and approved by the faculty preceptor. The faculty preceptor is 

aware and concurs with the plan as stated in the body of this note and will 

attest to such by his/her cosignature.





ATTENDING NOTE


IKEYON, have independently examined this patient and performed my own 

physical exam, as well as reviewed the documentation and edited where necessary.

I have discussed in detail with the resident / student the findings and plan of 

treatment as documented by the resident / student and edited their note. I agree

with their findings and treatment plan and have edited their documentation. I 

will continue to follow the patient during this hospital stay.





DKA - admit to ICU for insulin drip per protocol











ADIA METZ D.O.            Apr 7, 2021 05:54


NATHAN HOGAN MD               Apr 8, 2021 00:02

## 2021-04-07 NOTE — IPNPDOC
Date Seen


The patient was seen on 4/7/21.





Progress Note


SUBJECTIVE: 


Improving acidosis, AG closed, multiple BS readings <200. Transitioned off 

insulin gtt to levemir, ISS, consistent carb diet. Denies chest pain, n/v/d, 

fevers. 





OBJECTIVE: 





PHYSICAL EXAMINATION:


Vital signs: Please see below 


GENERAL: NAD, resting in bed, slightly lethargic. AAOx3 


HEENT: AT/NC. Noninjected, anicteric sclera. Pupils are reactive to light and 

accommodation. Mild conjunctival pallor.


Neck: Supple. No lymphadenopathy appreciated. No JVD 


Cardiovascular: S1S2 +, no M/R/G, NSR 


Respiratory: CTAB, no W/R/R 


GI: soft, nontender, BS + in 4 quad. 


Extremities: Bilateral lower extremity as are free of edema. There is 

onychomycosis of toenails. Hands and feet are cooler to the touch. There is some

scabbing over the dorsal aspect of both feet and some moderate amount of brown 

pigmentation discoloration of the distal lower extremities bilaterally.


Neurologic: CN 2-12 intact, lethargic but no focal deficits 


Psychiatric: Mood and affect appropriate 





LABORATORY DATA:


Please see below.





IMAGING:


CXR 4/6/21:


Focal hazy opacity laterally in the right mid lung zone.  Possible focal 

pneumonia.The lung fields are otherwise clear.  No pleural effusions. The lung 

fields were clear in Dec. 2020.





MICROBIOLOGY:


Please see below. 





ASSESSMENT:


This is a 60-year-old male with a notable past medical history of insulin-

dependent diabetes mellitus type 1 with 2 DKA admissions in the past 8 months, 

C. difficile infection in 8/2020, hypertension, and hyperlipidemia who was 

admitted under the care of the hospitalist service on 4/6/21 to the ICU for 

continued management of diabetic ketoacidosis, necessitating insulin drip and 

fluid resuscitation.








PLAN: 





DKA likely 2/2 to gastroenteritis, RML community acquired PNA- resolved 


-BS improved, multiple readings < 200 


-AG closed, pH improving 


-D/c insulin gtt, transitioned to levemir 25 U SC BID, ISS, FS AC/HS, consistent

carb diet


-Daily labs


-Tx of infections below 





Community-acquired pneumonia


-WBC wnl, afebrile


-CXR above 


-Ceftriaxone, doxycycline, probiotic 


-F/u BCx and sputum cx 





Gastroenteritis


-Hx of C. diff infection (8 mo ago) 


-No diarrhea overnight, improved nausea with no vomiting 


-Some of these symptoms also could have been 2/2 to DKA 


-C/w IVF


-Send GI panel if diarrhea returns  





Acute renal failure likely pre-renal 2/2 dehydration from emesis and diarrhea


-Cr 1.34- improving. Baseline 0.8-1


-Holding home ACEi


-c/w IVFs, daily labs. 





Hypertension


-Stable


-Holding ACEi  in setting of acute renal failure





Hyperlipidemia


-C/w statin 





#Depression


-C/w duloxetine





History of insomnia


-C/w home med 





Chronic back pain


-Stable 


-home codeine-acetaminophen held


-prn Tylenol for now while still groggy 





DVT prophylaxis


-SC qd lovenox





GI prophylaxis


-PPI








DISPOSITION: Currently admitted to ICU but, if BS remained controlled, can 

likely downgrade soon. will need PT/OT. Plan is d/c home when medically 

improved. 








TOTAL AMOUNT OF ICU TIME spent caring for patient (nonprocedural): 40 MINS





VS, I&O, 24H, Fishbone


Vital Signs/I&O





Vital Signs








  Date Time  Temp Pulse Resp B/P (MAP) Pulse Ox O2 Delivery O2 Flow Rate FiO2


 


4/7/21 12:00 99.2 90 18 144/78 (100) 99 Room Air  














I&O- Last 24 Hours up to 6 AM 


 


 4/7/21





 06:00


 


Intake Total 2337 ml


 


Output Total 500 ml


 


Balance 1837 ml











Laboratory Data


24H LABS


Laboratory Tests 2


4/6/21 20:59: Bedside Glucose (Misc Panel) 584*H


4/6/21 21:22: 


Immature Granulocyte % (Auto) 1.0, Neutrophils (%) (Auto) 84.5H, Lymphocytes (%)

(Auto) 5.8L, Monocytes (%) (Auto) 8.5H, Eosinophils (%) (Auto) 0.0, Basophils 

(%) (Auto) 0.2, Neutrophils # (Auto) 20.3H, Lymphocytes # (Auto) 1.4L, Monocytes

# (Auto) 2.0H, Eosinophils # (Auto) 0.0, Basophils # (Auto) 0.1, Nucleated Red 

Blood Cells % (auto) 0.0, Blood Gas Bicarbonate Standard 7.5, Venous Blood pH 

7.056L, Venous Blood Partial Pressure CO2 13.8L, Venous Blood Partial Pressure 

O2 138.1H, Venous Blood Total Carbon Dioxide 4.2L, Venous Blood HCO3 3.8L, 

Venous Blood Oxygen Saturation 98.1H, Venous Blood Base Excess -24.6L, Anion Gap

31H, Glomerular Filtration Rate 39.2L, Estimated Mean Plasma Glucose 278H, 

Hemoglobin A1c 11.3, Osmolality 322H, Calcium Level 10.2H, Total Bilirubin 0.9, 

Direct Bilirubin 0.3H, Aspartate Amino Transf (AST/SGOT) 26, Alanine 

Aminotransferase (ALT/SGPT) 25, Alkaline Phosphatase 119H, Total Creatine Kinase

806H, Creatine Kinase MB 16.5H, Creatine Kinase MB Relative Index 2.05, Troponin

I 0.08, Total Protein 7.3, Albumin 4.3, Albumin/Globulin Ratio 1.4, Lipase 291, 

Ethyl Alcohol Level < 0.003, B-Hydroxybutyrate > 46.00H, Coronavirus (COVID-

19)(PCR) NEGATIVE, Influenza Type A (RT-PCR) NEGATIVE, Influenza Type B (RT-PCR)

NEGATIVE, Respiratory Syncytial Virus (PCR) NEGATIVE


4/6/21 21:48: 


Urine Color STRAW, Urine Appearance CLEAR, Urine pH 5.0, Urine Specific Gravity 

1.016, Urine Protein 1+H, Urine Glucose (UA) 3+H, Urine Ketones 2+H, Urine Blood

2+H, Urine Nitrite NEGATIVE, Urine Bilirubin NEGATIVE, Urine Urobilinogen 0.2, 

Urine Leukocyte Esterase NEGATIVE, Urine WBC (Auto) 2, Urine RBC (Auto) 0, Urine

Hyaline Casts (Auto) 0, Urine Bacteria (Auto) NEGATIVE, Urine Squamous 

Epithelial Cells 0, Urine Sperm (Auto) , Urine Opiates Screen NEGATIVE, Urine 

Methadone Screen NEGATIVE, Urine Barbiturates Screen NEGATIVE, Urine 

Phencyclidine Screen NEGATIVE, Urine Amphetamines Screen NEGATIVE, Urine 

Benzodiazepines Screen NEGATIVE, Urine Cocaine Metabolite Screen NEGATIVE, Urine

Cannabinoids Screen NEGATIVE


4/7/21 00:30: Bedside Glucose (Misc Panel) 451H


4/7/21 00:58: Bedside Glucose (Misc Panel) 381H


4/7/21 01:25: 


Blood Gas Puncture Site UNKNOWN, Venous Blood pH 7.179L, Venous Blood Partial 

Pressure CO2 22.2L, Venous Blood Partial Pressure O2 43.7, Venous Blood Total 

Carbon Dioxide 8.8L, Venous Blood HCO3 8.1L, Venous Blood Oxygen Saturation 

77.9, Venous Blood Base Excess -18.4L, Anion Gap 26H, Glomerular Filtration Rate

43.7L, Osmolality 312H, Calcium Level 9.6, Phosphorus Level 3.2, Magnesium Level

2.3


4/7/21 01:52: Bedside Glucose (Misc Panel) 287H


4/7/21 02:54: Bedside Glucose (Misc Panel) 258H


4/7/21 03:51: Bedside Glucose (Misc Panel) 202H


4/7/21 04:54: Bedside Glucose (Misc Panel) 181H


4/7/21 05:31: 


Blood Gas Bicarbonate Standard 14.6, Venous Blood pH 7.262L, Venous Blood Part

ial Pressure CO2 28.9L, Venous Blood Partial Pressure O2 118.1H, Venous Blood 

Total Carbon Dioxide 13.6L, Venous Blood HCO3 12.7L, Venous Blood Oxygen 

Saturation 98.2H, Venous Blood Base Excess -12.8L, Anion Gap 13, Glomerular 

Filtration Rate 57.3, Osmolality 301H, Calcium Level 9.4, Phosphorus Level 

2.0#L, Magnesium Level 2.3


4/7/21 05:59: Bedside Glucose (Misc Panel) 183H


4/7/21 06:48: Bedside Glucose (Misc Panel) 165H


4/7/21 07:51: Bedside Glucose (Misc Panel) 180H


4/7/21 08:09: Nucleated Red Blood Cells % (auto) 0.0


4/7/21 08:14: Bedside Glucose (Misc Panel) 164H


4/7/21 09:05: Bedside Glucose (Misc Panel) 142H


4/7/21 09:24: 


Blood Gas Bicarbonate Standard 17.7, Venous Blood pH 7.294L, Venous Blood 

Partial Pressure CO2 36.9L, Venous Blood Partial Pressure O2 62.8H, Venous Blood

Total Carbon Dioxide 18.6L, Venous Blood HCO3 17.5L, Venous Blood Oxygen 

Saturation 91.8H, Venous Blood Base Excess -8.3L, Anion Gap 7L, Glomerular 

Filtration Rate > 60.0, Osmolality 296H, Calcium Level 9.2, Phosphorus Level 

1.4#L, Magnesium Level 2.3


4/7/21 10:06: Bedside Glucose (Misc Panel) 135H


4/7/21 11:07: Bedside Glucose (Misc Panel) 123H


4/7/21 12:03: 


Anion Gap 7L, Glomerular Filtration Rate > 60.0, Osmolality 293, Calcium Level 

9.6, Phosphorus Level 1.3L, Magnesium Level 2.3


4/7/21 12:24: Bedside Glucose (Misc Panel) 119H


CBC/BMP


Laboratory Tests


4/6/21 21:22








4/7/21 01:25








4/7/21 05:31








4/7/21 08:09








4/7/21 09:24








4/7/21 12:03








Microbiology





Microbiology


4/6/21 Blood Culture, Received


         Pending


4/6/21 Blood Culture, Received


         Pending





Current Medications





Current Medications








 Medications


  (Trade)  Dose


 Ordered  Sig/Chen


 Route


 PRN Reason  Start Time


 Stop Time Status Last Admin


Dose Admin


 


 Acetaminophen


  (Tylenol Tab)  650 mg  Q4HP  PRN


 PO


 pain or fever  4/7/21 00:05


     





 


 Aspirin


  (Ecotrin)  81 mg  DAILY


 PO


   4/7/21 09:00


    4/7/21 08:46





 


 Atorvastatin


 Calcium


  (Lipitor)  20 mg  DAILY


 PO


   4/7/21 09:00


    4/7/21 08:46





 


 Ceftriaxone


 Sodium 1 gm/


 Dextrose  50 ml @ 


 100 mls/hr  Q24H


 IV


   4/8/21 00:00


     





 


 Dextrose


  (Dextrose 50%)  25 ml  ASDIRECTED  PRN


 IV


 SEE LABEL COMMENTS  4/7/21 09:55


     





 


 Dextrose/Sodium


 Chloride  1,000 ml @ 


 250 mls/hr  Q4H


 IV


   4/7/21 05:00


 4/7/21 09:54 DC 4/7/21 09:02





 


 Doxycycline


 Hyclate 100 mg/


 Dextrose  100 ml @ 


 100 mls/hr  Q12H


 IV


   4/7/21 12:00


    4/7/21 11:21





 


 Duloxetine HCl


  (Cymbalta)  60 mg  DAILY


 PO


   4/7/21 09:00


    4/7/21 08:46





 


 Enoxaparin Sodium


  (Lovenox)  40 mg  DAILY


 SC


   4/7/21 09:00


    4/7/21 08:46





 


 Glucagon


  (Glucagon)  1 mg  ASDIRECTED  PRN


 SC


 SEE LABEL COMMENTS  4/7/21 09:55


     





 


 Glucose


  (Glucose)  16 GM  ASDIRECTED  PRN


 PO


 SEE LABEL COMMENTS  4/7/21 09:55


     





 


 Home Med


  (Med Rec


 Complete!)    ASDIRECTED


 XX


   4/6/21 22:05


 4/6/21 22:09 DC  





 


 Influenza Virus


 Vaccine


  (Flublok


 Quad(Egg-Free)18y&Older


 Influenza)  0.5 ml  ASDIRECTED


 IM


   4/7/21 03:10


     





 


 Insulin Detemir


  (Levemir Insulin)  20 units  BID


 SC


   4/7/21 21:00


 4/7/21 09:56 DC  





 


 Insulin Detemir


  (Levemir Insulin)  25 units  BID


 SC


   4/7/21 09:00


    4/7/21 10:30





 


 Insulin Human


 Lispro


  (HumaLOG INSULIN)  SEE


 PROTOCOL


 TABLE  AC


 SC


   4/7/21 12:00


     





 


 Insulin Human


 Lispro


  (HumaLOG INSULIN)  SEE


 PROTOCOL


 TABLE  QHS


 SC


   4/7/21 21:00


     





 


 Insulin Human


 Regular 100 unit/


 IV Miscellaneous


 Supplies  100 ml @ 


 8.182 mls/


 hr  [id number]


 IV


   4/6/21 22:38


 4/7/21 00:13 DC 4/6/21 23:28





 


 Insulin Human


 Regular 100 unit/


 IV Miscellaneous


 Supplies  100 ml @ 


 18 mls/hr  Q5H34M


 IV


   4/6/21 22:17


   Cancel  





 


 Insulin Human


 Regular 100 unit/


 IV Miscellaneous


 Supplies  100 ml @ 0


 mls/hr  Q0M


 IV


   4/7/21 00:10


 4/7/21 09:54 DC  





 


 Non-Formulary


 Medication


  (Insulin Iv Rate


 Change


 Documentation ml/


 Hr)    ASDIRECTED


 XX


   4/6/21 22:20


 5/6/21 22:19 Cancel  





 


 Non-Formulary


 Medication


  (Insulin Iv Rate


 Change


 Documentation ml/


 Hr)    ASDIRECTED


 XX


   4/6/21 22:40


 4/7/21 00:13 DC  





 


 Non-Formulary


 Medication


  (Insulin Iv Rate


 Change


 Documentation ml/


 Hr)    ASDIRECTED


 XX


   4/7/21 00:10


 4/7/21 09:54 DC 4/7/21 09:12





 


 Ondansetron HCl


  (ZOFRAN


 INJection)  4 mg  Q6HP  PRN


 IV


 NAUSEA OR VOMITING  4/7/21 04:45


     





 


 Pantoprazole


 Sodium


  (Protonix)  40 mg  DAILY


 IV


   4/7/21 09:00


    4/7/21 08:46





 


 Potassium


 Chloride 10 meq/


 IV Miscellaneous


 Supplies  100 ml @ 


 100 mls/hr  0630,0730


 IV


   4/7/21 06:30


 4/7/21 10:00 DC 4/7/21 07:52





 


 Sodium Chloride  1,000 ml @ 


 125 mls/hr  Q8H


 IV


   4/7/21 09:55


    4/7/21 10:30





 


 Sodium Chloride  1,000 ml @ 


 250 mls/hr  Q4H


 IV


   4/6/21 23:43


 4/7/21 04:57 DC 4/6/21 23:43














Allergies


Coded Allergies:  


     pregabalin (Verified  Adverse Reaction, Unknown, LEG SWELLING, 12/16/20)











Erika Gaming MD             Apr 7, 2021 13:41

## 2021-04-08 VITALS — SYSTOLIC BLOOD PRESSURE: 147 MMHG | DIASTOLIC BLOOD PRESSURE: 87 MMHG

## 2021-04-08 VITALS — DIASTOLIC BLOOD PRESSURE: 87 MMHG | SYSTOLIC BLOOD PRESSURE: 143 MMHG

## 2021-04-08 VITALS — SYSTOLIC BLOOD PRESSURE: 145 MMHG | DIASTOLIC BLOOD PRESSURE: 87 MMHG

## 2021-04-08 LAB
ALBUMIN SERPL BCG-MCNC: 3.1 GM/DL (ref 3.2–5.2)
ALT SERPL W P-5'-P-CCNC: 21 U/L (ref 12–78)
BASE EXCESS BLDV CALC-SCNC: -5.5 MMOL/L (ref -2–2)
BILIRUB SERPL-MCNC: 0.3 MG/DL (ref 0.2–1)
BUN SERPL-MCNC: 18 MG/DL (ref 7–18)
BUN SERPL-MCNC: 24 MG/DL (ref 7–18)
CALCIUM SERPL-MCNC: 8.7 MG/DL (ref 8.5–10.1)
CALCIUM SERPL-MCNC: 9.2 MG/DL (ref 8.5–10.1)
CHLORIDE SERPL-SCNC: 110 MEQ/L (ref 98–107)
CHLORIDE SERPL-SCNC: 112 MEQ/L (ref 98–107)
CO2 BLDV CALC-SCNC: 20.2 MEQ/L (ref 24–28)
CO2 SERPL-SCNC: 19 MEQ/L (ref 21–32)
CO2 SERPL-SCNC: 22 MEQ/L (ref 21–32)
CREAT SERPL-MCNC: 0.6 MG/DL (ref 0.7–1.3)
CREAT SERPL-MCNC: 0.77 MG/DL (ref 0.7–1.3)
GFR SERPL CREATININE-BSD FRML MDRD: > 60 ML/MIN/{1.73_M2} (ref 56–?)
GFR SERPL CREATININE-BSD FRML MDRD: > 60 ML/MIN/{1.73_M2} (ref 56–?)
GLUCOSE SERPL-MCNC: 54 MG/DL (ref 70–100)
GLUCOSE SERPL-MCNC: 57 MG/DL (ref 70–100)
HCO3 BLDV-SCNC: 19.2 MEQ/L (ref 23–27)
HCO3 STD BLDV-SCNC: 19.8 MEQ/L
HCT VFR BLD AUTO: 30.9 % (ref 42–52)
HGB BLD-MCNC: 10.8 G/DL (ref 13.5–17.5)
MAGNESIUM SERPL-MCNC: 2.1 MG/DL (ref 1.8–2.4)
MCH RBC QN AUTO: 29 PG (ref 27–33)
MCHC RBC AUTO-ENTMCNC: 35 G/DL (ref 32–36.5)
MCV RBC AUTO: 82.8 FL (ref 80–96)
PCO2 BLDV: 34.8 MMHG (ref 38–50)
PH BLDV: 7.36 UNITS (ref 7.33–7.43)
PHOSPHATE SERPL-MCNC: 1.4 MG/DL (ref 2.5–4.9)
PLATELET # BLD AUTO: 219 10^3/UL (ref 150–450)
PO2 BLDV: 60.7 MMHG (ref 30–50)
POTASSIUM SERPL-SCNC: 3.3 MEQ/L (ref 3.5–5.1)
POTASSIUM SERPL-SCNC: 3.7 MEQ/L (ref 3.5–5.1)
PROT SERPL-MCNC: 5.2 GM/DL (ref 6.4–8.2)
RBC # BLD AUTO: 3.73 10^6/UL (ref 4.3–6.1)
SAO2 % BLDV: 92.3 % (ref 60–80)
SODIUM SERPL-SCNC: 138 MEQ/L (ref 136–145)
SODIUM SERPL-SCNC: 140 MEQ/L (ref 136–145)
WBC # BLD AUTO: 5.5 10^3/UL (ref 4–10)

## 2021-04-08 RX ADMIN — ENOXAPARIN SODIUM SCH MG: 40 INJECTION SUBCUTANEOUS at 08:43

## 2021-04-08 RX ADMIN — INSULIN LISPRO SCH UNITS: 100 INJECTION, SOLUTION INTRAVENOUS; SUBCUTANEOUS at 12:26

## 2021-04-08 RX ADMIN — ATORVASTATIN CALCIUM SCH MG: 20 TABLET, FILM COATED ORAL at 08:44

## 2021-04-08 RX ADMIN — SODIUM CHLORIDE SCH MLS/HR: 9 INJECTION, SOLUTION INTRAVENOUS at 12:26

## 2021-04-08 RX ADMIN — PROBIOTIC PRODUCT - TAB SCH EA: TAB at 08:44

## 2021-04-08 RX ADMIN — INSULIN DETEMIR SCH UNITS: 100 INJECTION, SOLUTION SUBCUTANEOUS at 08:43

## 2021-04-08 RX ADMIN — DEXTROSE MONOHYDRATE SCH MG: 50 INJECTION, SOLUTION INTRAVENOUS at 08:44

## 2021-04-08 RX ADMIN — DOXYCYCLINE HYCLATE SCH MG: 100 TABLET, COATED ORAL at 08:44

## 2021-04-08 RX ADMIN — PROBIOTIC PRODUCT - TAB SCH EA: TAB at 18:36

## 2021-04-08 RX ADMIN — INSULIN LISPRO SCH UNITS: 100 INJECTION, SOLUTION INTRAVENOUS; SUBCUTANEOUS at 17:30

## 2021-04-08 RX ADMIN — INSULIN LISPRO SCH UNITS: 100 INJECTION, SOLUTION INTRAVENOUS; SUBCUTANEOUS at 07:30

## 2021-04-08 RX ADMIN — INSULIN LISPRO SCH UNITS: 100 INJECTION, SOLUTION INTRAVENOUS; SUBCUTANEOUS at 21:00

## 2021-04-08 RX ADMIN — DULOXETINE HYDROCHLORIDE SCH MG: 30 CAPSULE, DELAYED RELEASE ORAL at 08:44

## 2021-04-08 RX ADMIN — SODIUM CHLORIDE SCH MLS/HR: 9 INJECTION, SOLUTION INTRAVENOUS at 04:40

## 2021-04-08 RX ADMIN — INSULIN DETEMIR SCH UNITS: 100 INJECTION, SOLUTION SUBCUTANEOUS at 20:43

## 2021-04-08 RX ADMIN — ASPIRIN SCH MG: 81 TABLET ORAL at 08:44

## 2021-04-08 NOTE — IPNPDOC
Date Seen


The patient was seen on 4/8/21.





Progress Note


SUBJECTIVE: 


BS on lower side of normal, poor appetite but improved slightly. Decreased 

levemir to home 18 U BID from 25 BID. PT: would benefit from one more session 

prior to d/c. Denies chest pain, n/v/d, fevers. 





OBJECTIVE: 





PHYSICAL EXAMINATION:


Vital signs: Please see below 


GENERAL: NAD, resting in bed, AAOx3 


HEENT: AT/NC. Noninjected, anicteric sclera. Pupils are reactive to light and 

accommodation. Mild conjunctival pallor.


Neck: Supple. No lymphadenopathy appreciated. No JVD 


Cardiovascular: S1S2 +, no M/R/G, NSR 


Respiratory: CTAB, no W/R/R 


GI: soft, nontender, BS + in 4 quad. 


Extremities: Bilateral lower extremity as are free of edema. No cyanosis, 

clubbing or edema 


Neurologic: CN 2-12 intact, no focal deficits 


Psychiatric: Mood and affect appropriate 





LABORATORY DATA:


Please see below.





IMAGING:


CXR 4/6/21:


Focal hazy opacity laterally in the right mid lung zone. Possible focal 

pneumonia.The lung fields are otherwise clear.  No pleural effusions. The lung 

fields were clear in Dec. 2020.





MICROBIOLOGY:


Please see below. 





ASSESSMENT:


Pt is a 60-year-old male with a notable past medical history of insulin-

dependent diabetes mellitus type 1 with 2 DKA admissions in the past 8 months, 

C. difficile infection in 8/2020, hypertension, and hyperlipidemia who was adm

itted under the care of the hospitalist service on 4/6/21 to the ICU for 

continued management of diabetic ketoacidosis, necessitating insulin drip and 

fluid resuscitation.





PLAN: 





Uncontrolled DM type II likely 2/2 to gastroenteritis, RML community acquired 

PNA


-BS improved but now lower side of normal due to poor intake


-Decreased levemir to 18 U BID, ISS, FS AC/HS, consistent carb diet, stopping 

fluids today 


-HbA1c 11.3


-Daily labs


-Tx of infections below 





Community-acquired pneumonia


-WBC wnl, afebrile


-CXR above 


-Switched to PO levofloxacin, probiotic 


-Bcx neg


-Unable to provide sputum cx 





Gastroenteritis


-Hx of C. diff infection (8 mo ago) 


-No diarrhea , n/v after admission


-Stopped IVFs, encouraging fluid hydration during day 


-Send GI panel if diarrhea returns  





Hypertension


-Stable


-Stopping fluids 


-Holding ACEi  in setting of recent acute renal failure





Hyperlipidemia


-C/w statin 





Depression


-C/w duloxetine





History of insomnia


-C/w home med 





Chronic back pain


-Stable 


-Resume home codeine-acetaminophen 





DVT prophylaxis


-SC qd lovenox





GI prophylaxis


-PPI





Resolved issues: 


DKA 


Acute renal failure likely pre-renal 2/2 dehydration from emesis and diarrhea





DISPOSITION: Tolerating diet well but appetite is not great. C/w PT until 

tomorrow, likely discharge home to f/u with PCP 4/9/21.





VS, I&O, 24H, Fishbone


Vital Signs/I&O





Vital Signs








  Date Time  Temp Pulse Resp B/P (MAP) Pulse Ox O2 Delivery O2 Flow Rate FiO2


 


4/8/21 05:15 98.2 83 17 143/87 (105) 99 Room Air  














I&O- Last 24 Hours up to 6 AM 


 


 4/8/21





 05:59


 


Intake Total 3304.5 ml


 


Output Total 1100 ml


 


Balance 2204.5 ml











Laboratory Data


24H LABS


Laboratory Tests 2


4/7/21 17:09: Bedside Glucose (Misc Panel) 87


4/7/21 19:55: Bedside Glucose (Misc Panel) 103


4/7/21 23:46: 


Blood Gas Bicarbonate Standard 19.8, Venous Blood pH 7.359, Venous Blood Partial

Pressure CO2 34.8L, Venous Blood Partial Pressure O2 60.7H, Venous Blood Total 

Carbon Dioxide 20.2L, Venous Blood HCO3 19.2L, Venous Blood Oxygen Saturation 

92.3H, Venous Blood Base Excess -5.5L, Anion Gap 9, Glomerular Filtration Rate >

60.0, Calcium Level 9.2


4/8/21 06:51: 


Anion Gap 6L, Glomerular Filtration Rate > 60.0, Calcium Level 8.7, Nucleated 

Red Blood Cells % (auto) 0.0, Total Bilirubin 0.3#, Aspartate Amino Transf 

(AST/SGOT) 24, Alanine Aminotransferase (ALT/SGPT) 21, Alkaline Phosphatase 66, 

Total Protein 5.2#L, Albumin 3.1#L, Albumin/Globulin Ratio 1.5


4/8/21 07:29: Bedside Glucose (Misc Panel) 82


4/8/21 12:10: Bedside Glucose (Misc Panel) 118H


CBC/BMP


Laboratory Tests


4/7/21 23:46








4/8/21 06:51








Microbiology





Microbiology


4/6/21 Blood Culture - Preliminary, Resulted


         No growth after 24 hours . All specim...


4/6/21 Blood Culture - Preliminary, Resulted


         No growth after 24 hours . All specim...





Current Medications





Current Medications








 Medications


  (Trade)  Dose


 Ordered  Sig/Chen


 Route


 PRN Reason  Start Time


 Stop Time Status Last Admin


Dose Admin


 


 Acetaminophen


  (Tylenol Tab)  650 mg  Q4HP  PRN


 PO


 pain or fever  4/7/21 00:05


     





 


 Aspirin


  (Ecotrin)  81 mg  DAILY


 PO


   4/7/21 09:00


    4/8/21 08:44





 


 Atorvastatin


 Calcium


  (Lipitor)  20 mg  DAILY


 PO


   4/7/21 09:00


    4/8/21 08:44





 


 Ceftriaxone


 Sodium 1 gm/


 Dextrose  50 ml @ 


 100 mls/hr  Q24H


 IV


   4/8/21 00:00


    4/8/21 00:15





 


 Dextrose


  (Dextrose 50%)  25 ml  ASDIRECTED  PRN


 IV


 SEE LABEL COMMENTS  4/7/21 09:55


     





 


 Dextrose/Sodium


 Chloride  1,000 ml @ 


 250 mls/hr  Q4H


 IV


   4/7/21 05:00


 4/7/21 09:54 DC 4/7/21 09:02





 


 Doxycycline


 Hyclate


  (Vibramycin)  100 mg  BID


 PO


   4/7/21 21:00


    4/8/21 08:44





 


 Doxycycline


 Hyclate 100 mg/


 Dextrose  100 ml @ 


 100 mls/hr  Q12H


 IV


   4/7/21 12:00


 4/7/21 13:32 DC 4/7/21 11:21





 


 Duloxetine HCl


  (Cymbalta)  60 mg  DAILY


 PO


   4/7/21 09:00


    4/8/21 08:44





 


 Enoxaparin Sodium


  (Lovenox)  40 mg  DAILY


 SC


   4/7/21 09:00


    4/8/21 08:43





 


 Glucagon


  (Glucagon)  1 mg  ASDIRECTED  PRN


 SC


 SEE LABEL COMMENTS  4/7/21 09:55


     





 


 Glucose


  (Glucose)  16 GM  ASDIRECTED  PRN


 PO


 SEE LABEL COMMENTS  4/7/21 09:55


     





 


 Home Med


  (Med Rec


 Complete!)    ASDIRECTED


 XX


   4/6/21 22:05


 4/6/21 22:09 DC  





 


 Influenza Virus


 Vaccine


  (Flublok


 Quad(Egg-Free)18y&Older


 Influenza)  0.5 ml  ASDIRECTED


 IM


   4/7/21 03:10


     





 


 Insulin Detemir


  (Levemir Insulin)  18 units  BID


 SC


   4/8/21 08:00


    4/8/21 08:43





 


 Insulin Detemir


  (Levemir Insulin)  20 units  BID


 SC


   4/7/21 21:00


 4/7/21 09:56 DC  





 


 Insulin Detemir


  (Levemir Insulin)  25 units  BID


 SC


   4/7/21 09:00


 4/8/21 07:54 DC 4/7/21 20:35





 


 Insulin Human


 Lispro


  (HumaLOG INSULIN)  SEE


 PROTOCOL


 TABLE  AC


 SC


   4/7/21 12:00


    4/8/21 12:26





 


 Insulin Human


 Lispro


  (HumaLOG INSULIN)  SEE


 PROTOCOL


 TABLE  QHS


 SC


   4/7/21 21:00


     





 


 Insulin Human


 Regular 100 unit/


 IV Miscellaneous


 Supplies  100 ml @ 


 8.182 mls/


 hr  [id number]


 IV


   4/6/21 22:38


 4/7/21 00:13 DC 4/6/21 23:28





 


 Insulin Human


 Regular 100 unit/


 IV Miscellaneous


 Supplies  100 ml @ 


 18 mls/hr  Q5H34M


 IV


   4/6/21 22:17


   Cancel  





 


 Insulin Human


 Regular 100 unit/


 IV Miscellaneous


 Supplies  100 ml @ 0


 mls/hr  Q0M


 IV


   4/7/21 00:10


 4/7/21 09:54 DC  





 


 Lactobacillus


 Acidophilus


  (Bacid)  1 ea  BIDWM


 PO


   4/7/21 18:00


    4/8/21 08:44





 


 Non-Formulary


 Medication


  (Insulin Iv Rate


 Change


 Documentation ml/


 Hr)    ASDIRECTED


 XX


   4/6/21 22:20


 5/6/21 22:19 Cancel  





 


 Non-Formulary


 Medication


  (Insulin Iv Rate


 Change


 Documentation ml/


 Hr)    ASDIRECTED


 XX


   4/6/21 22:40


 4/7/21 00:13 DC  





 


 Non-Formulary


 Medication


  (Insulin Iv Rate


 Change


 Documentation ml/


 Hr)    ASDIRECTED


 XX


   4/7/21 00:10


 4/7/21 09:54 DC 4/7/21 09:12





 


 Ondansetron HCl


  (ZOFRAN


 INJection)  4 mg  Q6HP  PRN


 IV


 NAUSEA OR VOMITING  4/7/21 04:45


     





 


 Pantoprazole


 Sodium


  (Protonix)  40 mg  DAILY


 IV


   4/7/21 09:00


    4/8/21 08:44





 


 Potassium


 Chloride 10 meq/


 IV Miscellaneous


 Supplies  100 ml @ 


 100 mls/hr  0630,0730


 IV


   4/7/21 06:30


 4/7/21 10:00 DC 4/7/21 07:52





 


 Sodium Chloride  1,000 ml @ 


 125 mls/hr  Q8H


 IV


   4/7/21 09:55


    4/8/21 12:26





 


 Sodium Chloride  1,000 ml @ 


 250 mls/hr  Q4H


 IV


   4/6/21 23:43


 4/7/21 04:57 DC 4/6/21 23:43














Allergies


Coded Allergies:  


     pregabalin (Verified  Adverse Reaction, Unknown, LEG SWELLING, 12/16/20)











Erika Gaming MD             Apr 8, 2021 15:53

## 2021-04-09 VITALS — DIASTOLIC BLOOD PRESSURE: 74 MMHG | SYSTOLIC BLOOD PRESSURE: 120 MMHG

## 2021-04-09 LAB
ALBUMIN SERPL BCG-MCNC: 3.1 GM/DL (ref 3.2–5.2)
ALT SERPL W P-5'-P-CCNC: 21 U/L (ref 12–78)
BILIRUB SERPL-MCNC: 0.5 MG/DL (ref 0.2–1)
BUN SERPL-MCNC: 8 MG/DL (ref 7–18)
CALCIUM SERPL-MCNC: 8.7 MG/DL (ref 8.5–10.1)
CHLORIDE SERPL-SCNC: 111 MEQ/L (ref 98–107)
CO2 SERPL-SCNC: 28 MEQ/L (ref 21–32)
CREAT SERPL-MCNC: 0.49 MG/DL (ref 0.7–1.3)
GFR SERPL CREATININE-BSD FRML MDRD: > 60 ML/MIN/{1.73_M2} (ref 56–?)
GLUCOSE SERPL-MCNC: 157 MG/DL (ref 70–100)
HCT VFR BLD AUTO: 32.9 % (ref 42–52)
HGB BLD-MCNC: 11.2 G/DL (ref 13.5–17.5)
MCH RBC QN AUTO: 28.9 PG (ref 27–33)
MCHC RBC AUTO-ENTMCNC: 34 G/DL (ref 32–36.5)
MCV RBC AUTO: 84.8 FL (ref 80–96)
PLATELET # BLD AUTO: 197 10^3/UL (ref 150–450)
POTASSIUM SERPL-SCNC: 3.5 MEQ/L (ref 3.5–5.1)
PROT SERPL-MCNC: 5.3 GM/DL (ref 6.4–8.2)
RBC # BLD AUTO: 3.88 10^6/UL (ref 4.3–6.1)
SODIUM SERPL-SCNC: 141 MEQ/L (ref 136–145)
WBC # BLD AUTO: 4.2 10^3/UL (ref 4–10)

## 2021-04-09 RX ADMIN — PROBIOTIC PRODUCT - TAB SCH EA: TAB at 08:49

## 2021-04-09 RX ADMIN — ATORVASTATIN CALCIUM SCH MG: 20 TABLET, FILM COATED ORAL at 08:49

## 2021-04-09 RX ADMIN — ASPIRIN SCH MG: 81 TABLET ORAL at 08:49

## 2021-04-09 RX ADMIN — DULOXETINE HYDROCHLORIDE SCH MG: 30 CAPSULE, DELAYED RELEASE ORAL at 08:49

## 2021-04-09 RX ADMIN — ENOXAPARIN SODIUM SCH MG: 40 INJECTION SUBCUTANEOUS at 08:50

## 2021-04-09 RX ADMIN — INSULIN DETEMIR SCH UNITS: 100 INJECTION, SOLUTION SUBCUTANEOUS at 08:49

## 2021-04-09 RX ADMIN — INSULIN LISPRO SCH UNITS: 100 INJECTION, SOLUTION INTRAVENOUS; SUBCUTANEOUS at 08:49

## 2021-04-09 NOTE — DS.PDOC
Discharge Summary


General


Date of Admission


Apr 6, 2021 at 23:42


Date of Discharge


4/9/21


Attending Physician:  Erika Gaming MD





Discharge Summary


HISTORY OF PRESENT ILLNESS:


Tereso is a 59yo male with notable PMHx of IDDMI with recent admissions for DKA in

August and December 2020, history of C. difficile infection (August 2020), 

hypertension, and hyperlipidemia, who presented to the ED on 4/6/21 complaining 

of watery diarrhea with small amounts of blood over the past 3 days, 2 episodes 

of reportedly light red-colored emesis this morning, and increased lethargy. 

He last ate 2 days ago (Easter Sunday), and was able to tolerate drinking some 

small amounts of fluid this morning. He reports consistently taking both his 

long acting and short acting insulin as prescribed, and last took insulin this 

morning. He reports accompanying dizziness whenever standing over the past 34 

days, as well as currently feeling as though his work of breathing is increased.

He also reports currently feeling thirsty, having chills, and experiencing 

intermittent palpitations without accompanying chest pain or chest pressure. 

Patients daughter (Denae) reports he is had some episodes of confusion over 

the past couple days. Patient denies any current pleuritic chest pain, abdominal

pain, nausea, or significant pain. Of note, patients daughter states that both 

of her children have been dealing with a gastrointestinal illness over the past 

few days and were in contact with the patient.





In the ED, he was moderately tachypneic, yet. Otherwise, his vital signs were 

stable. Initial labs showed WBC of 24,000, serum glucose of 584, serum potassium

5.5, serum sodium 125, bicarbonate 5, BUN 40, and creatinine 1.89. A venous 

blood gas resulted with pH 7.056 and PCO2 13.8. Anion gap was 31. Beta 

hydroxybutyrate was greater than 46 and serum osmolality was 322. A hemoglobin 

A1c was 11.3%Chest x-ray showed questionable infiltrate in the, and in the 

setting of his elevated white count, he was given a one-time dose of 

ceftriaxone. He was given an IV fluid bolus and started on an insulin drip. He 

was subsequently admitted to the ICU primarily for continued management of his 

diabetic ketoacidosis.





HOSPITAL COURSE: 


The patient was Insulin Drip in the ICU. Sugars improved and he was transitioned

to Levemir and insulin sliding scale, diet was advanced. Anion gap and pH had 

improved significantly. Electrolytes remain stable. He was transitioned from ICU

to [facility] without telemetry. He continued to advance his diet and 

nausea/vomiting improved. He had no episodes of diarrhea during this 

hospitalization. By 04/09/2021 the patient's blood sugar was within his normal 

range and she was tolerating his home dose of insulin fine. He was discharged 

home to continue his prior medications, encouraging hydration and to follow-up 

with his primary care provider after the weekend. He will continue with 

levofloxacin and probiotic for several more days after discharge to complete 

treatment for pneumonia.





PAST MEDICAL HISTORY:


Insulin-dependent diabetes mellitus type 1 with recent DKA admissions in August 

and December 2020


History of C. difficile infection in August 2020, subsequently treated with oral

vancomycin


Hyperlipidemia


Hypertension


Chronic back pain


Depression





PAST SURGICAL HISTORY:


Bilateral hernia repair


Laminectomy





SOCIAL HISTORY:


Patient lives alone in [place], [place].. He is not working as he is on disability. 

He formerly was a .


He is a current smoker, smoking 1 pack per day for the past 35 years.


He currently drinks alcohol, averaging a sixpack of beer per week.


He denies any current or former illegal drug use.





FAMILY HISTORY:


Father, leukemia


One brother, myocardial infarction


Two other brothers are diabetics (type I versus type II is not known)





DISCHARGE MEDS: 


Please see below 





PHYSICAL EXAMINATION:


Vital signs: Please see below 


GENERAL: NAD, resting in bed, AAOx3 


HEENT: AT/NC. Noninjected, anicteric sclera. Pupils are reactive to light and 

accommodation. Mild conjunctival pallor.


Neck: Supple. No lymphadenopathy appreciated. No JVD 


Cardiovascular: S1S2 +, no M/R/G, NSR 


Respiratory: CTAB, no W/R/R 


GI: soft, nontender, BS + in 4 quad. 


Extremities: Bilateral lower extremity as are free of edema. No cyanosis, 

clubbing or edema 


Neurologic: CN 2-12 intact, no focal deficits 


Psychiatric: Mood and affect appropriate 





LABORATORY DATA:


Please see below.





IMAGING:


CXR 4/6/21:


Focal hazy opacity laterally in the right mid lung zone. Possible focal 

pneumonia.The lung fields are otherwise clear.  No pleural effusions. The lung 

fields were clear in Dec. 2020.





MICROBIOLOGY:


Please see below. 





ASSESSMENT:


Pt is a 60-year-old male with a notable past medical history of insulin-

dependent diabetes mellitus type 1 with 2 DKA admissions in the past 8 months, 

C. difficile infection in 8/2020, hypertension, and hyperlipidemia who was 

admitted under the care of the hospitalist service on 4/6/21 to the ICU for 

continued management of diabetic ketoacidosis, necessitating insulin drip and 

fluid resuscitation.





PLAN: 





Uncontrolled DM type II likely 2/2 to gastroenteritis, RML community acquired 

PNA


-BS much improved, stable 


-D/c home today with levemir to 18 U BID, consistent carb diet, encourage 

hydration during the day 


-HbA1c 11.3


-Tx of infections below 





Community-acquired pneumonia


-C/w PO levofloxacin, probiotic at discharge 





Gastroenteritis


-Hx of C. diff infection (8 mo ago) 


-No diarrhea , n/v after admission


-On PO levofloxacin, probiotic at discharge 





Hypertension


-Stable


-C/w home meds 





Hyperlipidemia


-C/w statin 





Depression


-C/w duloxetine





History of insomnia


-C/w home med 





Chronic back pain


-Stable 


-Resume home codeine-acetaminophen 





Resolved issues: 


DKA 


Acute renal failure likely pre-renal 2/2 dehydration from emesis and diarrhea





DISPOSITION: D/c home today to f/u with PCP 





TIME SPENT ON DISCHARGE: 35 minutes.





Vital Signs/I&Os





Vital Signs








  Date Time  Temp Pulse Resp B/P (MAP) Pulse Ox O2 Delivery O2 Flow Rate FiO2


 


4/9/21 05:34 98.4 83 16 128/74 (92) 97 Room Air  














I&O- Last 24 Hours up to 6 AM 


 


 4/9/21





 06:00


 


Intake Total 3430 ml


 


Balance 3430 ml











Laboratory Data


Labs 24H


Laboratory Tests 2


4/8/21 17:08: Bedside Glucose (Misc Panel) 81


4/8/21 20:19: Bedside Glucose (Misc Panel) 200H


4/9/21 06:34: 


Nucleated Red Blood Cells % (auto) 0.0, Anion Gap 2L, Glomerular Filtration Rate

> 60.0, Calcium Level 8.7, Total Bilirubin 0.5#, Aspartate Amino Transf 

(AST/SGOT) 16, Alanine Aminotransferase (ALT/SGPT) 21, Alkaline Phosphatase 71, 

Total Protein 5.3L, Albumin 3.1L, Albumin/Globulin Ratio 1.4


CBC/BMP


Laboratory Tests


4/9/21 06:34








FSBS





Laboratory Tests








Test


 4/8/21


17:08 4/8/21


20:19 Range/Units


 


 


Bedside Glucose (Misc Panel) 81 200   MG/DL











Microbiology





Microbiology


4/6/21 Blood Culture - Preliminary, Resulted


         No Growth after 48 hours. All Specime...


4/6/21 Blood Culture - Preliminary, Resulted


         No Growth after 48 hours. All Specime...





Discharge Medications


Scheduled


Aspirin (Aspirin EC) 81 Mg Tab, 81 MG PO DAILY, (Reported)


Atorvastatin Calcium (Atorvastatin Calcium) 20 Mg Tablet, 20 MG PO DAILY, 

(Reported)


Duloxetine Hcl (Cymbalta) 60 Mg Cap, 60 MG PO DAILY, (Reported)


Duloxetine Hcl (Duloxetine HCl) 30 Mg Cap, 30 MG PO QHS, (Reported)


Insulin Glargine,Hum.rec.anlog (Basaglar Kwikpen U-100) 100 Unit/1 Ml 

Insuln.pen, 18 UNIT SC BID, (Reported)


Insulin Lispro (Admelog Solostar) 100 Unit/1 Ml Insuln.pen, 1 UNIT SC TID, 

(Reported)


   SLIDING SCALE -200 =8 UNITS    201-250 = 10 UNITS    251-300 =12 UNITS 


L.acidoph/L.bulg/B.bif/S.therm (Mayela-Bid Caplet) 1 Each Tablet, 1 EA PO BIDWM


Levofloxacin (Levofloxacin) 500 Mg Tablet, 500 MG PO DAILY@06


Lisinopril (Lisinopril) 5 Mg Tablet, 5 MG PO DAILY, (Reported)


Midodrine HCl (Midodrine HCl) 10 Mg Tablet, 10 MG PO TID, (Reported)


   0800/1200/1600 


Pantoprazole Sodium (Pantoprazole Sodium) 40 Mg Tablet.dr, 40 MG PO DAILY, 

(Reported)


Saccharomyces Boulardii (Florastor) 250 Mg Capsule, 250 MG PO BIDWM, (Reported)


Zolpidem Tartrate (Ambien) 10 Mg Tab, 10 MG PO QHS, (Reported)





Scheduled PRN


Acetaminophen with Codeine (Acetaminophen-Cod #3 Tablet) 1 Tab Tab, 2 TAB PO Q8H

PRN for PAIN, (Reported)


Meclizine HCl (Meclizine HCl) 25 Mg Tablet, 25 MG PO Q8H PRN for DIZZINESS, 

(Reported)


Ondansetron HCl (Ondansetron HCl) 4 Mg Tablet, 4 MG PO Q4H PRN for NAUSEA OR 

VOMITING, (Reported)





Allergies


Coded Allergies:  


     pregabalin (Verified  Adverse Reaction, Unknown, LEG SWELLING, 12/16/20)











Erika Gaming MD             Apr 9, 2021 16:40

## 2021-04-14 ENCOUNTER — HOSPITAL ENCOUNTER (OUTPATIENT)
Dept: HOSPITAL 53 - M WUC | Age: 61
End: 2021-04-14
Attending: PHYSICIAN ASSISTANT
Payer: COMMERCIAL

## 2021-04-14 DIAGNOSIS — J18.1: Primary | ICD-10-CM

## 2021-04-14 DIAGNOSIS — R06.02: ICD-10-CM

## 2021-04-15 NOTE — REP
INDICATION:

SOB, LOBAR PNEUMONIA, S/P HOSPITALIZATION



COMPARISON:

04/06/2021



TECHNIQUE:

PA and lateral.



FINDINGS:

The mediastinum and cardiac silhouette are normal.  The lung fields are clear and

without acute consolidation, effusion, or pneumothorax.  The skeletal structures are

intact and normal.



IMPRESSION:

No acute cardiopulmonary process.





<Electronically signed by Wade Angel > 04/15/21 [id number]

## 2022-05-09 ENCOUNTER — HOSPITAL ENCOUNTER (INPATIENT)
Dept: HOSPITAL 53 - M ED | Age: 62
LOS: 3 days | Discharge: HOME | DRG: 420 | End: 2022-05-12
Attending: STUDENT IN AN ORGANIZED HEALTH CARE EDUCATION/TRAINING PROGRAM | Admitting: INTERNAL MEDICINE
Payer: COMMERCIAL

## 2022-05-09 VITALS — SYSTOLIC BLOOD PRESSURE: 110 MMHG | DIASTOLIC BLOOD PRESSURE: 56 MMHG

## 2022-05-09 VITALS — HEIGHT: 75 IN | WEIGHT: 199.52 LBS | BODY MASS INDEX: 24.81 KG/M2

## 2022-05-09 VITALS — SYSTOLIC BLOOD PRESSURE: 120 MMHG | DIASTOLIC BLOOD PRESSURE: 67 MMHG

## 2022-05-09 VITALS — DIASTOLIC BLOOD PRESSURE: 56 MMHG | SYSTOLIC BLOOD PRESSURE: 110 MMHG

## 2022-05-09 VITALS — DIASTOLIC BLOOD PRESSURE: 74 MMHG | SYSTOLIC BLOOD PRESSURE: 116 MMHG

## 2022-05-09 VITALS — DIASTOLIC BLOOD PRESSURE: 73 MMHG | SYSTOLIC BLOOD PRESSURE: 116 MMHG

## 2022-05-09 VITALS — DIASTOLIC BLOOD PRESSURE: 58 MMHG | SYSTOLIC BLOOD PRESSURE: 144 MMHG

## 2022-05-09 VITALS — SYSTOLIC BLOOD PRESSURE: 108 MMHG | DIASTOLIC BLOOD PRESSURE: 56 MMHG

## 2022-05-09 VITALS — SYSTOLIC BLOOD PRESSURE: 135 MMHG | DIASTOLIC BLOOD PRESSURE: 74 MMHG

## 2022-05-09 VITALS — DIASTOLIC BLOOD PRESSURE: 59 MMHG | SYSTOLIC BLOOD PRESSURE: 122 MMHG

## 2022-05-09 VITALS — SYSTOLIC BLOOD PRESSURE: 108 MMHG | DIASTOLIC BLOOD PRESSURE: 59 MMHG

## 2022-05-09 DIAGNOSIS — Z88.8: ICD-10-CM

## 2022-05-09 DIAGNOSIS — E87.2: ICD-10-CM

## 2022-05-09 DIAGNOSIS — Z79.4: ICD-10-CM

## 2022-05-09 DIAGNOSIS — U07.1: ICD-10-CM

## 2022-05-09 DIAGNOSIS — Z79.82: ICD-10-CM

## 2022-05-09 DIAGNOSIS — F17.200: ICD-10-CM

## 2022-05-09 DIAGNOSIS — I10: ICD-10-CM

## 2022-05-09 DIAGNOSIS — N17.9: ICD-10-CM

## 2022-05-09 DIAGNOSIS — E78.5: ICD-10-CM

## 2022-05-09 DIAGNOSIS — Z79.899: ICD-10-CM

## 2022-05-09 DIAGNOSIS — E10.10: Primary | ICD-10-CM

## 2022-05-09 DIAGNOSIS — F10.10: ICD-10-CM

## 2022-05-09 LAB
ALBUMIN SERPL BCG-MCNC: 3.9 GM/DL (ref 3.2–5.2)
ALT SERPL W P-5'-P-CCNC: 19 U/L (ref 12–78)
AMPHETAMINES UR QL SCN: NEGATIVE
APTT BLD: 29.6 SECONDS (ref 25.9–37)
B-OH-BUTYR SERPL-MCNC: > 46 MG/DL (ref ?–2.81)
BARBITURATES UR QL SCN: NEGATIVE
BASE EXCESS BLDV CALC-SCNC: -11.7 MMOL/L (ref -2–2)
BASE EXCESS BLDV CALC-SCNC: -15.3 MMOL/L (ref -2–2)
BASE EXCESS BLDV CALC-SCNC: -22.1 MMOL/L (ref -2–2)
BASE EXCESS BLDV CALC-SCNC: -24.4 MMOL/L (ref -2–2)
BASE EXCESS BLDV CALC-SCNC: -9.5 MMOL/L (ref -2–2)
BASOPHILS # BLD AUTO: 0 10^3/UL (ref 0–0.2)
BASOPHILS # BLD AUTO: 0 10^3/UL (ref 0–0.2)
BASOPHILS NFR BLD AUTO: 0.1 % (ref 0–1)
BASOPHILS NFR BLD AUTO: 0.1 % (ref 0–1)
BENZODIAZ UR QL SCN: NEGATIVE
BILIRUB CONJ SERPL-MCNC: 0.1 MG/DL (ref 0–0.2)
BILIRUB SERPL-MCNC: 0.5 MG/DL (ref 0.2–1)
BUN SERPL-MCNC: 33 MG/DL (ref 7–18)
BUN SERPL-MCNC: 36 MG/DL (ref 7–18)
BUN SERPL-MCNC: 45 MG/DL (ref 7–18)
BUN SERPL-MCNC: 49 MG/DL (ref 7–18)
BUN SERPL-MCNC: 55 MG/DL (ref 7–18)
BZE UR QL SCN: NEGATIVE
CALCIUM SERPL-MCNC: 8 MG/DL (ref 8.8–10.2)
CALCIUM SERPL-MCNC: 8.3 MG/DL (ref 8.8–10.2)
CALCIUM SERPL-MCNC: 8.6 MG/DL (ref 8.8–10.2)
CALCIUM SERPL-MCNC: 8.7 MG/DL (ref 8.8–10.2)
CALCIUM SERPL-MCNC: 9.1 MG/DL (ref 8.8–10.2)
CANNABINOIDS UR QL SCN: NEGATIVE
CHLORIDE SERPL-SCNC: 105 MEQ/L (ref 98–107)
CHLORIDE SERPL-SCNC: 112 MEQ/L (ref 98–107)
CHLORIDE SERPL-SCNC: 114 MEQ/L (ref 98–107)
CHLORIDE SERPL-SCNC: 115 MEQ/L (ref 98–107)
CHLORIDE SERPL-SCNC: 90 MEQ/L (ref 98–107)
CK MB CFR.DF SERPL CALC: 1.83
CK MB SERPL-MCNC: 4.2 NG/ML (ref ?–3.6)
CK SERPL-CCNC: 229 U/L (ref 39–308)
CO2 BLDV CALC-SCNC: 11.9 MEQ/L (ref 24–28)
CO2 BLDV CALC-SCNC: 15.1 MEQ/L (ref 24–28)
CO2 BLDV CALC-SCNC: 16.9 MEQ/L (ref 24–28)
CO2 BLDV CALC-SCNC: 5.6 MEQ/L (ref 24–28)
CO2 BLDV CALC-SCNC: 6.1 MEQ/L (ref 24–28)
CO2 SERPL-SCNC: 12 MEQ/L (ref 21–32)
CO2 SERPL-SCNC: 17 MEQ/L (ref 21–32)
CO2 SERPL-SCNC: 19 MEQ/L (ref 21–32)
CO2 SERPL-SCNC: 7 MEQ/L (ref 21–32)
CO2 SERPL-SCNC: 8 MEQ/L (ref 21–32)
CREAT SERPL-MCNC: 0.97 MG/DL (ref 0.7–1.3)
CREAT SERPL-MCNC: 1.12 MG/DL (ref 0.7–1.3)
CREAT SERPL-MCNC: 1.3 MG/DL (ref 0.7–1.3)
CREAT SERPL-MCNC: 1.6 MG/DL (ref 0.7–1.3)
CREAT SERPL-MCNC: 1.79 MG/DL (ref 0.7–1.3)
CREAT UR-MCNC: 25.5 MG/DL
EOSINOPHIL # BLD AUTO: 0 10^3/UL (ref 0–0.5)
EOSINOPHIL # BLD AUTO: 0 10^3/UL (ref 0–0.5)
EOSINOPHIL NFR BLD AUTO: 0 % (ref 0–3)
EOSINOPHIL NFR BLD AUTO: 0 % (ref 0–3)
EST. AVERAGE GLUCOSE BLD GHB EST-MCNC: 303 MG/DL (ref 60–110)
ETHANOL SERPL-MCNC: < 0.003 % (ref 0–0.01)
GFR SERPL CREATININE-BSD FRML MDRD: 41.4 ML/MIN/{1.73_M2} (ref 49–?)
GFR SERPL CREATININE-BSD FRML MDRD: 47.2 ML/MIN/{1.73_M2} (ref 49–?)
GFR SERPL CREATININE-BSD FRML MDRD: 59.9 ML/MIN/{1.73_M2} (ref 49–?)
GFR SERPL CREATININE-BSD FRML MDRD: > 60 ML/MIN/{1.73_M2} (ref 49–?)
GFR SERPL CREATININE-BSD FRML MDRD: > 60 ML/MIN/{1.73_M2} (ref 49–?)
GLUCOSE SERPL-MCNC: 151 MG/DL (ref 70–100)
GLUCOSE SERPL-MCNC: 172 MG/DL (ref 70–100)
GLUCOSE SERPL-MCNC: 243 MG/DL (ref 70–100)
GLUCOSE SERPL-MCNC: 537 MG/DL (ref 70–100)
GLUCOSE SERPL-MCNC: 919 MG/DL (ref 70–100)
HCO3 BLDV-SCNC: 11 MEQ/L (ref 23–27)
HCO3 BLDV-SCNC: 14.1 MEQ/L (ref 23–27)
HCO3 BLDV-SCNC: 15.9 MEQ/L (ref 23–27)
HCO3 BLDV-SCNC: 5.1 MEQ/L (ref 23–27)
HCO3 BLDV-SCNC: 5.4 MEQ/L (ref 23–27)
HCO3 STD BLDV-SCNC: 12.7 MEQ/L
HCO3 STD BLDV-SCNC: 15.2 MEQ/L
HCO3 STD BLDV-SCNC: 16.8 MEQ/L
HCO3 STD BLDV-SCNC: 7.1 MEQ/L
HCO3 STD BLDV-SCNC: 8.5 MEQ/L
HCT VFR BLD AUTO: 27.1 % (ref 42–52)
HCT VFR BLD AUTO: 36.6 % (ref 42–52)
HGB BLD-MCNC: 11.8 G/DL (ref 13.5–17.5)
HGB BLD-MCNC: 9.6 G/DL (ref 13.5–17.5)
INR PPP: 1.05
LIPASE SERPL-CCNC: 76 U/L (ref 73–393)
LYMPHOCYTES # BLD AUTO: 0.7 10^3/UL (ref 1.5–5)
LYMPHOCYTES # BLD AUTO: 0.7 10^3/UL (ref 1.5–5)
LYMPHOCYTES NFR BLD AUTO: 3.3 % (ref 24–44)
LYMPHOCYTES NFR BLD AUTO: 5.2 % (ref 24–44)
MAGNESIUM SERPL-MCNC: 2.2 MG/DL (ref 1.8–2.4)
MAGNESIUM SERPL-MCNC: 2.3 MG/DL (ref 1.8–2.4)
MAGNESIUM SERPL-MCNC: 2.8 MG/DL (ref 1.8–2.4)
MCH RBC QN AUTO: 29.5 PG (ref 27–33)
MCH RBC QN AUTO: 29.5 PG (ref 27–33)
MCHC RBC AUTO-ENTMCNC: 32.2 G/DL (ref 32–36.5)
MCHC RBC AUTO-ENTMCNC: 35.4 G/DL (ref 32–36.5)
MCV RBC AUTO: 83.4 FL (ref 80–96)
MCV RBC AUTO: 91.5 FL (ref 80–96)
METHADONE UR QL SCN: NEGATIVE
MONOCYTES # BLD AUTO: 0.6 10^3/UL (ref 0–0.8)
MONOCYTES # BLD AUTO: 1 10^3/UL (ref 0–0.8)
MONOCYTES NFR BLD AUTO: 3.1 % (ref 2–8)
MONOCYTES NFR BLD AUTO: 6.8 % (ref 2–8)
NEUTROPHILS # BLD AUTO: 12.3 10^3/UL (ref 1.5–8.5)
NEUTROPHILS # BLD AUTO: 18 10^3/UL (ref 1.5–8.5)
NEUTROPHILS NFR BLD AUTO: 87.3 % (ref 36–66)
NEUTROPHILS NFR BLD AUTO: 91.3 % (ref 36–66)
OPIATES UR QL SCN: NEGATIVE
OSMOLALITY SERPL: 313 MOSM/KG (ref 275–295)
OSMOLALITY SERPL: 327 MOSM/KG (ref 275–295)
OSMOLALITY SERPL: 348 MOSM/KG (ref 275–295)
PCO2 BLDV: 15.9 MMHG (ref 38–50)
PCO2 BLDV: 22.4 MMHG (ref 38–50)
PCO2 BLDV: 27.7 MMHG (ref 38–50)
PCO2 BLDV: 31.6 MMHG (ref 38–50)
PCO2 BLDV: 32.9 MMHG (ref 38–50)
PCP UR QL SCN: NEGATIVE
PH BLDV: 7 UNITS (ref 7.33–7.43)
PH BLDV: 7.12 UNITS (ref 7.33–7.43)
PH BLDV: 7.22 UNITS (ref 7.33–7.43)
PH BLDV: 7.27 UNITS (ref 7.33–7.43)
PH BLDV: 7.3 UNITS (ref 7.33–7.43)
PHOSPHATE SERPL-MCNC: 1.3 MG/DL (ref 2.5–4.9)
PHOSPHATE SERPL-MCNC: 1.7 MG/DL (ref 2.5–4.9)
PHOSPHATE SERPL-MCNC: 1.9 MG/DL (ref 2.5–4.9)
PHOSPHATE SERPL-MCNC: 3 MG/DL (ref 2.5–4.9)
PLATELET # BLD AUTO: 336 10^3/UL (ref 150–450)
PLATELET # BLD AUTO: 455 10^3/UL (ref 150–450)
PO2 BLDV: 166.1 MMHG (ref 30–50)
PO2 BLDV: 207.5 MMHG (ref 30–50)
PO2 BLDV: 42.4 MMHG (ref 30–50)
PO2 BLDV: 73.1 MMHG (ref 30–50)
PO2 BLDV: 78 MMHG (ref 30–50)
POTASSIUM SERPL-SCNC: 3.6 MEQ/L (ref 3.5–5.1)
POTASSIUM SERPL-SCNC: 3.8 MEQ/L (ref 3.5–5.1)
POTASSIUM SERPL-SCNC: 3.8 MEQ/L (ref 3.5–5.1)
POTASSIUM SERPL-SCNC: 3.9 MEQ/L (ref 3.5–5.1)
POTASSIUM SERPL-SCNC: 5.1 MEQ/L (ref 3.5–5.1)
PROT SERPL-MCNC: 6.7 GM/DL (ref 6.4–8.2)
PROTHROMBIN TIME: 14.1 SECONDS (ref 12.7–14.5)
RBC # BLD AUTO: 3.25 10^6/UL (ref 4.3–6.1)
RBC # BLD AUTO: 4 10^6/UL (ref 4.3–6.1)
RSV RNA NPH QL NAA+PROBE: NEGATIVE
SAO2 % BLDV: 69.4 % (ref 60–80)
SAO2 % BLDV: 94.6 % (ref 60–80)
SAO2 % BLDV: 95.1 % (ref 60–80)
SAO2 % BLDV: 98.7 % (ref 60–80)
SAO2 % BLDV: 98.9 % (ref 60–80)
SODIUM SERPL-SCNC: 125 MEQ/L (ref 136–145)
SODIUM SERPL-SCNC: 134 MEQ/L (ref 136–145)
SODIUM SERPL-SCNC: 140 MEQ/L (ref 136–145)
SODIUM SERPL-SCNC: 141 MEQ/L (ref 136–145)
SODIUM SERPL-SCNC: 142 MEQ/L (ref 136–145)
SODIUM UR-SCNC: 17 MEQ/L
WBC # BLD AUTO: 14 10^3/UL (ref 4–10)
WBC # BLD AUTO: 19.8 10^3/UL (ref 4–10)

## 2022-05-09 RX ADMIN — Medication SCH: at 20:11

## 2022-05-09 RX ADMIN — Medication SCH MG: at 21:00

## 2022-05-09 RX ADMIN — SODIUM CHLORIDE SCH UNITS: 4.5 INJECTION, SOLUTION INTRAVENOUS at 15:06

## 2022-05-09 RX ADMIN — Medication SCH: at 17:13

## 2022-05-09 RX ADMIN — DEXTROSE MONOHYDRATE SCH MG: 50 INJECTION, SOLUTION INTRAVENOUS at 09:00

## 2022-05-09 RX ADMIN — ASPIRIN SCH MG: 81 TABLET ORAL at 09:00

## 2022-05-09 RX ADMIN — POTASSIUM CHLORIDE, DEXTROSE MONOHYDRATE AND SODIUM CHLORIDE SCH MLS/HR: 150; 5; 450 INJECTION, SOLUTION INTRAVENOUS at 23:56

## 2022-05-09 RX ADMIN — DULOXETINE HYDROCHLORIDE SCH MG: 30 CAPSULE, DELAYED RELEASE ORAL at 21:00

## 2022-05-09 RX ADMIN — Medication SCH: at 20:52

## 2022-05-09 RX ADMIN — INSULIN HUMAN SCH MLS/HR: 1 INJECTION, SOLUTION INTRAVENOUS at 11:24

## 2022-05-09 RX ADMIN — DULOXETINE HYDROCHLORIDE SCH MG: 30 CAPSULE, DELAYED RELEASE ORAL at 09:00

## 2022-05-09 RX ADMIN — ATORVASTATIN CALCIUM SCH MG: 20 TABLET, FILM COATED ORAL at 09:00

## 2022-05-09 RX ADMIN — Medication SCH: at 15:00

## 2022-05-09 RX ADMIN — Medication SCH: at 22:18

## 2022-05-09 RX ADMIN — POTASSIUM CHLORIDE, DEXTROSE MONOHYDRATE AND SODIUM CHLORIDE SCH MLS/HR: 150; 5; 450 INJECTION, SOLUTION INTRAVENOUS at 15:32

## 2022-05-09 RX ADMIN — Medication SCH: at 18:15

## 2022-05-09 RX ADMIN — Medication SCH: at 16:11

## 2022-05-09 RX ADMIN — SODIUM CHLORIDE SCH UNITS: 4.5 INJECTION, SOLUTION INTRAVENOUS at 22:03

## 2022-05-10 VITALS — DIASTOLIC BLOOD PRESSURE: 72 MMHG | SYSTOLIC BLOOD PRESSURE: 136 MMHG

## 2022-05-10 VITALS — SYSTOLIC BLOOD PRESSURE: 116 MMHG | DIASTOLIC BLOOD PRESSURE: 62 MMHG

## 2022-05-10 VITALS — SYSTOLIC BLOOD PRESSURE: 136 MMHG | DIASTOLIC BLOOD PRESSURE: 72 MMHG

## 2022-05-10 VITALS — SYSTOLIC BLOOD PRESSURE: 119 MMHG | DIASTOLIC BLOOD PRESSURE: 61 MMHG

## 2022-05-10 VITALS — DIASTOLIC BLOOD PRESSURE: 91 MMHG | SYSTOLIC BLOOD PRESSURE: 153 MMHG

## 2022-05-10 VITALS — SYSTOLIC BLOOD PRESSURE: 162 MMHG | DIASTOLIC BLOOD PRESSURE: 82 MMHG

## 2022-05-10 VITALS — SYSTOLIC BLOOD PRESSURE: 130 MMHG | DIASTOLIC BLOOD PRESSURE: 69 MMHG

## 2022-05-10 VITALS — SYSTOLIC BLOOD PRESSURE: 149 MMHG | DIASTOLIC BLOOD PRESSURE: 72 MMHG

## 2022-05-10 VITALS — DIASTOLIC BLOOD PRESSURE: 72 MMHG | SYSTOLIC BLOOD PRESSURE: 144 MMHG

## 2022-05-10 VITALS — DIASTOLIC BLOOD PRESSURE: 77 MMHG | SYSTOLIC BLOOD PRESSURE: 141 MMHG

## 2022-05-10 VITALS — DIASTOLIC BLOOD PRESSURE: 68 MMHG | SYSTOLIC BLOOD PRESSURE: 126 MMHG

## 2022-05-10 LAB
ALBUMIN SERPL BCG-MCNC: 3 GM/DL (ref 3.2–5.2)
ALT SERPL W P-5'-P-CCNC: 16 U/L (ref 12–78)
BASE EXCESS BLDV CALC-SCNC: -7.2 MMOL/L (ref -2–2)
BASOPHILS # BLD AUTO: 0 10^3/UL (ref 0–0.2)
BASOPHILS NFR BLD AUTO: 0 % (ref 0–1)
BILIRUB SERPL-MCNC: 0.3 MG/DL (ref 0.2–1)
BUN SERPL-MCNC: 26 MG/DL (ref 7–18)
BUN SERPL-MCNC: 29 MG/DL (ref 7–18)
BUN SERPL-MCNC: 29 MG/DL (ref 7–18)
CALCIUM SERPL-MCNC: 7.8 MG/DL (ref 8.8–10.2)
CALCIUM SERPL-MCNC: 8 MG/DL (ref 8.8–10.2)
CALCIUM SERPL-MCNC: 8.1 MG/DL (ref 8.8–10.2)
CHLORIDE SERPL-SCNC: 113 MEQ/L (ref 98–107)
CHLORIDE SERPL-SCNC: 114 MEQ/L (ref 98–107)
CHLORIDE SERPL-SCNC: 115 MEQ/L (ref 98–107)
CO2 BLDV CALC-SCNC: 18 MEQ/L (ref 24–28)
CO2 SERPL-SCNC: 18 MEQ/L (ref 21–32)
CO2 SERPL-SCNC: 20 MEQ/L (ref 21–32)
CO2 SERPL-SCNC: 20 MEQ/L (ref 21–32)
CREAT SERPL-MCNC: 0.83 MG/DL (ref 0.7–1.3)
CREAT SERPL-MCNC: 0.85 MG/DL (ref 0.7–1.3)
CREAT SERPL-MCNC: 0.85 MG/DL (ref 0.7–1.3)
EOSINOPHIL # BLD AUTO: 0 10^3/UL (ref 0–0.5)
EOSINOPHIL NFR BLD AUTO: 0.1 % (ref 0–3)
GFR SERPL CREATININE-BSD FRML MDRD: > 60 ML/MIN/{1.73_M2} (ref 49–?)
GLUCOSE SERPL-MCNC: 160 MG/DL (ref 70–100)
GLUCOSE SERPL-MCNC: 169 MG/DL (ref 70–100)
GLUCOSE SERPL-MCNC: 172 MG/DL (ref 70–100)
HCO3 BLDV-SCNC: 17.1 MEQ/L (ref 23–27)
HCO3 STD BLDV-SCNC: 18.6 MEQ/L
HCT VFR BLD AUTO: 25.8 % (ref 42–52)
HGB BLD-MCNC: 9.2 G/DL (ref 13.5–17.5)
LYMPHOCYTES # BLD AUTO: 0.8 10^3/UL (ref 1.5–5)
LYMPHOCYTES NFR BLD AUTO: 6.8 % (ref 24–44)
MAGNESIUM SERPL-MCNC: 2 MG/DL (ref 1.8–2.4)
MAGNESIUM SERPL-MCNC: 2 MG/DL (ref 1.8–2.4)
MAGNESIUM SERPL-MCNC: 2.1 MG/DL (ref 1.8–2.4)
MCH RBC QN AUTO: 29.5 PG (ref 27–33)
MCHC RBC AUTO-ENTMCNC: 35.7 G/DL (ref 32–36.5)
MCV RBC AUTO: 82.7 FL (ref 80–96)
MONOCYTES # BLD AUTO: 0.9 10^3/UL (ref 0–0.8)
MONOCYTES NFR BLD AUTO: 7.9 % (ref 2–8)
NEUTROPHILS # BLD AUTO: 9.4 10^3/UL (ref 1.5–8.5)
NEUTROPHILS NFR BLD AUTO: 84.6 % (ref 36–66)
PCO2 BLDV: 30.2 MMHG (ref 38–50)
PH BLDV: 7.37 UNITS (ref 7.33–7.43)
PHOSPHATE SERPL-MCNC: 1.6 MG/DL (ref 2.5–4.9)
PHOSPHATE SERPL-MCNC: 2 MG/DL (ref 2.5–4.9)
PHOSPHATE SERPL-MCNC: 2.1 MG/DL (ref 2.5–4.9)
PLATELET # BLD AUTO: 318 10^3/UL (ref 150–450)
PO2 BLDV: 190.1 MMHG (ref 30–50)
POTASSIUM SERPL-SCNC: 3.7 MEQ/L (ref 3.5–5.1)
POTASSIUM SERPL-SCNC: 3.9 MEQ/L (ref 3.5–5.1)
POTASSIUM SERPL-SCNC: 4.1 MEQ/L (ref 3.5–5.1)
PROT SERPL-MCNC: 5.2 GM/DL (ref 6.4–8.2)
RBC # BLD AUTO: 3.12 10^6/UL (ref 4.3–6.1)
SAO2 % BLDV: 98.9 % (ref 60–80)
SODIUM SERPL-SCNC: 139 MEQ/L (ref 136–145)
SODIUM SERPL-SCNC: 140 MEQ/L (ref 136–145)
SODIUM SERPL-SCNC: 142 MEQ/L (ref 136–145)
WBC # BLD AUTO: 11.2 10^3/UL (ref 4–10)

## 2022-05-10 RX ADMIN — Medication SCH: at 06:00

## 2022-05-10 RX ADMIN — Medication SCH: at 05:06

## 2022-05-10 RX ADMIN — Medication SCH: at 01:09

## 2022-05-10 RX ADMIN — Medication SCH: at 02:08

## 2022-05-10 RX ADMIN — SODIUM CHLORIDE SCH UNITS: 4.5 INJECTION, SOLUTION INTRAVENOUS at 22:00

## 2022-05-10 RX ADMIN — Medication SCH: at 23:57

## 2022-05-10 RX ADMIN — Medication SCH: at 06:52

## 2022-05-10 RX ADMIN — Medication SCH: at 20:07

## 2022-05-10 RX ADMIN — ACETAMINOPHEN AND CODEINE PHOSPHATE PRN EA: 300; 30 TABLET ORAL at 05:42

## 2022-05-10 RX ADMIN — DEXTROSE MONOHYDRATE SCH MG: 50 INJECTION, SOLUTION INTRAVENOUS at 08:28

## 2022-05-10 RX ADMIN — Medication SCH: at 21:11

## 2022-05-10 RX ADMIN — SODIUM CHLORIDE SCH UNITS: 4.5 INJECTION, SOLUTION INTRAVENOUS at 06:07

## 2022-05-10 RX ADMIN — SUCRALFATE SCH GM: 1 SUSPENSION ORAL at 17:00

## 2022-05-10 RX ADMIN — MORPHINE SULFATE PRN MG: 4 INJECTION INTRAVENOUS at 21:13

## 2022-05-10 RX ADMIN — FOLIC ACID SCH MG: 1 TABLET ORAL at 11:53

## 2022-05-10 RX ADMIN — ATORVASTATIN CALCIUM SCH MG: 20 TABLET, FILM COATED ORAL at 11:52

## 2022-05-10 RX ADMIN — SODIUM CHLORIDE SCH MLS/HR: 9 INJECTION, SOLUTION INTRAVENOUS at 14:43

## 2022-05-10 RX ADMIN — MULTIPLE VITAMINS W/ MINERALS TAB SCH TAB: TAB at 11:52

## 2022-05-10 RX ADMIN — Medication SCH: at 17:55

## 2022-05-10 RX ADMIN — INSULIN LISPRO SCH UNITS: 100 INJECTION, SOLUTION INTRAVENOUS; SUBCUTANEOUS at 21:00

## 2022-05-10 RX ADMIN — SODIUM CHLORIDE SCH UNITS: 4.5 INJECTION, SOLUTION INTRAVENOUS at 13:39

## 2022-05-10 RX ADMIN — POTASSIUM CHLORIDE, DEXTROSE MONOHYDRATE AND SODIUM CHLORIDE SCH MLS/HR: 150; 5; 450 INJECTION, SOLUTION INTRAVENOUS at 07:08

## 2022-05-10 RX ADMIN — Medication SCH: at 08:27

## 2022-05-10 RX ADMIN — POTASSIUM CHLORIDE, DEXTROSE MONOHYDRATE AND SODIUM CHLORIDE SCH MLS/HR: 150; 5; 450 INJECTION, SOLUTION INTRAVENOUS at 13:39

## 2022-05-10 RX ADMIN — Medication SCH: at 14:35

## 2022-05-10 RX ADMIN — POTASSIUM CHLORIDE, DEXTROSE MONOHYDRATE AND SODIUM CHLORIDE SCH MLS/HR: 150; 5; 450 INJECTION, SOLUTION INTRAVENOUS at 17:50

## 2022-05-10 RX ADMIN — DULOXETINE HYDROCHLORIDE SCH MG: 30 CAPSULE, DELAYED RELEASE ORAL at 21:15

## 2022-05-10 RX ADMIN — ASPIRIN SCH MG: 81 TABLET ORAL at 11:52

## 2022-05-10 RX ADMIN — INSULIN HUMAN SCH MLS/HR: 1 INJECTION, SOLUTION INTRAVENOUS at 14:49

## 2022-05-10 RX ADMIN — Medication SCH MG: at 11:52

## 2022-05-10 RX ADMIN — SODIUM CHLORIDE SCH ML: 9 INJECTION, SOLUTION INTRAMUSCULAR; INTRAVENOUS; SUBCUTANEOUS at 16:00

## 2022-05-10 RX ADMIN — DULOXETINE HYDROCHLORIDE SCH MG: 30 CAPSULE, DELAYED RELEASE ORAL at 11:53

## 2022-05-10 RX ADMIN — Medication SCH: at 21:59

## 2022-05-10 RX ADMIN — Medication SCH: at 11:39

## 2022-05-10 RX ADMIN — Medication SCH: at 03:05

## 2022-05-10 RX ADMIN — POTASSIUM CHLORIDE, DEXTROSE MONOHYDRATE AND SODIUM CHLORIDE SCH MLS/HR: 150; 5; 450 INJECTION, SOLUTION INTRAVENOUS at 20:29

## 2022-05-10 RX ADMIN — Medication SCH MG: at 21:15

## 2022-05-11 VITALS — SYSTOLIC BLOOD PRESSURE: 171 MMHG | DIASTOLIC BLOOD PRESSURE: 92 MMHG

## 2022-05-11 VITALS — DIASTOLIC BLOOD PRESSURE: 87 MMHG | SYSTOLIC BLOOD PRESSURE: 146 MMHG

## 2022-05-11 VITALS — SYSTOLIC BLOOD PRESSURE: 143 MMHG | DIASTOLIC BLOOD PRESSURE: 81 MMHG

## 2022-05-11 VITALS — DIASTOLIC BLOOD PRESSURE: 66 MMHG | SYSTOLIC BLOOD PRESSURE: 134 MMHG

## 2022-05-11 VITALS — SYSTOLIC BLOOD PRESSURE: 178 MMHG | DIASTOLIC BLOOD PRESSURE: 88 MMHG

## 2022-05-11 LAB
ALBUMIN SERPL BCG-MCNC: 2.7 GM/DL (ref 3.2–5.2)
ALT SERPL W P-5'-P-CCNC: 16 U/L (ref 12–78)
BASOPHILS # BLD AUTO: 0 10^3/UL (ref 0–0.2)
BASOPHILS NFR BLD AUTO: 0.2 % (ref 0–1)
BILIRUB SERPL-MCNC: 0.5 MG/DL (ref 0.2–1)
BUN SERPL-MCNC: 12 MG/DL (ref 7–18)
CALCIUM SERPL-MCNC: 7.7 MG/DL (ref 8.8–10.2)
CHLORIDE SERPL-SCNC: 111 MEQ/L (ref 98–107)
CO2 SERPL-SCNC: 22 MEQ/L (ref 21–32)
CREAT SERPL-MCNC: 0.56 MG/DL (ref 0.7–1.3)
EOSINOPHIL # BLD AUTO: 0 10^3/UL (ref 0–0.5)
EOSINOPHIL NFR BLD AUTO: 0.3 % (ref 0–3)
GFR SERPL CREATININE-BSD FRML MDRD: > 60 ML/MIN/{1.73_M2} (ref 49–?)
GLUCOSE SERPL-MCNC: 172 MG/DL (ref 70–100)
HCT VFR BLD AUTO: 27.2 % (ref 42–52)
HGB BLD-MCNC: 9.5 G/DL (ref 13.5–17.5)
LYMPHOCYTES # BLD AUTO: 1.6 10^3/UL (ref 1.5–5)
LYMPHOCYTES NFR BLD AUTO: 25.3 % (ref 24–44)
MAGNESIUM SERPL-MCNC: 2 MG/DL (ref 1.8–2.4)
MCH RBC QN AUTO: 28.7 PG (ref 27–33)
MCHC RBC AUTO-ENTMCNC: 34.9 G/DL (ref 32–36.5)
MCV RBC AUTO: 82.2 FL (ref 80–96)
MONOCYTES # BLD AUTO: 0.6 10^3/UL (ref 0–0.8)
MONOCYTES NFR BLD AUTO: 9.4 % (ref 2–8)
NEUTROPHILS # BLD AUTO: 4.1 10^3/UL (ref 1.5–8.5)
NEUTROPHILS NFR BLD AUTO: 64.5 % (ref 36–66)
PLATELET # BLD AUTO: 264 10^3/UL (ref 150–450)
POTASSIUM SERPL-SCNC: 3.5 MEQ/L (ref 3.5–5.1)
PROT SERPL-MCNC: 5 GM/DL (ref 6.4–8.2)
RBC # BLD AUTO: 3.31 10^6/UL (ref 4.3–6.1)
SODIUM SERPL-SCNC: 137 MEQ/L (ref 136–145)
WBC # BLD AUTO: 6.4 10^3/UL (ref 4–10)

## 2022-05-11 RX ADMIN — POTASSIUM CHLORIDE, DEXTROSE MONOHYDRATE AND SODIUM CHLORIDE SCH MLS/HR: 150; 5; 450 INJECTION, SOLUTION INTRAVENOUS at 03:20

## 2022-05-11 RX ADMIN — MORPHINE SULFATE PRN MG: 4 INJECTION INTRAVENOUS at 23:30

## 2022-05-11 RX ADMIN — ASPIRIN SCH MG: 81 TABLET ORAL at 08:08

## 2022-05-11 RX ADMIN — MORPHINE SULFATE PRN MG: 4 INJECTION INTRAVENOUS at 09:12

## 2022-05-11 RX ADMIN — Medication SCH: at 05:58

## 2022-05-11 RX ADMIN — Medication SCH: at 08:10

## 2022-05-11 RX ADMIN — SUCRALFATE SCH GM: 1 SUSPENSION ORAL at 00:09

## 2022-05-11 RX ADMIN — SUCRALFATE SCH GM: 1 SUSPENSION ORAL at 12:25

## 2022-05-11 RX ADMIN — MORPHINE SULFATE PRN MG: 4 INJECTION INTRAVENOUS at 04:05

## 2022-05-11 RX ADMIN — Medication SCH MG: at 20:06

## 2022-05-11 RX ADMIN — DULOXETINE HYDROCHLORIDE SCH MG: 30 CAPSULE, DELAYED RELEASE ORAL at 08:08

## 2022-05-11 RX ADMIN — SODIUM CHLORIDE SCH MLS/HR: 9 INJECTION, SOLUTION INTRAVENOUS at 14:08

## 2022-05-11 RX ADMIN — ACETAMINOPHEN AND CODEINE PHOSPHATE PRN EA: 300; 30 TABLET ORAL at 20:36

## 2022-05-11 RX ADMIN — DEXTROSE MONOHYDRATE SCH MG: 50 INJECTION, SOLUTION INTRAVENOUS at 03:20

## 2022-05-11 RX ADMIN — ACETAMINOPHEN AND CODEINE PHOSPHATE PRN EA: 300; 30 TABLET ORAL at 08:12

## 2022-05-11 RX ADMIN — Medication SCH MG: at 08:08

## 2022-05-11 RX ADMIN — Medication SCH: at 03:14

## 2022-05-11 RX ADMIN — MORPHINE SULFATE PRN MG: 4 INJECTION INTRAVENOUS at 00:09

## 2022-05-11 RX ADMIN — MORPHINE SULFATE PRN MG: 4 INJECTION INTRAVENOUS at 17:09

## 2022-05-11 RX ADMIN — INSULIN LISPRO SCH UNITS: 100 INJECTION, SOLUTION INTRAVENOUS; SUBCUTANEOUS at 17:17

## 2022-05-11 RX ADMIN — FOLIC ACID SCH MG: 1 TABLET ORAL at 08:08

## 2022-05-11 RX ADMIN — DEXTROSE MONOHYDRATE SCH MG: 50 INJECTION, SOLUTION INTRAVENOUS at 14:08

## 2022-05-11 RX ADMIN — SUCRALFATE SCH GM: 1 SUSPENSION ORAL at 20:05

## 2022-05-11 RX ADMIN — ACETAMINOPHEN AND CODEINE PHOSPHATE PRN EA: 300; 30 TABLET ORAL at 14:10

## 2022-05-11 RX ADMIN — SODIUM CHLORIDE SCH UNITS: 4.5 INJECTION, SOLUTION INTRAVENOUS at 20:05

## 2022-05-11 RX ADMIN — SODIUM CHLORIDE SCH ML: 9 INJECTION, SOLUTION INTRAMUSCULAR; INTRAVENOUS; SUBCUTANEOUS at 14:09

## 2022-05-11 RX ADMIN — ATORVASTATIN CALCIUM SCH MG: 20 TABLET, FILM COATED ORAL at 08:08

## 2022-05-11 RX ADMIN — Medication SCH: at 05:12

## 2022-05-11 RX ADMIN — MULTIPLE VITAMINS W/ MINERALS TAB SCH TAB: TAB at 08:08

## 2022-05-11 RX ADMIN — DULOXETINE HYDROCHLORIDE SCH MG: 30 CAPSULE, DELAYED RELEASE ORAL at 20:06

## 2022-05-11 RX ADMIN — SODIUM CHLORIDE SCH UNITS: 4.5 INJECTION, SOLUTION INTRAVENOUS at 14:08

## 2022-05-11 RX ADMIN — INSULIN LISPRO SCH UNITS: 100 INJECTION, SOLUTION INTRAVENOUS; SUBCUTANEOUS at 12:25

## 2022-05-11 RX ADMIN — INSULIN LISPRO SCH UNITS: 100 INJECTION, SOLUTION INTRAVENOUS; SUBCUTANEOUS at 20:06

## 2022-05-11 RX ADMIN — SUCRALFATE SCH GM: 1 SUSPENSION ORAL at 17:09

## 2022-05-11 RX ADMIN — INSULIN LISPRO SCH UNITS: 100 INJECTION, SOLUTION INTRAVENOUS; SUBCUTANEOUS at 07:08

## 2022-05-11 RX ADMIN — SODIUM CHLORIDE SCH UNITS: 4.5 INJECTION, SOLUTION INTRAVENOUS at 06:44

## 2022-05-11 RX ADMIN — SUCRALFATE SCH GM: 1 SUSPENSION ORAL at 06:44

## 2022-05-11 RX ADMIN — Medication SCH: at 04:06

## 2022-05-12 VITALS — SYSTOLIC BLOOD PRESSURE: 143 MMHG | DIASTOLIC BLOOD PRESSURE: 84 MMHG

## 2022-05-12 VITALS — DIASTOLIC BLOOD PRESSURE: 88 MMHG | SYSTOLIC BLOOD PRESSURE: 166 MMHG

## 2022-05-12 LAB
ALBUMIN SERPL BCG-MCNC: 2.9 GM/DL (ref 3.2–5.2)
ALT SERPL W P-5'-P-CCNC: 21 U/L (ref 12–78)
BASOPHILS # BLD AUTO: 0 10^3/UL (ref 0–0.2)
BASOPHILS NFR BLD AUTO: 0.5 % (ref 0–1)
BILIRUB SERPL-MCNC: 0.5 MG/DL (ref 0.2–1)
BUN SERPL-MCNC: 10 MG/DL (ref 7–18)
CALCIUM SERPL-MCNC: 8.2 MG/DL (ref 8.8–10.2)
CHLORIDE SERPL-SCNC: 109 MEQ/L (ref 98–107)
CO2 SERPL-SCNC: 27 MEQ/L (ref 21–32)
CREAT SERPL-MCNC: 0.42 MG/DL (ref 0.7–1.3)
EOSINOPHIL # BLD AUTO: 0.1 10^3/UL (ref 0–0.5)
EOSINOPHIL NFR BLD AUTO: 1.5 % (ref 0–3)
GFR SERPL CREATININE-BSD FRML MDRD: > 60 ML/MIN/{1.73_M2} (ref 49–?)
GLUCOSE SERPL-MCNC: 108 MG/DL (ref 70–100)
HCT VFR BLD AUTO: 29.2 % (ref 42–52)
HGB BLD-MCNC: 10.3 G/DL (ref 13.5–17.5)
LYMPHOCYTES # BLD AUTO: 1.6 10^3/UL (ref 1.5–5)
LYMPHOCYTES NFR BLD AUTO: 38.2 % (ref 24–44)
MAGNESIUM SERPL-MCNC: 2.3 MG/DL (ref 1.8–2.4)
MCH RBC QN AUTO: 28.9 PG (ref 27–33)
MCHC RBC AUTO-ENTMCNC: 35.3 G/DL (ref 32–36.5)
MCV RBC AUTO: 82 FL (ref 80–96)
MONOCYTES # BLD AUTO: 0.5 10^3/UL (ref 0–0.8)
MONOCYTES NFR BLD AUTO: 12.6 % (ref 2–8)
NEUTROPHILS # BLD AUTO: 1.9 10^3/UL (ref 1.5–8.5)
NEUTROPHILS NFR BLD AUTO: 47 % (ref 36–66)
PLATELET # BLD AUTO: 232 10^3/UL (ref 150–450)
POTASSIUM SERPL-SCNC: 3.5 MEQ/L (ref 3.5–5.1)
PROT SERPL-MCNC: 5.1 GM/DL (ref 6.4–8.2)
RBC # BLD AUTO: 3.56 10^6/UL (ref 4.3–6.1)
SODIUM SERPL-SCNC: 139 MEQ/L (ref 136–145)
WBC # BLD AUTO: 4.1 10^3/UL (ref 4–10)

## 2022-05-12 RX ADMIN — ASPIRIN SCH MG: 81 TABLET ORAL at 07:40

## 2022-05-12 RX ADMIN — SUCRALFATE SCH GM: 1 SUSPENSION ORAL at 05:41

## 2022-05-12 RX ADMIN — DEXTROSE MONOHYDRATE SCH MG: 50 INJECTION, SOLUTION INTRAVENOUS at 00:56

## 2022-05-12 RX ADMIN — MORPHINE SULFATE PRN MG: 4 INJECTION INTRAVENOUS at 00:02

## 2022-05-12 RX ADMIN — INSULIN LISPRO SCH UNITS: 100 INJECTION, SOLUTION INTRAVENOUS; SUBCUTANEOUS at 12:00

## 2022-05-12 RX ADMIN — Medication SCH MG: at 07:41

## 2022-05-12 RX ADMIN — SODIUM CHLORIDE SCH UNITS: 4.5 INJECTION, SOLUTION INTRAVENOUS at 05:42

## 2022-05-12 RX ADMIN — MULTIPLE VITAMINS W/ MINERALS TAB SCH TAB: TAB at 07:40

## 2022-05-12 RX ADMIN — DULOXETINE HYDROCHLORIDE SCH MG: 30 CAPSULE, DELAYED RELEASE ORAL at 07:41

## 2022-05-12 RX ADMIN — ATORVASTATIN CALCIUM SCH MG: 20 TABLET, FILM COATED ORAL at 07:41

## 2022-05-12 RX ADMIN — SUCRALFATE SCH GM: 1 SUSPENSION ORAL at 12:00

## 2022-05-12 RX ADMIN — ACETAMINOPHEN AND CODEINE PHOSPHATE PRN EA: 300; 30 TABLET ORAL at 07:42

## 2022-05-12 RX ADMIN — INSULIN LISPRO SCH UNITS: 100 INJECTION, SOLUTION INTRAVENOUS; SUBCUTANEOUS at 07:40

## 2022-05-12 RX ADMIN — FOLIC ACID SCH MG: 1 TABLET ORAL at 07:40

## 2022-09-13 ENCOUNTER — HOSPITAL ENCOUNTER (OUTPATIENT)
Dept: HOSPITAL 53 - M RAD | Age: 62
End: 2022-09-13
Attending: PHYSICIAN ASSISTANT
Payer: COMMERCIAL

## 2022-09-13 DIAGNOSIS — R07.9: Primary | ICD-10-CM

## 2022-12-30 ENCOUNTER — HOSPITAL ENCOUNTER (EMERGENCY)
Dept: HOSPITAL 53 - M ED | Age: 62
Discharge: LEFT BEFORE DISCHARGE | End: 2022-12-30
Payer: COMMERCIAL

## 2022-12-30 DIAGNOSIS — Z53.21: Primary | ICD-10-CM

## 2023-01-09 ENCOUNTER — HOSPITAL ENCOUNTER (EMERGENCY)
Dept: HOSPITAL 53 - M ED | Age: 63
Discharge: HOME | End: 2023-01-09
Payer: COMMERCIAL

## 2023-01-09 VITALS — DIASTOLIC BLOOD PRESSURE: 90 MMHG | SYSTOLIC BLOOD PRESSURE: 146 MMHG

## 2023-01-09 VITALS — WEIGHT: 174.61 LBS | HEIGHT: 75 IN | BODY MASS INDEX: 21.71 KG/M2

## 2023-01-09 DIAGNOSIS — E78.5: ICD-10-CM

## 2023-01-09 DIAGNOSIS — F12.10: ICD-10-CM

## 2023-01-09 DIAGNOSIS — Z79.4: ICD-10-CM

## 2023-01-09 DIAGNOSIS — Z79.82: ICD-10-CM

## 2023-01-09 DIAGNOSIS — Z88.8: ICD-10-CM

## 2023-01-09 DIAGNOSIS — Z79.899: ICD-10-CM

## 2023-01-09 DIAGNOSIS — I10: ICD-10-CM

## 2023-01-09 DIAGNOSIS — F17.200: ICD-10-CM

## 2023-01-09 DIAGNOSIS — E10.65: Primary | ICD-10-CM

## 2023-01-09 DIAGNOSIS — Z79.811: ICD-10-CM

## 2023-01-09 LAB
ALBUMIN SERPL BCG-MCNC: 4.3 G/DL (ref 3.2–5.2)
ALP SERPL-CCNC: 96 U/L (ref 46–116)
ALT SERPL W P-5'-P-CCNC: 17 U/L (ref 7–40)
AST SERPL-CCNC: 15 U/L (ref ?–34)
B-OH-BUTYR SERPL-MCNC: 1.2 MMOL/L (ref 0.02–0.27)
BASE EXCESS BLDV CALC-SCNC: -0.4 MMOL/L (ref -2–2)
BASOPHILS # BLD AUTO: 0.1 10^3/UL (ref 0–0.2)
BASOPHILS NFR BLD AUTO: 0.9 % (ref 0–1)
BILIRUB SERPL-MCNC: 0.4 MG/DL (ref 0.3–1.2)
BUN SERPL-MCNC: 13 MG/DL (ref 9–23)
CALCIUM SERPL-MCNC: 10.2 MG/DL (ref 8.3–10.6)
CHLORIDE SERPL-SCNC: 99 MMOL/L (ref 98–107)
CK MB CFR.DF SERPL CALC: 2.12
CK MB SERPL-MCNC: < 1 NG/ML (ref ?–3.6)
CK SERPL-CCNC: 47 U/L (ref 46–171)
CO2 BLDV CALC-SCNC: 27.4 MEQ/L (ref 24–28)
CO2 SERPL-SCNC: 28 MMOL/L (ref 20–31)
CREAT SERPL-MCNC: 0.77 MG/DL (ref 0.7–1.3)
EOSINOPHIL # BLD AUTO: 0.1 10^3/UL (ref 0–0.5)
EOSINOPHIL NFR BLD AUTO: 1.9 % (ref 0–3)
EST. AVERAGE GLUCOSE BLD GHB EST-MCNC: 255 MG/DL (ref 60–110)
GFR SERPL CREATININE-BSD FRML MDRD: > 60 ML/MIN/{1.73_M2} (ref 49–?)
GLUCOSE SERPL-MCNC: 319 MG/DL (ref 74–106)
HCO3 BLDV-SCNC: 25.9 MEQ/L (ref 23–27)
HCO3 STD BLDV-SCNC: 23.5 MEQ/L
HCT VFR BLD AUTO: 34.5 % (ref 42–52)
HGB BLD-MCNC: 11.3 G/DL (ref 13.5–17.5)
LIPASE SERPL-CCNC: 21 U/L (ref 12–53)
LYMPHOCYTES # BLD AUTO: 2.3 10^3/UL (ref 1.5–5)
LYMPHOCYTES NFR BLD AUTO: 42.4 % (ref 24–44)
MCH RBC QN AUTO: 28.5 PG (ref 27–33)
MCHC RBC AUTO-ENTMCNC: 32.8 G/DL (ref 32–36.5)
MCV RBC AUTO: 86.9 FL (ref 80–96)
MONOCYTES # BLD AUTO: 0.5 10^3/UL (ref 0–0.8)
MONOCYTES NFR BLD AUTO: 8.8 % (ref 2–8)
NEUTROPHILS # BLD AUTO: 2.5 10^3/UL (ref 1.5–8.5)
NEUTROPHILS NFR BLD AUTO: 45.8 % (ref 36–66)
PCO2 BLDV: 49.6 MMHG (ref 38–50)
PH BLDV: 7.34 UNITS (ref 7.33–7.43)
PLATELET # BLD AUTO: 438 10^3/UL (ref 150–450)
PO2 BLDV: 36 MMHG (ref 30–50)
POTASSIUM SERPL-SCNC: 4.9 MMOL/L (ref 3.5–5.1)
PROT SERPL-MCNC: 6.7 G/DL (ref 5.7–8.2)
RBC # BLD AUTO: 3.97 10^6/UL (ref 4.3–6.1)
SAO2 % BLDV: 67.8 % (ref 60–80)
SODIUM SERPL-SCNC: 135 MMOL/L (ref 136–145)
WBC # BLD AUTO: 5.4 10^3/UL (ref 4–10)

## 2023-01-17 ENCOUNTER — HOSPITAL ENCOUNTER (OUTPATIENT)
Dept: HOSPITAL 53 - M WUC | Age: 63
End: 2023-01-17
Attending: PHYSICIAN ASSISTANT
Payer: COMMERCIAL
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
## 2023-01-17 DIAGNOSIS — S30.861A: Primary | ICD-10-CM

## 2023-01-25 ENCOUNTER — HOSPITAL ENCOUNTER (EMERGENCY)
Dept: HOSPITAL 53 - M ED | Age: 63
Discharge: LEFT BEFORE DISCHARGE | End: 2023-01-25
Payer: COMMERCIAL

## 2023-01-25 VITALS — DIASTOLIC BLOOD PRESSURE: 56 MMHG | SYSTOLIC BLOOD PRESSURE: 96 MMHG

## 2023-01-25 VITALS — BODY MASS INDEX: 21.98 KG/M2 | HEIGHT: 75 IN | WEIGHT: 176.81 LBS

## 2023-01-25 DIAGNOSIS — Z53.21: Primary | ICD-10-CM

## 2023-02-07 ENCOUNTER — HOSPITAL ENCOUNTER (OUTPATIENT)
Dept: HOSPITAL 53 - M RAD | Age: 63
End: 2023-02-07
Attending: PHYSICIAN ASSISTANT
Payer: COMMERCIAL

## 2023-02-07 DIAGNOSIS — R10.9: Primary | ICD-10-CM

## 2023-03-15 ENCOUNTER — HOSPITAL ENCOUNTER (OUTPATIENT)
Dept: HOSPITAL 53 - M LAB REF | Age: 63
End: 2023-03-15
Attending: INTERNAL MEDICINE
Payer: COMMERCIAL

## 2023-03-15 DIAGNOSIS — K31.84: Primary | ICD-10-CM

## 2023-03-28 ENCOUNTER — HOSPITAL ENCOUNTER (OUTPATIENT)
Dept: HOSPITAL 53 - M LABSMTC | Age: 63
End: 2023-03-28
Attending: ANESTHESIOLOGY
Payer: COMMERCIAL

## 2023-03-28 DIAGNOSIS — Z20.822: Primary | ICD-10-CM

## 2023-03-30 ENCOUNTER — HOSPITAL ENCOUNTER (OUTPATIENT)
Dept: HOSPITAL 53 - M OPP | Age: 63
Discharge: HOME | End: 2023-03-30
Attending: INTERNAL MEDICINE
Payer: COMMERCIAL

## 2023-03-30 VITALS — HEIGHT: 75 IN | BODY MASS INDEX: 23.5 KG/M2 | WEIGHT: 189 LBS

## 2023-03-30 VITALS — DIASTOLIC BLOOD PRESSURE: 75 MMHG | SYSTOLIC BLOOD PRESSURE: 118 MMHG

## 2023-03-30 DIAGNOSIS — E10.9: ICD-10-CM

## 2023-03-30 DIAGNOSIS — R10.84: Primary | ICD-10-CM

## 2023-03-30 DIAGNOSIS — Z79.82: ICD-10-CM

## 2023-03-30 DIAGNOSIS — F17.200: ICD-10-CM

## 2023-03-30 DIAGNOSIS — Z79.899: ICD-10-CM

## 2023-03-30 DIAGNOSIS — Z79.02: ICD-10-CM

## 2023-03-30 DIAGNOSIS — Z80.8: ICD-10-CM

## 2023-03-30 DIAGNOSIS — E78.00: ICD-10-CM

## 2023-03-30 DIAGNOSIS — Z79.4: ICD-10-CM

## 2023-03-30 DIAGNOSIS — F41.9: ICD-10-CM

## 2023-03-30 DIAGNOSIS — R10.13: ICD-10-CM

## 2023-03-30 DIAGNOSIS — Z86.19: ICD-10-CM

## 2023-03-30 PROCEDURE — 43235 EGD DIAGNOSTIC BRUSH WASH: CPT

## 2023-04-11 NOTE — DSES
DATE OF ADMISSION:   01/26/2020

DATE OF DISCHARGE:  01/28/2020

 

DISCHARGE DIAGNOSES:

1.  Diabetic ketoacidosis.

2.  Diabetes mellitus type 1.

3.  Leukocytosis secondary to leukemoid reaction.

 

PROCEDURES PERFORMED DURING THIS HOSPITALIZATION:  None.

 

CONSULTANTS ON THE CASE:  None.

 

DISPOSITION:  The patient was discharged home in stable condition.

 

DISCHARGE INSTRUCTIONS:   The patient is instructed to followup with his primary

care provider in the next week.

 

DISCHARGE MEDICATIONS:

- Tylenol with codeine one to two tablets every 4 hours as needed for pain

- baby aspirin daily

- atorvastatin 20 mg daily

- Cymbalta 60 mg daily

- duloxetine 30 mg at bedtime

- Lantus 65 units daily

- NovoLog sliding scale

- Lisinopril 5 mg daily

- meclizine 25 mg three times a day as needed for nausea and vomiting

- Reglan 5 mg by mouth four times a day

- Ambien 10 mg at bedtime

 

RELEVANT IMAGING STUDIES:

CT of the abdomen and pelvis, which showed only hepatosteatosis with no acute

findings.  Please reference full report for details.

 

Chest x-ray, one view, showed no acute cardiopulmonary process.

 

RELEVANT LABORATORIES:

Blood cultures times two were negative.  On admission, the patient had a white

count of 30,000, which trended down to 5.1.  Hemoglobin 11.4, hematocrit 34.3,

platelet count 229.

 

Venous blood gas showed a venous pH of 6.9.

 

Sodium 140, potassium 3.5, chloride 110, bicarbonate 26, creatinine 0.56, glucose

124, calcium 8.4, lactic acid was 1.2.

 

HOSPITAL COURSE:  Mr. Beckett is a 69-year-old type 1 diabetic who had been

exposed to his granddaughter who had a viral illness.  The patient subsequently

developed intractable nausea and vomiting with some diarrhea.  He subsequently

presented to the hospital with a blood glucose in excess of 700.  He was found to

be in acute diabetic ketoacidosis. He was also noted to have an elevated white

blood cell count of 30,000, which prompted them to do a CT of the abdomen and

pelvis, which was unremarkable, as was chest x-ray.  He was empirically treated

with one dose of vancomycin and Zosyn.  Blood cultures obtained during the

hospitalization did not grow any organisms.  He did not receive any further

antibiotics.  His white count trended down to normal on its own.  It was felt

that the elevation in his leukocytosis was secondary to a leukemoid reaction from

his acute diabetic ketoacidosis.  The patient was admitted to the intensive care

unit (ICU) where he was placed on an insulin drip.  His blood sugars, as well as

ketoacidosis subsequently corrected.  He transitioned to his home insulin regimen

and transferred to the floor where he did quite well.  He was able to tolerate a

diet.  He was subsequently discharged home in stable condition.

 

A total of 30 minutes was spent completing all discharge paperwork. yes

## 2023-05-01 ENCOUNTER — HOSPITAL ENCOUNTER (OUTPATIENT)
Dept: HOSPITAL 53 - M WUC | Age: 63
End: 2023-05-01
Attending: PHYSICIAN ASSISTANT
Payer: COMMERCIAL

## 2023-05-01 DIAGNOSIS — M19.011: ICD-10-CM

## 2023-05-01 DIAGNOSIS — M25.552: ICD-10-CM

## 2023-05-01 DIAGNOSIS — M19.012: ICD-10-CM

## 2023-05-01 DIAGNOSIS — M47.812: ICD-10-CM

## 2023-05-01 DIAGNOSIS — M16.0: ICD-10-CM

## 2023-05-01 DIAGNOSIS — M54.2: Primary | ICD-10-CM

## 2023-05-03 ENCOUNTER — HOSPITAL ENCOUNTER (OUTPATIENT)
Dept: HOSPITAL 53 - M WUC | Age: 63
End: 2023-05-03
Attending: PHYSICIAN ASSISTANT
Payer: COMMERCIAL

## 2023-05-03 DIAGNOSIS — E78.2: ICD-10-CM

## 2023-05-03 DIAGNOSIS — E10.65: Primary | ICD-10-CM

## 2023-05-03 LAB
ALBUMIN SERPL BCG-MCNC: 4.3 G/DL (ref 3.2–5.2)
ALP SERPL-CCNC: 74 U/L (ref 46–116)
ALT SERPL W P-5'-P-CCNC: 15 U/L (ref 7–40)
AST SERPL-CCNC: 11 U/L (ref ?–34)
BILIRUB SERPL-MCNC: 0.4 MG/DL (ref 0.3–1.2)
BUN SERPL-MCNC: 15 MG/DL (ref 9–23)
CALCIUM SERPL-MCNC: 9.6 MG/DL (ref 8.3–10.6)
CHLORIDE SERPL-SCNC: 104 MMOL/L (ref 98–107)
CHOLEST SERPL-MCNC: 156 MG/DL (ref ?–200)
CHOLEST/HDLC SERPL: 3.04 {RATIO} (ref ?–5)
CO2 SERPL-SCNC: 28 MMOL/L (ref 20–31)
CREAT SERPL-MCNC: 0.86 MG/DL (ref 0.7–1.3)
EST. AVERAGE GLUCOSE BLD GHB EST-MCNC: 258 MG/DL (ref 60–110)
GFR SERPL CREATININE-BSD FRML MDRD: > 60 ML/MIN/{1.73_M2} (ref 49–?)
GLUCOSE SERPL-MCNC: 257 MG/DL (ref 74–106)
HDLC SERPL-MCNC: 51.2 MG/DL (ref 40–?)
LDLC SERPL CALC-MCNC: 90.4 MG/DL (ref ?–100)
NONHDLC SERPL-MCNC: 104.8 MG/DL
POTASSIUM SERPL-SCNC: 4.6 MMOL/L (ref 3.5–5.1)
PROT SERPL-MCNC: 6.5 G/DL (ref 5.7–8.2)
SODIUM SERPL-SCNC: 135 MMOL/L (ref 136–145)
TRIGL SERPL-MCNC: 72 MG/DL (ref ?–150)

## 2023-06-08 ENCOUNTER — HOSPITAL ENCOUNTER (OUTPATIENT)
Dept: HOSPITAL 53 - M RAD | Age: 63
End: 2023-06-08
Attending: INTERNAL MEDICINE
Payer: COMMERCIAL

## 2023-06-08 DIAGNOSIS — R10.84: Primary | ICD-10-CM

## 2023-09-13 ENCOUNTER — HOSPITAL ENCOUNTER (OUTPATIENT)
Dept: HOSPITAL 53 - M RAD | Age: 63
End: 2023-09-13
Attending: INTERNAL MEDICINE
Payer: COMMERCIAL

## 2023-09-13 DIAGNOSIS — K82.8: Primary | ICD-10-CM

## 2023-09-13 PROCEDURE — 78227 HEPATOBIL SYST IMAGE W/DRUG: CPT

## 2023-10-02 NOTE — IPNPDOC
Date Seen


The patient was seen on 12/21/20.





Progress Note


SUBJECTIVE: 


Plan was for discharge today ;however, patient became very dizzy, room spinning,

lightheadedness and he felt like he was about to "pass out" while showering. 

Orthostatics were not done at that time but patient has had similar symptoms in 

past with diagnosis of vertigo and also orthostatic hypotension. When assessed 

later in the day by myself he "felt off" but not dizzy, lightheaded, no 

neurological concerns on exam. Vestibular therapist not here until 12/22/20 do 

decision was made to keep until evaluated. Abdominal pain persists but not 

worsened, diet advanced. Denies chest pain, incr SOB, fevers, chills. 





OBJECTIVE: 





PHYSICAL EXAMINATION: 


VS: please see below 


CONSTITUTIONAL: Resting in bed, AAO x 3


EYES: PERRLA, EOM intact


HENT, MOUTH: Normocephalic, atraumatic, dry mucous membranes


NECK: SUPPLE, no JVD, no lymphadenopathy, no carotid bruit


CV: Regular rate and rhythm, S1S2 normal, no murmurs/rubs/gallops


RESPIRATORY: Clear to auscultation bilaterally, no rales/rhonchi/wheezes


GI:  abdominal tenderness localized to epigastric area , BS positive in 4 

quadrants, soft, nondistended, no rebound or guarding, no organomegaly


: Deferred


MUSCULOSKELETAL: Normal ROM. No cyanosis, clubbing, swelling, joint deformity, 

extremity edema


INTEGUMENTARY:  Intact, no rashes, no lesions, no erythema


NEUROLOGIC: Cranial Nerves II-XII are intact, no focal deficits, no nystagmus on

exam


PSYCHIATRIC: Mood and affect are normal 





LABORATORY DATA: 


Please see below 





IMAGING: 





CT abd/pelvis without contrast: 


There is a small focal density within a small bowel loop on the left, likely an 

ingested tablet or capsule. No bowel distention or obstruction.  No focal fluid 

collection to suggest abscess or hematoma.  No ascites.  No mass or adenopathy. 

No pneumoperitoneum. Otherwise, essentially negative CT of the abdomen and 

pelvis.





CXR: 


No active disease 





ASSESSMENT: Patient is a 59 y/o M with PMH of IDDM Type I with hx recurrent 

admissions (last 8/2020) for DKA, hx of c. diff (8/2020), HTN, HLD, Depression 

and chronic back pain admitted by medicine service to the ICU for continued 

treatment of moderate diabetic ketoacidosis requiring insulin drip.





PLAN:  





Nausea/vomiting with abdominal pain poss 2/2 to gastroparesis, gastritis, 

esophagitis 


-Appetite slightly improved, pain persists intermittently joshua after eating . 

Tenderness focused mostly in epigastric area


-Discussed with Dr. Gosselin who did endoscopy 12/20/20: D/c patient to follow 

up with GI mid-January 2021. Discharge on all medications he is currently on. 

Have GI follow up on pathology sent during scope 


-CT abd pelvis: No acute issues.


-Endoscopy 12/20/20:  no delayed emptying but say gastritis in proximal stomach 

and irritation in distal esophagitis that appeared to be healing.


-GI appt in several weeks can f/u path from scope


-C/w consistent carb diet, reglan AC, started sucralfate, zofran PRN, incr PPI 

to BID 


-H. pylori testing pending. If not back by discharge, have PCP follow up. Has 

history of C. diff very recently so treating with abx may cause more problems. 





Dizziness, hx of orthostatic hypotension and vertigo 


-No neurological deficits on exam


-Previously on meclizine but has not taken in some time 


-Previously cleared by PT this admission but will be evaluated by vestibular 

rehab therapist in the AM. 


-Orthostatics PRN with any of the above symptoms. 





Uncontrolled DM- resolved DKA 


-Hx of recurrent admissions (last 8/2020) for DKA


--250


-Last HbA1c > 10 in 8/2020. Last lipid panel 2012 showed TG >500, cholesterol 

elevated 


-HbA1c 10.7, lipid panel unremarkable


-Consistent carb diet


-Levemir at low dose in QAM, ISS, FS AC/HS, consistent carb diet


-N/v: reglan TID, zofran PRN


-Daily labs


-Will need to be discharged with much lower dose levemir in AM while he is not 

eating large amounts with meals. 





HTN


-BP uncontrolled. 


-C/wACEi





HLD


-lipid panel unremarkable 


-C/w statin





Depression


-Stable


-C/w home duloxetine 





Chronic back pain


-Stable 


-C/w tylenol PRN 





Hx of C. diff


-Diagnosed in 8/2020


-Treated with PO vancomycin as o/p, followed with Dr. Roper


-No diarrhea in > 1 week, last BM was formed


-Try to avoid abx 


-Probiotic with meals 





Insomnia 


-C/w zolpidem 





GI px 


-PPI incr to BID 





DVT px 


-Heparin SC 





Resolved issues: 


Acute kidney injury likely 2/2 prerenal cause, dehydration


Hypophosphatemia, acute 





DISPOSITION: Unfortunately will not be able to completely take away discomfort 

with eating per surgery. Plan is evaluation by vestibular therapist and if does 

well, discharge home with f/u with PCP and GI in 1/2021.





VS, I&O, 24H, Fishbone


Vital Signs/I&O





Vital Signs








  Date Time  Temp Pulse Resp B/P (MAP) Pulse Ox O2 Delivery O2 Flow Rate FiO2


 


12/21/20 14:00 98.4 89 17 135/77 (96) 100 Room Air  














I&O- Last 24 Hours up to 6 AM 


 


 12/21/20





 05:59


 


Intake Total 2480 ml


 


Output Total 2750 ml


 


Balance -270 ml











Laboratory Data


24H LABS


Laboratory Tests 2


12/20/20 19:45: Bedside Glucose (Misc Panel) 136H


12/21/20 05:58: 


Nucleated Red Blood Cells % (auto) 0.0, Anion Gap 5L, Glomerular Filtration Rate

> 60.0, Calcium Level 9.2, Total Bilirubin 0.7, Aspartate Amino Transf 

(AST/SGOT) 17, Alanine Aminotransferase (ALT/SGPT) 29, Alkaline Phosphatase 64, 

Total Protein 6.6, Albumin 3.9, Albumin/Globulin Ratio 1.4


12/21/20 11:25: Bedside Glucose (Misc Panel) 276H


12/21/20 17:14: Bedside Glucose (Misc Panel) 171H


CBC/BMP


Laboratory Tests


12/21/20 05:58











Current Medications





Current Medications








 Medications


  (Trade)  Dose


 Ordered  Sig/Chen


 Route


 PRN Reason  Start Time


 Stop Time Status Last Admin


Dose Admin


 


 Acetaminophen


  (Tylenol Tab)  650 mg  Q6HP  PRN


 PO


 PAIN / FEVER  12/16/20 18:30


    12/20/20 18:06





 


 Aspirin


  (Ecotrin)  81 mg  DAILY


 PO


   12/17/20 09:00


    12/21/20 08:58





 


 Atorvastatin


 Calcium


  (Lipitor)  20 mg  DAILY


 PO


   12/17/20 09:00


 12/19/20 16:24 DC 12/19/20 09:19





 


 Atorvastatin


 Calcium


  (Lipitor)  20 mg  DAILY


 PO


   12/20/20 09:00


    12/21/20 08:58





 


 Atorvastatin


 Calcium


  (Lipitor)  40 mg  DAILY


 PO


   12/20/20 09:00


 12/19/20 16:26 DC  





 


 Ceftriaxone


 Sodium 1 gm/


 Dextrose  50 ml @ 


 100 mls/hr  Q24H


 IV


   12/16/20 20:00


 12/16/20 18:21 DC  





 


 Dextrose


  (Dextrose 50%)  25 ml  ASDIRECTED  PRN


 IV


 SEE LABEL COMMENTS  12/17/20 11:45


     





 


 Dextrose/Sodium


 Chloride  1,000 ml @ 


 75 mls/hr  [id number]


 IV


   12/17/20 00:30


 12/19/20 16:28 DC 12/19/20 09:23





 


 Duloxetine HCl


  (Cymbalta)  30 mg  QHS


 PO


   12/19/20 21:00


    12/20/20 20:17





 


 Duloxetine HCl


  (Cymbalta)  60 mg  DAILY


 PO


   12/17/20 09:00


    12/21/20 08:58





 


 Fenofibrate


  (Tricor)  145 mg  DAILY


 PO


   12/20/20 09:00


 12/19/20 16:26 DC  





 


 Glucagon


  (Glucagon)  1 mg  ASDIRECTED  PRN


 SC


 SEE LABEL COMMENTS  12/17/20 11:45


     





 


 Glucose


  (Glucose)  16 GM  ASDIRECTED  PRN


 PO


 SEE LABEL COMMENTS  12/17/20 11:45


     





 


 Heparin Sodium


  (Porcine)


  (Heparin)  5,000 units  Q8H


 SC


   12/16/20 22:00


    12/21/20 14:56





 


 Home Med


  (Med Rec


 Complete!)    ASDIRECTED


 XX


   12/16/20 18:00


 12/16/20 17:50 DC  





 


 Influenza Virus


 Vaccine


  (Flublok


 Quad(Egg-Free)18y&Older


 Influenza)  0.5 ml  ASDIRECTED


 IM


   12/17/20 07:30


     





 


 Insulin Detemir


  (Levemir Insulin)  18 units  QAM


 SC


   12/20/20 09:00


    12/21/20 08:59





 


 Insulin Detemir


  (Levemir Insulin)  25 units  QAM


 SC


   12/17/20 09:00


 12/19/20 09:54 DC 12/19/20 09:21





 


 Insulin Detemir


  (Levemir Insulin)  30 units  QAM


 SC


   12/20/20 09:00


 12/20/20 08:25 DC  





 


 Insulin Human


 Lispro


  (HumaLOG INSULIN)  SEE


 PROTOCOL


 TABLE  AC


 SC


   12/17/20 12:00


    12/21/20 17:33





 


 Insulin Human


 Lispro


  (HumaLOG INSULIN)  SEE


 PROTOCOL


 TABLE  QHS


 SC


   12/17/20 21:00


     





 


 Insulin Human


 Regular 100 unit/


 IV Miscellaneous


 Supplies  100 ml @ 0


 mls/hr  Q0M


 IV


   12/16/20 17:50


 12/17/20 11:53 DC 12/17/20 00:12





 


 Insulin Human


 Regular 100 unit/


 IV Miscellaneous


 Supplies  100 ml @ 7


 mls/hr  [id number]


 IV


   12/16/20 17:01


 12/16/20 18:10 DC 12/16/20 17:25





 


 Ketorolac


 Tromethamine


  (ToRADol)  15 mg  Q8H


 IV


   12/17/20 16:00


 12/22/20 15:59  12/20/20 23:59





 


 Lactated Ringer's  1,000 ml @ 


 100 mls/hr  Q10H


 IV


   12/20/20 13:00


 12/20/20 14:00 DC  





 


 Lisinopril


  (Prinivil)  5 mg  DAILY


 PO


   12/19/20 09:00


    12/21/20 08:58





 


 Metoclopramide HCl


  (REGLAN


 INJection)  10 mg  Q6HP  PRN


 IV


 NAUSEA OR VOMITING  12/20/20 13:00


 12/20/20 14:00 DC  





 


 Metoclopramide HCl


  (REGLAN


 INJection)  10 mg  TID


 IV


   12/17/20 16:00


    12/21/20 16:47





 


 Metoclopramide HCl


  (Reglan)  5 mg  AC


 PO


   12/17/20 17:30


 12/17/20 14:53 DC  





 


 Metoclopramide HCl


  (Reglan)  10 mg  AC


 PO


   12/17/20 12:00


 12/17/20 12:02 DC 12/17/20 11:59





 


 Morphine Sulfate


  (Morphine


 Sulfate Inj)  1 mg  Q6H  PRN


 IV


 SEVERE PAIN (PS 8-10)  12/17/20 15:00


     





 


 Non-Formulary


 Medication


  (Insulin Iv Rate


 Change


 Documentation ml/


 Hr)    ASDIRECTED


 XX


   12/16/20 17:15


 12/16/20 18:10 DC  





 


 Non-Formulary


 Medication


  (Insulin Iv Rate


 Change


 Documentation ml/


 Hr)    ASDIRECTED


 XX


   12/16/20 18:00


 12/17/20 11:53 DC 12/17/20 03:54





 


 Ondansetron HCl


  (ZOFRAN


 INJection)  4 mg  Q4HP  PRN


 IV


 NAUSEA OR VOMITING  12/20/20 13:00


 12/20/20 14:00 DC  





 


 Ondansetron HCl


  (ZOFRAN


 INJection)  4 mg  Q6HP  PRN


 IV


 NAUSEA OR VOMITING  12/16/20 18:30


    12/17/20 09:06





 


 Pantoprazole


 Sodium


  (Protonix)  40 mg  DAILY


 IV


   12/17/20 09:00


 12/17/20 08:51 DC  





 


 Pantoprazole


 Sodium


  (Protonix)  40 mg  QAM


 PO


   12/17/20 09:00


    12/21/20 08:58





 


 Sodium Chloride  1,000 ml @ 


 75 mls/hr  [id number]


 IV


   12/19/20 17:00


 12/20/20 06:59 DC 12/19/20 17:27





 


 Sodium Chloride  1,000 ml @ 


 125 mls/hr  Q8H


 IV


   12/16/20 17:50


 12/17/20 00:26 DC 12/16/20 18:18





 


 Sodium Chloride  1,000 ml @ 


 150 mls/hr  Q6H40M


 IV


   12/16/20 17:44


 12/17/20 00:05 DC 12/16/20 18:09





 


 Sucralfate


  (Carafate


 Suspension)  1 gm  ACHS


 PO


   12/18/20 07:30


    12/21/20 12:10





 


 Zolpidem Tartrate


  (Ambien)  10 mg  QHS


 PO


   12/16/20 21:00


    12/19/20 20:53














Allergies


Coded Allergies:  


     pregabalin (Verified  Adverse Reaction, Unknown, LEG SWELLING, 12/16/20)











Erika Gaming MD            Dec 21, 2020 17:46 HPI:     Wilbert Leal is a 78year old male with a PMH of HTN, asthma, Raynaud's. He presents as a consult with:  1. Elevated PSA  2. BPH/LUTS and incidentally noted to have > 1 L in bladder today  - flomax 2021 and not sure if this helps. 3. Kidney stones  - possibly passed one in 2021, no procedures  4. Worsening overflow incontinence    PCP - Nirmal Balloon    He currently feels well. Appetite and energy are good. Reports 2-3 y h/o LUTS which is worsening and most bothered by frequency and nocturia. Also leaking some stool when he has to urinate and during runs. Prior BPH/OAB meds: flomax since ~ 2021 and not sure if this helps    AUA SS is 17/35 with 4 n, u, f; 2 w; 1 s, I, WILLIAM. Mixed feelings towards LUTS. Incontinence: yes, urge (and probably overflow incontinence) and changing underwear 2-3 x per day  Abd exam: large, palpable bladder on exam  Penoscrotal: no abnormalities  TISHA: > 50 g prostate, no nodules or tenderness     UA is negative and PVR is 17 mL     Moderate ED. Currently masturbates without issue. Prior PSAs:  - 4.43 6/28/23  - 3.99 7/5/22  - 3.37 9/27/21  - 3.60 9/24/20  - 3.72 9/23/19  - 3.35 10/12/18  - < 3 y prior     UTI hx: none  Gross hematuria: none  Tobacco hx: none  Fam h/o  malignancy: none     CT AP 1/24/21: 1 mm distal left ureteral calc, BPH with marked bladder distention     Drinks no water, 20 oz baca, 20 milk, 36 soda with clear urine. We discussed both doubling flomax and proscar as options for LUTS and reviewed SEs (including low risk of hypotension with flomax and possible sexual side effects with both medications). He would like to try both. Discussed his bladder is markedly distended at this time and he has probably has been dealing with this since at least 2021 per prior CT scan.  Discussed risks a/w urinary retention - including, but not limited to, UTI, hematuria, bladder stones, complete retention requiring richard catheter placement, worsening and potentially permanent urinary incontinence, kidney failure. Discussed options for urinary retention and after a lot of back and forth he is open to learning CIC today. Obrien and observation were also discussed and the pros and cons to both options. CIC teaching performed and patient was able to perform with ~ 1300 mL out. Recommend he double flomax, start proscar. Start TID post-void CIC. Increase water intake to ~ 40-60 oz per day or enough to keep urine clear for UTI prevention. RTC in ~ 4 weeks for check-up with PVR and intermittently record values. I spent over 60 minutes in consultation and coordination of this patient's care today including review of pertinent labs, imaging, records with > 50% of time spent counseling. HISTORY:  Past Medical History:   Diagnosis Date    Aortic aneurysm (HCC)     Asthma     Bicuspid aortic valve     Hearing loss     Raynaud's syndrome     Unspecified essential hypertension     Wears glasses       History reviewed. No pertinent surgical history. Family History   Problem Relation Age of Onset    Heart Attack Father     Hypertension Father     Cancer Mother         Colon/Liver    Colon Cancer Mother     Breast Cancer Sister     Breast Cancer Sister     Cancer Other         family hx, Colon      Social History:   Social History     Socioeconomic History    Marital status:    Tobacco Use    Smoking status: Never    Smokeless tobacco: Never   Vaping Use    Vaping Use: Never used   Substance and Sexual Activity    Alcohol use: No    Drug use: No   Other Topics Concern    Caffeine Concern Yes     Comment: pop 2-4 daily/ tea    Exercise Yes     Comment: 4-5 x a week         Medications (Active prior to today's visit):  Current Outpatient Medications   Medication Sig Dispense Refill    dilTIAZem HCl ER Beads 120 MG Oral Capsule SR 24 Hr Take 1 capsule (120 mg total) by mouth daily.       tamsulosin 0.4 MG Oral Cap Take 2 capsules (0.8 mg total) by mouth every evening. 180 capsule 5    finasteride 5 MG Oral Tab Take 1 tablet (5 mg total) by mouth daily. 90 tablet 6    fluticasone-salmeterol (WIXELA INHUB) 250-50 MCG/ACT Inhalation Aerosol Powder, Breath Activated Inhale 1 puff into the lungs Q12H. 180 each 0    losartan Potassium 50 MG Oral Tab Take 1 tablet (50 mg total) by mouth daily. dilTIAZem HCl ER Coated Beads 120 MG Oral Capsule SR 24 Hr Take 1 capsule (120 mg total) by mouth daily. Albuterol Sulfate  (90 Base) MCG/ACT Inhalation Aero Soln Inhale 2 puffs into the lungs every 6 (six) hours as needed for Wheezing or Shortness of Breath. 1 Inhaler 2       Allergies:  No Known Allergies      ROS:     A comprehensive 10 point review of systems was completed. Pertinent positives and negatives noted in the the HPI. PHYSICAL EXAM:     GENERAL APPEARANCE: well, developed, well nourished, in no acute distress  NEUROLOGIC: nonfocal, alert and oriented  HEAD: normocephalic, atraumatic  EYES: sclera non-icteric  EARS: hearing intact  ORAL CAVITY: mucosa moist  NECK/THYROID: no obvious goiter or masses  LUNGS: nonlabored breathing  ABDOMEN: soft, no obvious masses or tenderness  SKIN: no obvious rashes    : as noted above    ASSESSMENT/PLAN:   Diagnoses and all orders for this visit:    Elevated PSA    Urine finding  -     URINALYSIS, AUTO, W/O SCOPE    Nocturia    BPH with obstruction/lower urinary tract symptoms  -     tamsulosin 0.4 MG Oral Cap; Take 2 capsules (0.8 mg total) by mouth every evening.  -     finasteride 5 MG Oral Tab; Take 1 tablet (5 mg total) by mouth daily. Recurrent kidney stones      - as noted above. Thanks again for this consult.     Desmond Penny MD, [address]  Urologist  [place] [address]  Office: [phone]